# Patient Record
Sex: FEMALE | Race: WHITE | Employment: OTHER | URBAN - METROPOLITAN AREA
[De-identification: names, ages, dates, MRNs, and addresses within clinical notes are randomized per-mention and may not be internally consistent; named-entity substitution may affect disease eponyms.]

---

## 2019-05-20 ENCOUNTER — HOSPITAL ENCOUNTER (EMERGENCY)
Age: 79
Discharge: HOME | End: 2019-05-20
Attending: EMERGENCY MEDICINE
Payer: MEDICARE

## 2019-05-20 ENCOUNTER — HOSPITAL ENCOUNTER (EMERGENCY)
Age: 79
Discharge: STILL ADMITTED OR ADMINISTRATIVE | End: 2019-05-20
Attending: EMERGENCY MEDICINE

## 2019-05-20 ENCOUNTER — APPOINTMENT (OUTPATIENT)
Dept: GENERAL RADIOLOGY | Age: 79
End: 2019-05-20
Attending: EMERGENCY MEDICINE
Payer: MEDICARE

## 2019-05-20 VITALS
HEART RATE: 77 BPM | DIASTOLIC BLOOD PRESSURE: 72 MMHG | RESPIRATION RATE: 22 BRPM | SYSTOLIC BLOOD PRESSURE: 130 MMHG | OXYGEN SATURATION: 96 % | TEMPERATURE: 98.2 F

## 2019-05-20 VITALS
HEART RATE: 70 BPM | SYSTOLIC BLOOD PRESSURE: 130 MMHG | DIASTOLIC BLOOD PRESSURE: 74 MMHG | TEMPERATURE: 98.5 F | RESPIRATION RATE: 22 BRPM | OXYGEN SATURATION: 97 %

## 2019-05-20 DIAGNOSIS — N30.90 CYSTITIS: Primary | ICD-10-CM

## 2019-05-20 LAB
ALBUMIN SERPL-MCNC: 3.2 G/DL (ref 3.2–4.6)
ALBUMIN/GLOB SERPL: 1.1 {RATIO} (ref 1.2–3.5)
ALP SERPL-CCNC: 69 U/L (ref 50–130)
ALT SERPL-CCNC: 21 U/L (ref 12–65)
ANION GAP SERPL CALC-SCNC: 8 MMOL/L (ref 7–16)
APPEARANCE UR: ABNORMAL
AST SERPL-CCNC: 26 U/L (ref 15–37)
BACTERIA URNS QL MICRO: ABNORMAL /HPF
BASOPHILS # BLD: 0 K/UL (ref 0–0.2)
BASOPHILS NFR BLD: 0 % (ref 0–2)
BILIRUB SERPL-MCNC: 0.8 MG/DL (ref 0.2–1.1)
BILIRUB UR QL: ABNORMAL
BUN SERPL-MCNC: 22 MG/DL (ref 8–23)
CALCIUM SERPL-MCNC: 8.8 MG/DL (ref 8.3–10.4)
CASTS URNS QL MICRO: ABNORMAL /LPF
CHLORIDE SERPL-SCNC: 111 MMOL/L (ref 98–107)
CO2 SERPL-SCNC: 28 MMOL/L (ref 21–32)
COLOR UR: ABNORMAL
CREAT SERPL-MCNC: 0.72 MG/DL (ref 0.6–1)
DIFFERENTIAL METHOD BLD: ABNORMAL
EOSINOPHIL # BLD: 0 K/UL (ref 0–0.8)
EOSINOPHIL NFR BLD: 1 % (ref 0.5–7.8)
EPI CELLS #/AREA URNS HPF: ABNORMAL /HPF
ERYTHROCYTE [DISTWIDTH] IN BLOOD BY AUTOMATED COUNT: 12.2 % (ref 11.9–14.6)
GLOBULIN SER CALC-MCNC: 2.9 G/DL (ref 2.3–3.5)
GLUCOSE SERPL-MCNC: 79 MG/DL (ref 65–100)
GLUCOSE UR STRIP.AUTO-MCNC: NEGATIVE MG/DL
HCT VFR BLD AUTO: 39 % (ref 35.8–46.3)
HGB BLD-MCNC: 12.5 G/DL (ref 11.7–15.4)
HGB UR QL STRIP: NEGATIVE
IMM GRANULOCYTES # BLD AUTO: 0.1 K/UL (ref 0–0.5)
IMM GRANULOCYTES NFR BLD AUTO: 1 % (ref 0–5)
KETONES UR QL STRIP.AUTO: ABNORMAL MG/DL
LEUKOCYTE ESTERASE UR QL STRIP.AUTO: ABNORMAL
LYMPHOCYTES # BLD: 1 K/UL (ref 0.5–4.6)
LYMPHOCYTES NFR BLD: 14 % (ref 13–44)
MAGNESIUM SERPL-MCNC: 2.1 MG/DL (ref 1.8–2.4)
MCH RBC QN AUTO: 30.7 PG (ref 26.1–32.9)
MCHC RBC AUTO-ENTMCNC: 32.1 G/DL (ref 31.4–35)
MCV RBC AUTO: 95.8 FL (ref 79.6–97.8)
MONOCYTES # BLD: 0.7 K/UL (ref 0.1–1.3)
MONOCYTES NFR BLD: 11 % (ref 4–12)
MUCOUS THREADS URNS QL MICRO: ABNORMAL /LPF
NEUTS SEG # BLD: 4.9 K/UL (ref 1.7–8.2)
NEUTS SEG NFR BLD: 73 % (ref 43–78)
NITRITE UR QL STRIP.AUTO: POSITIVE
NRBC # BLD: 0 K/UL (ref 0–0.2)
PH UR STRIP: 5.5 [PH] (ref 5–9)
PLATELET # BLD AUTO: 139 K/UL (ref 150–450)
PMV BLD AUTO: 10.2 FL (ref 9.4–12.3)
POTASSIUM SERPL-SCNC: 3.9 MMOL/L (ref 3.5–5.1)
PROT SERPL-MCNC: 6.1 G/DL (ref 6.3–8.2)
PROT UR STRIP-MCNC: 30 MG/DL
RBC # BLD AUTO: 4.07 M/UL (ref 4.05–5.2)
RBC #/AREA URNS HPF: ABNORMAL /HPF
SODIUM SERPL-SCNC: 147 MMOL/L (ref 136–145)
SP GR UR REFRACTOMETRY: 1.06 (ref 1–1.02)
UROBILINOGEN UR QL STRIP.AUTO: 1 EU/DL (ref 0.2–1)
WBC # BLD AUTO: 6.7 K/UL (ref 4.3–11.1)
WBC URNS QL MICRO: ABNORMAL /HPF

## 2019-05-20 PROCEDURE — 99285 EMERGENCY DEPT VISIT HI MDM: CPT | Performed by: EMERGENCY MEDICINE

## 2019-05-20 PROCEDURE — 71045 X-RAY EXAM CHEST 1 VIEW: CPT

## 2019-05-20 PROCEDURE — 51702 INSERT TEMP BLADDER CATH: CPT | Performed by: EMERGENCY MEDICINE

## 2019-05-20 PROCEDURE — 77030011943: Performed by: EMERGENCY MEDICINE

## 2019-05-20 PROCEDURE — 75810000275 HC EMERGENCY DEPT VISIT NO LEVEL OF CARE: Performed by: EMERGENCY MEDICINE

## 2019-05-20 PROCEDURE — 87086 URINE CULTURE/COLONY COUNT: CPT

## 2019-05-20 PROCEDURE — 83735 ASSAY OF MAGNESIUM: CPT

## 2019-05-20 PROCEDURE — 81001 URINALYSIS AUTO W/SCOPE: CPT

## 2019-05-20 PROCEDURE — 80053 COMPREHEN METABOLIC PANEL: CPT

## 2019-05-20 PROCEDURE — 96365 THER/PROPH/DIAG IV INF INIT: CPT | Performed by: EMERGENCY MEDICINE

## 2019-05-20 PROCEDURE — 74011000258 HC RX REV CODE- 258: Performed by: EMERGENCY MEDICINE

## 2019-05-20 PROCEDURE — 85025 COMPLETE CBC W/AUTO DIFF WBC: CPT

## 2019-05-20 PROCEDURE — 74011250636 HC RX REV CODE- 250/636: Performed by: EMERGENCY MEDICINE

## 2019-05-20 PROCEDURE — 96361 HYDRATE IV INFUSION ADD-ON: CPT | Performed by: EMERGENCY MEDICINE

## 2019-05-20 RX ORDER — CEPHALEXIN 500 MG/1
500 CAPSULE ORAL 3 TIMES DAILY
Qty: 30 CAP | Refills: 0 | Status: SHIPPED | OUTPATIENT
Start: 2019-05-20 | End: 2019-05-30

## 2019-05-20 RX ORDER — MEMANTINE HYDROCHLORIDE 10 MG/1
TABLET ORAL DAILY
COMMUNITY

## 2019-05-20 RX ORDER — LISINOPRIL 20 MG/1
TABLET ORAL DAILY
COMMUNITY

## 2019-05-20 RX ORDER — MIRTAZAPINE 30 MG/1
30 TABLET, FILM COATED ORAL
COMMUNITY

## 2019-05-20 RX ORDER — QUETIAPINE FUMARATE 50 MG/1
50 TABLET, FILM COATED ORAL DAILY
COMMUNITY

## 2019-05-20 RX ORDER — DONEPEZIL HYDROCHLORIDE 10 MG/1
10 TABLET, FILM COATED ORAL
COMMUNITY

## 2019-05-20 RX ORDER — DIVALPROEX SODIUM 125 MG/1
125 CAPSULE, COATED PELLETS ORAL 2 TIMES DAILY
COMMUNITY

## 2019-05-20 RX ORDER — LANOLIN ALCOHOL/MO/W.PET/CERES
1000 CREAM (GRAM) TOPICAL DAILY
COMMUNITY
End: 2021-12-22

## 2019-05-20 RX ORDER — DONEPEZIL HYDROCHLORIDE 10 MG/1
10 TABLET, FILM COATED ORAL
COMMUNITY
End: 2021-12-22

## 2019-05-20 RX ORDER — LISINOPRIL 20 MG/1
TABLET ORAL DAILY
Status: ON HOLD | COMMUNITY
End: 2022-02-26 | Stop reason: SDUPTHER

## 2019-05-20 RX ORDER — MEMANTINE HYDROCHLORIDE 10 MG/1
10 TABLET ORAL 2 TIMES DAILY
COMMUNITY
End: 2021-12-22

## 2019-05-20 RX ORDER — QUETIAPINE FUMARATE 50 MG/1
50 TABLET, FILM COATED ORAL DAILY
COMMUNITY
End: 2022-02-26

## 2019-05-20 RX ORDER — LANOLIN ALCOHOL/MO/W.PET/CERES
1000 CREAM (GRAM) TOPICAL DAILY
COMMUNITY

## 2019-05-20 RX ORDER — MIRTAZAPINE 30 MG/1
TABLET, FILM COATED ORAL
COMMUNITY
End: 2021-12-22

## 2019-05-20 RX ORDER — DIVALPROEX SODIUM 125 MG/1
125 CAPSULE, COATED PELLETS ORAL 3 TIMES DAILY
COMMUNITY

## 2019-05-20 RX ADMIN — SODIUM CHLORIDE 1000 ML: 900 INJECTION, SOLUTION INTRAVENOUS at 14:09

## 2019-05-20 RX ADMIN — SODIUM CHLORIDE 1000 ML: 900 INJECTION, SOLUTION INTRAVENOUS at 16:09

## 2019-05-20 RX ADMIN — CEFTRIAXONE 1 G: 1 INJECTION, POWDER, FOR SOLUTION INTRAMUSCULAR; INTRAVENOUS at 15:51

## 2019-05-20 NOTE — DISCHARGE INSTRUCTIONS
Patient's urinalysis is consistent with infection. She was given antibiotics here in the emergency department. The remainder of her workup was unremarkable. Complete 10 days of Keflex as an outpatient.

## 2019-05-20 NOTE — ED PROVIDER NOTES
80-year-old female presenting for decreased mental status. Story was given by EMS via nursing facility. They reported that she was less interactive and somnolent this morning. Seen for same at another hospital yesterday. Patient is alert and interactive now. Denies any complaints at this time. She does have advanced dementia and is alert to self only. The history is provided by the EMS personnel and the nursing home. Lethargy This is a recurrent problem. The current episode started 3 to 5 hours ago. The problem has been resolved. Pertinent negatives include no chest pain, no abdominal pain, no headaches and no shortness of breath. Nothing aggravates the symptoms. Nothing relieves the symptoms. Past Medical History:  
Diagnosis Date  Hypertension  Psychiatric disorder History reviewed. No pertinent surgical history. History reviewed. No pertinent family history. Social History Socioeconomic History  Marital status: Not on file Spouse name: Not on file  Number of children: Not on file  Years of education: Not on file  Highest education level: Not on file Occupational History  Not on file Social Needs  Financial resource strain: Not on file  Food insecurity:  
  Worry: Not on file Inability: Not on file  Transportation needs:  
  Medical: Not on file Non-medical: Not on file Tobacco Use  Smoking status: Not on file Substance and Sexual Activity  Alcohol use: Not on file  Drug use: Not on file  Sexual activity: Not on file Lifestyle  Physical activity:  
  Days per week: Not on file Minutes per session: Not on file  Stress: Not on file Relationships  Social connections:  
  Talks on phone: Not on file Gets together: Not on file Attends Synagogue service: Not on file Active member of club or organization: Not on file Attends meetings of clubs or organizations: Not on file Relationship status: Not on file  Intimate partner violence:  
  Fear of current or ex partner: Not on file Emotionally abused: Not on file Physically abused: Not on file Forced sexual activity: Not on file Other Topics Concern  Not on file Social History Narrative  Not on file ALLERGIES: Patient has no known allergies. Review of Systems Constitutional: Positive for fatigue. Respiratory: Negative for shortness of breath. Cardiovascular: Negative for chest pain. Gastrointestinal: Negative for abdominal pain. Neurological: Negative for headaches. All other systems reviewed and are negative. Vitals:  
 05/20/19 1323 BP: 119/72 Pulse: 70 Resp: 22 Temp: 98.5 °F (36.9 °C) SpO2: 97% Physical Exam  
Constitutional: She appears well-developed and well-nourished. HENT:  
Head: Normocephalic and atraumatic. Eyes: Pupils are equal, round, and reactive to light. Conjunctivae are normal.  
Neck: Neck supple. Cardiovascular: Normal rate and regular rhythm. Pulmonary/Chest: Effort normal and breath sounds normal.  
Abdominal: Soft. Bowel sounds are normal.  
Musculoskeletal: Normal range of motion. Neurological: She is alert. Alert to self Skin: Skin is warm and dry. Abrasions on both knees and the left elbow Nursing note and vitals reviewed. MDM Number of Diagnoses or Management Options Cystitis:  
Diagnosis management comments: 70-year-old female presenting for decreased level of consciousness which seems to have resolved. Apparently the patient had a workup yesterday for the same. I reviewed care everywhere and could not find this. My suspicion is the patient has medications that make her too drowsy and are often still in her system in the mornings but we will check basic labs, urinalysis, chest x-ray. I reviewed her vital signs which are normal.  Her exam is reassuring. Amount and/or Complexity of Data Reviewed Clinical lab tests: ordered and reviewed Tests in the radiology section of CPT®: ordered and reviewed Tests in the medicine section of CPT®: ordered and reviewed Review and summarize past medical records: yes (Reviewed the patient's ER visit yesterday. It appears she was sent to the emergency department for abdominal pain and lethargy. She had normal labs and normal abdominal CT. No episodes of lethargy while she was in the ER that day as well just as today) Risk of Complications, Morbidity, and/or Mortality Presenting problems: moderate Diagnostic procedures: moderate Management options: moderate General comments: I personally reviewed the patient's vital signs, laboratory tests, and/or radiological findings. I discussed these findings with the patient and their significance. I answered all questions and gave the patient clear return precautions. The patient was discharged from the emergency department in stable condition Patient Progress Patient progress: improved ED Course as of May 20 1639 Mon May 20, 2019  
1352 Chest x-ray shows a widened mediastinum without rotation, no consolidation [JS] 1512 Patient's urinalysis shows 4+ bacteria is nitrite and leukocyte esterase positive. Also hemoconcentrated consistent with dehydration. Treating with ceftriaxone. [JS] 1514 I reviewed the patient's urinalysis from yesterday and today's findings are very different from the ears. This is difficult to explain. Regardless, she is getting antibiotics-year-old be discharged with the same. [JS] ED Course User Index [JS] Matt Bosworth, MD  
 
 
Procedures

## 2019-05-20 NOTE — ED TRIAGE NOTES
Patient presents via EMS from the [facility] at [facility] for \"lethargy\" according to staff at her home. Patient has a hx of alzheimers and is only oriented to self at baseline per EMS.  Patient was evaluated at [facility] yesterday for the same complaint per EMS

## 2019-05-20 NOTE — ED TRIAGE NOTES
Patient presents from the [facility] via EMS for staff reported \"lethargy and low grade fever over the weekend. \" Per EMS patient was evaluated for the same condition yestreday at [facility]. Patient has a hx of dementia and is confused at baseline.

## 2019-05-23 LAB
BACTERIA SPEC CULT: NORMAL
SERVICE CMNT-IMP: NORMAL

## 2020-12-15 ENCOUNTER — HOSPITAL ENCOUNTER (OUTPATIENT)
Dept: LAB | Age: 80
Discharge: HOME | End: 2020-12-15

## 2020-12-15 LAB
ANION GAP SERPL CALC-SCNC: 6 MMOL/L (ref 7–16)
BUN SERPL-MCNC: 18 MG/DL (ref 8–23)
CALCIUM SERPL-MCNC: 9.1 MG/DL (ref 8.3–10.4)
CHLORIDE SERPL-SCNC: 110 MMOL/L (ref 98–107)
CO2 SERPL-SCNC: 25 MMOL/L (ref 21–32)
CREAT SERPL-MCNC: 0.66 MG/DL (ref 0.6–1)
ERYTHROCYTE [DISTWIDTH] IN BLOOD BY AUTOMATED COUNT: 13.2 % (ref 11.9–14.6)
GLUCOSE SERPL-MCNC: 70 MG/DL (ref 65–100)
HCT VFR BLD AUTO: 35.6 % (ref 35.8–46.3)
HGB BLD-MCNC: 11 G/DL (ref 11.7–15.4)
MCH RBC QN AUTO: 31 PG (ref 26.1–32.9)
MCHC RBC AUTO-ENTMCNC: 30.9 G/DL (ref 31.4–35)
MCV RBC AUTO: 100.3 FL (ref 79.6–97.8)
NRBC # BLD: 0 K/UL (ref 0–0.2)
PLATELET # BLD AUTO: 237 K/UL (ref 150–450)
PMV BLD AUTO: 9.9 FL (ref 9.4–12.3)
POTASSIUM SERPL-SCNC: 4.4 MMOL/L (ref 3.5–5.1)
RBC # BLD AUTO: 3.55 M/UL (ref 4.05–5.2)
SODIUM SERPL-SCNC: 141 MMOL/L (ref 136–145)
WBC # BLD AUTO: 5.5 K/UL (ref 4.3–11.1)

## 2020-12-15 PROCEDURE — 80048 BASIC METABOLIC PNL TOTAL CA: CPT

## 2020-12-15 PROCEDURE — 85027 COMPLETE CBC AUTOMATED: CPT

## 2021-12-15 ENCOUNTER — APPOINTMENT (OUTPATIENT)
Dept: GENERAL RADIOLOGY | Age: 81
DRG: 481 | End: 2021-12-15
Attending: EMERGENCY MEDICINE
Payer: MEDICARE

## 2021-12-15 ENCOUNTER — HOSPITAL ENCOUNTER (INPATIENT)
Age: 81
LOS: 7 days | Discharge: NURSING, REHAB OR LONG-TERM CARE | DRG: 481 | End: 2021-12-22
Attending: EMERGENCY MEDICINE | Admitting: FAMILY MEDICINE
Payer: MEDICARE

## 2021-12-15 ENCOUNTER — APPOINTMENT (OUTPATIENT)
Dept: CT IMAGING | Age: 81
DRG: 481 | End: 2021-12-15
Attending: EMERGENCY MEDICINE
Payer: MEDICARE

## 2021-12-15 DIAGNOSIS — S72.001A CLOSED FRACTURE OF RIGHT HIP, INITIAL ENCOUNTER (HCC): Primary | ICD-10-CM

## 2021-12-15 LAB
ALBUMIN SERPL-MCNC: 3.8 G/DL (ref 3.2–4.6)
ALBUMIN/GLOB SERPL: 1.2 {RATIO} (ref 1.2–3.5)
ALP SERPL-CCNC: 87 U/L (ref 50–136)
ALT SERPL-CCNC: 19 U/L (ref 12–65)
ANION GAP SERPL CALC-SCNC: 12 MMOL/L (ref 7–16)
AST SERPL-CCNC: 16 U/L (ref 15–37)
BASOPHILS # BLD: 0 K/UL (ref 0–0.2)
BASOPHILS NFR BLD: 0 % (ref 0–2)
BILIRUB DIRECT SERPL-MCNC: 0.3 MG/DL
BILIRUB SERPL-MCNC: 1 MG/DL (ref 0.2–1.1)
BUN SERPL-MCNC: 25 MG/DL (ref 8–23)
CALCIUM SERPL-MCNC: 9.6 MG/DL (ref 8.3–10.4)
CHLORIDE SERPL-SCNC: 107 MMOL/L (ref 98–107)
CO2 SERPL-SCNC: 26 MMOL/L (ref 21–32)
CREAT SERPL-MCNC: 0.7 MG/DL (ref 0.6–1)
DIFFERENTIAL METHOD BLD: ABNORMAL
EOSINOPHIL # BLD: 0.1 K/UL (ref 0–0.8)
EOSINOPHIL NFR BLD: 1 % (ref 0.5–7.8)
ERYTHROCYTE [DISTWIDTH] IN BLOOD BY AUTOMATED COUNT: 12.8 % (ref 11.9–14.6)
GLOBULIN SER CALC-MCNC: 3.1 G/DL (ref 2.3–3.5)
GLUCOSE SERPL-MCNC: 95 MG/DL (ref 65–100)
HCT VFR BLD AUTO: 40.5 % (ref 35.8–46.3)
HGB BLD-MCNC: 13.3 G/DL (ref 11.7–15.4)
IMM GRANULOCYTES # BLD AUTO: 0.2 K/UL (ref 0–0.5)
IMM GRANULOCYTES NFR BLD AUTO: 1 % (ref 0–5)
LYMPHOCYTES # BLD: 0.6 K/UL (ref 0.5–4.6)
LYMPHOCYTES NFR BLD: 5 % (ref 13–44)
MAGNESIUM SERPL-MCNC: 2.1 MG/DL (ref 1.8–2.4)
MCH RBC QN AUTO: 30.5 PG (ref 26.1–32.9)
MCHC RBC AUTO-ENTMCNC: 32.8 G/DL (ref 31.4–35)
MCV RBC AUTO: 92.9 FL (ref 79.6–97.8)
MONOCYTES # BLD: 0.6 K/UL (ref 0.1–1.3)
MONOCYTES NFR BLD: 6 % (ref 4–12)
NEUTS SEG # BLD: 9.1 K/UL (ref 1.7–8.2)
NEUTS SEG NFR BLD: 86 % (ref 43–78)
NRBC # BLD: 0 K/UL (ref 0–0.2)
PLATELET # BLD AUTO: 193 K/UL (ref 150–450)
PMV BLD AUTO: 10.2 FL (ref 9.4–12.3)
POTASSIUM SERPL-SCNC: 3 MMOL/L (ref 3.5–5.1)
PROT SERPL-MCNC: 6.9 G/DL (ref 6.3–8.2)
RBC # BLD AUTO: 4.36 M/UL (ref 4.05–5.2)
SODIUM SERPL-SCNC: 145 MMOL/L (ref 136–145)
WBC # BLD AUTO: 10.6 K/UL (ref 4.3–11.1)

## 2021-12-15 PROCEDURE — 74011250636 HC RX REV CODE- 250/636: Performed by: FAMILY MEDICINE

## 2021-12-15 PROCEDURE — 80048 BASIC METABOLIC PNL TOTAL CA: CPT

## 2021-12-15 PROCEDURE — 96374 THER/PROPH/DIAG INJ IV PUSH: CPT

## 2021-12-15 PROCEDURE — 85025 COMPLETE CBC W/AUTO DIFF WBC: CPT

## 2021-12-15 PROCEDURE — 99284 EMERGENCY DEPT VISIT MOD MDM: CPT

## 2021-12-15 PROCEDURE — 70450 CT HEAD/BRAIN W/O DYE: CPT

## 2021-12-15 PROCEDURE — 73552 X-RAY EXAM OF FEMUR 2/>: CPT

## 2021-12-15 PROCEDURE — 73502 X-RAY EXAM HIP UNI 2-3 VIEWS: CPT

## 2021-12-15 PROCEDURE — 71045 X-RAY EXAM CHEST 1 VIEW: CPT

## 2021-12-15 PROCEDURE — 65270000029 HC RM PRIVATE

## 2021-12-15 PROCEDURE — 80076 HEPATIC FUNCTION PANEL: CPT

## 2021-12-15 PROCEDURE — 72125 CT NECK SPINE W/O DYE: CPT

## 2021-12-15 PROCEDURE — 83735 ASSAY OF MAGNESIUM: CPT

## 2021-12-15 RX ORDER — ENOXAPARIN SODIUM 100 MG/ML
40 INJECTION SUBCUTANEOUS EVERY 24 HOURS
Status: DISCONTINUED | OUTPATIENT
Start: 2021-12-16 | End: 2021-12-18

## 2021-12-15 RX ORDER — MORPHINE SULFATE 2 MG/ML
1 INJECTION, SOLUTION INTRAMUSCULAR; INTRAVENOUS
Status: DISCONTINUED | OUTPATIENT
Start: 2021-12-15 | End: 2021-12-15

## 2021-12-15 RX ORDER — DONEPEZIL HYDROCHLORIDE 5 MG/1
10 TABLET, FILM COATED ORAL
Status: DISCONTINUED | OUTPATIENT
Start: 2021-12-15 | End: 2021-12-19

## 2021-12-15 RX ORDER — POTASSIUM CHLORIDE 14.9 MG/ML
20 INJECTION INTRAVENOUS
Status: DISPENSED | OUTPATIENT
Start: 2021-12-15 | End: 2021-12-15

## 2021-12-15 RX ORDER — MIRTAZAPINE 15 MG/1
30 TABLET, FILM COATED ORAL
Status: CANCELLED | OUTPATIENT
Start: 2021-12-15

## 2021-12-15 RX ORDER — MEMANTINE HYDROCHLORIDE 5 MG/1
10 TABLET ORAL 2 TIMES DAILY
Status: DISCONTINUED | OUTPATIENT
Start: 2021-12-16 | End: 2021-12-19

## 2021-12-15 RX ORDER — MORPHINE SULFATE 2 MG/ML
0.5 INJECTION, SOLUTION INTRAMUSCULAR; INTRAVENOUS
Status: DISCONTINUED | OUTPATIENT
Start: 2021-12-15 | End: 2021-12-17

## 2021-12-15 RX ORDER — MORPHINE SULFATE 2 MG/ML
0.5 INJECTION, SOLUTION INTRAMUSCULAR; INTRAVENOUS
Status: DISCONTINUED | OUTPATIENT
Start: 2021-12-15 | End: 2021-12-15

## 2021-12-15 RX ORDER — DIVALPROEX SODIUM 125 MG/1
125 CAPSULE, COATED PELLETS ORAL 2 TIMES DAILY
Status: DISCONTINUED | OUTPATIENT
Start: 2021-12-16 | End: 2021-12-22 | Stop reason: HOSPADM

## 2021-12-15 RX ORDER — LISINOPRIL 20 MG/1
20 TABLET ORAL DAILY
Status: DISCONTINUED | OUTPATIENT
Start: 2021-12-16 | End: 2021-12-16

## 2021-12-15 RX ORDER — LANOLIN ALCOHOL/MO/W.PET/CERES
1000 CREAM (GRAM) TOPICAL DAILY
Status: DISCONTINUED | OUTPATIENT
Start: 2021-12-16 | End: 2021-12-19

## 2021-12-15 RX ORDER — QUETIAPINE FUMARATE 25 MG/1
50 TABLET, FILM COATED ORAL DAILY
Status: CANCELLED | OUTPATIENT
Start: 2021-12-16

## 2021-12-15 RX ADMIN — POTASSIUM CHLORIDE 20 MEQ: 14.9 INJECTION, SOLUTION INTRAVENOUS at 19:43

## 2021-12-15 NOTE — ED TRIAGE NOTES
Patient arrives via [facility] from [facility], fall hitting head no LOC denies neck and back pain about 45 mins ago. Pain to right leg when manipulated.

## 2021-12-15 NOTE — ED PROVIDER NOTES
80-year-old white female history of dementia and residing in memory unit of a skilled nursing facility sent to hospital due to a fall with suspected hip injury. Per EMS patient fell backward and struck her head but had no reported loss of consciousness. Staff at facility noted pain with movement of her right leg. Patient has fairly severe dementia and cannot contribute history of present illness. The history is provided by the EMS personnel. Fall         Past Medical History:   Diagnosis Date    Hypertension     Psychiatric disorder        No past surgical history on file. No family history on file. Social History     Socioeconomic History    Marital status: Not on file     Spouse name: Not on file    Number of children: Not on file    Years of education: Not on file    Highest education level: Not on file   Occupational History    Not on file   Tobacco Use    Smoking status: Not on file    Smokeless tobacco: Not on file   Substance and Sexual Activity    Alcohol use: Not Currently    Drug use: Not on file    Sexual activity: Not on file   Other Topics Concern    Not on file   Social History Narrative    Not on file     Social Determinants of Health     Financial Resource Strain:     Difficulty of Paying Living Expenses: Not on file   Food Insecurity:     Worried About Running Out of Food in the Last Year: Not on file    Loly of Food in the Last Year: Not on file   Transportation Needs:     Lack of Transportation (Medical): Not on file    Lack of Transportation (Non-Medical):  Not on file   Physical Activity:     Days of Exercise per Week: Not on file    Minutes of Exercise per Session: Not on file   Stress:     Feeling of Stress : Not on file   Social Connections:     Frequency of Communication with Friends and Family: Not on file    Frequency of Social Gatherings with Friends and Family: Not on file    Attends [employer] Services: Not on file   [employer] of Clubs or Organizations: Not on file    Attends Club or Organization Meetings: Not on file    Marital Status: Not on file   Intimate Partner Violence:     Fear of Current or Ex-Partner: Not on file    Emotionally Abused: Not on file    Physically Abused: Not on file    Sexually Abused: Not on file   Housing Stability:     Unable to Pay for Housing in the Last Year: Not on file    Number of [place] in the Last Year: Not on file    Unstable Housing in the Last Year: Not on file         ALLERGIES: Patient has no known allergies. Review of Systems   Unable to perform ROS: Dementia       Vitals:    12/15/21 1558   BP: (!) 158/116   Pulse: 85   Resp: 22   Temp: 98.4 °F (36.9 °C)   SpO2: 95%            Physical Exam  Vitals and nursing note reviewed. Constitutional:       General: She is not in acute distress. Appearance: Normal appearance. She is not ill-appearing or toxic-appearing. HENT:      Head: Normocephalic and atraumatic. Nose: Nose normal.      Mouth/Throat:      Mouth: Mucous membranes are moist.   Eyes:      Conjunctiva/sclera: Conjunctivae normal.      Pupils: Pupils are equal, round, and reactive to light. Cardiovascular:      Rate and Rhythm: Normal rate and regular rhythm. Pulmonary:      Effort: Pulmonary effort is normal.      Breath sounds: Normal breath sounds. Abdominal:      General: There is no distension. Palpations: Abdomen is soft. Tenderness: There is no abdominal tenderness. Musculoskeletal:      Cervical back: Normal range of motion and neck supple. No tenderness. Comments: Pain with movement to right hip. Skin:     General: Skin is warm and dry. Neurological:      Mental Status: She is alert. Mental status is at baseline. Psychiatric:         Mood and Affect: Mood normal.         Behavior: Behavior normal.          MDM  Number of Diagnoses or Management Options  Diagnosis management comments: X-ray confirms right hip fracture.   CT head and C-spine no acute abnormality.        Amount and/or Complexity of Data Reviewed  Clinical lab tests: ordered and reviewed  Tests in the radiology section of CPT®: ordered and reviewed  Discuss the patient with other providers: yes  Independent visualization of images, tracings, or specimens: yes    Risk of Complications, Morbidity, and/or Mortality  Presenting problems: moderate  Diagnostic procedures: moderate  Management options: moderate           Procedures

## 2021-12-16 ENCOUNTER — ANESTHESIA EVENT (OUTPATIENT)
Dept: SURGERY | Age: 81
DRG: 481 | End: 2021-12-16
Payer: MEDICARE

## 2021-12-16 LAB
ANION GAP SERPL CALC-SCNC: 10 MMOL/L (ref 7–16)
APPEARANCE UR: ABNORMAL
APTT PPP: 33.6 SEC (ref 24.1–35.1)
BACTERIA SPEC CULT: NORMAL
BACTERIA URNS QL MICRO: ABNORMAL /HPF
BILIRUB UR QL: ABNORMAL
BUN SERPL-MCNC: 21 MG/DL (ref 8–23)
CALCIUM SERPL-MCNC: 9.1 MG/DL (ref 8.3–10.4)
CASTS URNS QL MICRO: ABNORMAL /LPF
CHLORIDE SERPL-SCNC: 114 MMOL/L (ref 98–107)
CO2 SERPL-SCNC: 23 MMOL/L (ref 21–32)
COLOR UR: ABNORMAL
COVID-19 RAPID TEST, COVR: NOT DETECTED
CREAT SERPL-MCNC: 0.51 MG/DL (ref 0.6–1)
GLUCOSE SERPL-MCNC: 97 MG/DL (ref 65–100)
GLUCOSE UR STRIP.AUTO-MCNC: NEGATIVE MG/DL
HGB UR QL STRIP: ABNORMAL
INR PPP: 1.2
KETONES UR QL STRIP.AUTO: >80 MG/DL
LEUKOCYTE ESTERASE UR QL STRIP.AUTO: ABNORMAL
MUCOUS THREADS URNS QL MICRO: ABNORMAL /LPF
NITRITE UR QL STRIP.AUTO: NEGATIVE
OTHER OBSERVATIONS,UCOM: ABNORMAL
PH UR STRIP: 6 [PH] (ref 5–9)
POTASSIUM SERPL-SCNC: 3.5 MMOL/L (ref 3.5–5.1)
PROT UR STRIP-MCNC: 30 MG/DL
PROTHROMBIN TIME: 15 SEC (ref 12.6–14.5)
RBC #/AREA URNS HPF: ABNORMAL /HPF
SERVICE CMNT-IMP: NORMAL
SODIUM SERPL-SCNC: 147 MMOL/L (ref 136–145)
SOURCE, COVRS: NORMAL
SP GR UR REFRACTOMETRY: 1.03 (ref 1–1.02)
UROBILINOGEN UR QL STRIP.AUTO: 1 EU/DL (ref 0.2–1)
WBC URNS QL MICRO: ABNORMAL /HPF

## 2021-12-16 PROCEDURE — 86580 TB INTRADERMAL TEST: CPT | Performed by: FAMILY MEDICINE

## 2021-12-16 PROCEDURE — 80048 BASIC METABOLIC PNL TOTAL CA: CPT

## 2021-12-16 PROCEDURE — 74011250637 HC RX REV CODE- 250/637: Performed by: FAMILY MEDICINE

## 2021-12-16 PROCEDURE — 85730 THROMBOPLASTIN TIME PARTIAL: CPT

## 2021-12-16 PROCEDURE — 81001 URINALYSIS AUTO W/SCOPE: CPT

## 2021-12-16 PROCEDURE — 74011250636 HC RX REV CODE- 250/636: Performed by: FAMILY MEDICINE

## 2021-12-16 PROCEDURE — 36415 COLL VENOUS BLD VENIPUNCTURE: CPT

## 2021-12-16 PROCEDURE — 86923 COMPATIBILITY TEST ELECTRIC: CPT

## 2021-12-16 PROCEDURE — 86901 BLOOD TYPING SEROLOGIC RH(D): CPT

## 2021-12-16 PROCEDURE — 74011000250 HC RX REV CODE- 250: Performed by: FAMILY MEDICINE

## 2021-12-16 PROCEDURE — 87086 URINE CULTURE/COLONY COUNT: CPT

## 2021-12-16 PROCEDURE — 51702 INSERT TEMP BLADDER CATH: CPT

## 2021-12-16 PROCEDURE — 85610 PROTHROMBIN TIME: CPT

## 2021-12-16 PROCEDURE — 2709999900 HC NON-CHARGEABLE SUPPLY

## 2021-12-16 PROCEDURE — 65270000029 HC RM PRIVATE

## 2021-12-16 PROCEDURE — 74011000258 HC RX REV CODE- 258: Performed by: PHYSICIAN ASSISTANT

## 2021-12-16 PROCEDURE — 94761 N-INVAS EAR/PLS OXIMETRY MLT: CPT

## 2021-12-16 PROCEDURE — 87641 MR-STAPH DNA AMP PROBE: CPT

## 2021-12-16 PROCEDURE — 74011250636 HC RX REV CODE- 250/636: Performed by: ORTHOPAEDIC SURGERY

## 2021-12-16 PROCEDURE — 74011250636 HC RX REV CODE- 250/636: Performed by: PHYSICIAN ASSISTANT

## 2021-12-16 PROCEDURE — 87635 SARS-COV-2 COVID-19 AMP PRB: CPT

## 2021-12-16 RX ORDER — POTASSIUM CHLORIDE 14.9 MG/ML
20 INJECTION INTRAVENOUS
Status: COMPLETED | OUTPATIENT
Start: 2021-12-16 | End: 2021-12-16

## 2021-12-16 RX ORDER — LISINOPRIL 20 MG/1
40 TABLET ORAL DAILY
Status: DISCONTINUED | OUTPATIENT
Start: 2021-12-17 | End: 2021-12-22 | Stop reason: HOSPADM

## 2021-12-16 RX ORDER — SODIUM CHLORIDE 0.9 % (FLUSH) 0.9 %
5-40 SYRINGE (ML) INJECTION AS NEEDED
Status: DISCONTINUED | OUTPATIENT
Start: 2021-12-16 | End: 2021-12-19

## 2021-12-16 RX ORDER — OXYCODONE HYDROCHLORIDE 5 MG/1
5 TABLET ORAL
Status: DISCONTINUED | OUTPATIENT
Start: 2021-12-16 | End: 2021-12-22 | Stop reason: HOSPADM

## 2021-12-16 RX ORDER — CEFAZOLIN SODIUM/WATER 2 G/20 ML
2 SYRINGE (ML) INTRAVENOUS
Status: ACTIVE | OUTPATIENT
Start: 2021-12-16 | End: 2021-12-17

## 2021-12-16 RX ORDER — ACETAMINOPHEN 325 MG/1
650 TABLET ORAL EVERY 8 HOURS
Status: DISCONTINUED | OUTPATIENT
Start: 2021-12-16 | End: 2021-12-19

## 2021-12-16 RX ORDER — CEFAZOLIN SODIUM/WATER 2 G/20 ML
2 SYRINGE (ML) INTRAVENOUS ONCE
Status: DISCONTINUED | OUTPATIENT
Start: 2021-12-16 | End: 2021-12-16 | Stop reason: SDUPTHER

## 2021-12-16 RX ORDER — SODIUM CHLORIDE 0.9 % (FLUSH) 0.9 %
5-40 SYRINGE (ML) INJECTION EVERY 8 HOURS
Status: DISCONTINUED | OUTPATIENT
Start: 2021-12-16 | End: 2021-12-22 | Stop reason: HOSPADM

## 2021-12-16 RX ADMIN — SODIUM CHLORIDE 250 ML: 900 INJECTION, SOLUTION INTRAVENOUS at 04:38

## 2021-12-16 RX ADMIN — Medication 10 ML: at 14:38

## 2021-12-16 RX ADMIN — DIVALPROEX SODIUM 125 MG: 125 CAPSULE ORAL at 16:56

## 2021-12-16 RX ADMIN — MORPHINE SULFATE 0.5 MG: 2 INJECTION, SOLUTION INTRAMUSCULAR; INTRAVENOUS at 14:37

## 2021-12-16 RX ADMIN — Medication 10 ML: at 23:20

## 2021-12-16 RX ADMIN — POTASSIUM CHLORIDE 20 MEQ: 14.9 INJECTION, SOLUTION INTRAVENOUS at 11:14

## 2021-12-16 RX ADMIN — MEMANTINE HYDROCHLORIDE 10 MG: 5 TABLET ORAL at 16:54

## 2021-12-16 RX ADMIN — CEFTRIAXONE 1 G: 1 INJECTION, POWDER, FOR SOLUTION INTRAMUSCULAR; INTRAVENOUS at 14:00

## 2021-12-16 RX ADMIN — POTASSIUM CHLORIDE 20 MEQ: 14.9 INJECTION, SOLUTION INTRAVENOUS at 09:29

## 2021-12-16 RX ADMIN — ACETAMINOPHEN 650 MG: 325 TABLET ORAL at 16:54

## 2021-12-16 RX ADMIN — TUBERCULIN PURIFIED PROTEIN DERIVATIVE 5 UNITS: 5 INJECTION, SOLUTION INTRADERMAL at 09:33

## 2021-12-16 NOTE — PROGRESS NOTES
Problem: Falls - Risk of  Goal: *Absence of Falls  Description: Document Stephie Olea Fall Risk and appropriate interventions in the flowsheet.   Outcome: Progressing Towards Goal  Note: Fall Risk Interventions:       Mentation Interventions: Bed/chair exit alarm,Door open when patient unattended,Room close to nurse's station    Medication Interventions: Bed/chair exit alarm,Patient to call before getting OOB,Teach patient to arise slowly    Elimination Interventions: Bed/chair exit alarm,Call light in reach,Patient to call for help with toileting needs,Toileting schedule/hourly rounds    History of Falls Interventions: Bed/chair exit alarm,Consult care management for discharge planning,Door open when patient unattended         Problem: Patient Education: Go to Patient Education Activity  Goal: Patient/Family Education  Outcome: Progressing Towards Goal     Problem: Patient Education: Go to Patient Education Activity  Goal: Patient/Family Education  Outcome: Progressing Towards Goal     Problem: Hip Fracture:Day of Admission Pre-op  Goal: Off Pathway (Use only if patient is Off Pathway)  Outcome: Progressing Towards Goal  Goal: Activity/Safety  Outcome: Progressing Towards Goal  Goal: Consults, if ordered  Outcome: Progressing Towards Goal  Goal: Diagnostic Test/Procedures  Outcome: Progressing Towards Goal  Goal: Nutrition/Diet  Outcome: Progressing Towards Goal  Goal: Medications  Outcome: Progressing Towards Goal  Goal: Respiratory  Outcome: Progressing Towards Goal  Goal: Treatments/Interventions/Procedures  Outcome: Progressing Towards Goal  Goal: Psychosocial  Outcome: Progressing Towards Goal  Goal: *Labs/Tests Within Defined Limits in Preparation for Surgery  Outcome: Progressing Towards Goal  Goal: *Optimal pain control at patient's stated goal  Outcome: Progressing Towards Goal  Goal: *Hemodynamically stable  Outcome: Progressing Towards Goal     Problem: Hip Fracture: Day of Surgery Post-op Care  Goal: Off Pathway (Use only if patient is Off Pathway)  Outcome: Progressing Towards Goal  Goal: Activity/Safety  Outcome: Progressing Towards Goal  Goal: Consults, if ordered  Outcome: Progressing Towards Goal  Goal: Diagnostic Test/Procedures  Outcome: Progressing Towards Goal  Goal: Nutrition/Diet  Outcome: Progressing Towards Goal  Goal: Medications  Outcome: Progressing Towards Goal  Goal: Respiratory  Outcome: Progressing Towards Goal  Goal: Treatments/Interventions/Procedures  Outcome: Progressing Towards Goal  Goal: Psychosocial  Outcome: Progressing Towards Goal  Goal: *Absence of skin breakdown  Outcome: Progressing Towards Goal  Goal: *Optimal pain control at patient's stated goal  Outcome: Progressing Towards Goal  Goal: *Hemodynamically stable  Outcome: Progressing Towards Goal     Problem: Hip Fracture: Post-Op Day 1  Goal: Off Pathway (Use only if patient is Off Pathway)  Outcome: Progressing Towards Goal  Goal: Activity/Safety  Outcome: Progressing Towards Goal  Goal: Diagnostic Test/Procedures  Outcome: Progressing Towards Goal  Goal: Nutrition/Diet  Outcome: Progressing Towards Goal  Goal: Medications  Outcome: Progressing Towards Goal  Goal: Respiratory  Outcome: Progressing Towards Goal  Goal: Treatments/Interventions/Procedures  Outcome: Progressing Towards Goal  Goal: Psychosocial  Outcome: Progressing Towards Goal  Goal: Discharge Planning  Outcome: Progressing Towards Goal  Goal: *Demonstrates progressive activity  Outcome: Progressing Towards Goal  Goal: *Optimal pain control at patient's stated goal  Outcome: Progressing Towards Goal  Goal: *Hemodynamically stable  Outcome: Progressing Towards Goal  Goal: *Discharge plan identified  Outcome: Progressing Towards Goal  Goal: *Absence of skin breakdown  Outcome: Progressing Towards Goal     Problem: Hip Fracture: Post-Op Day 2  Goal: Off Pathway (Use only if patient is Off Pathway)  Outcome: Progressing Towards Goal  Goal: Activity/Safety  Outcome: Progressing Towards Goal  Goal: Diagnostic Test/Procedures  Outcome: Progressing Towards Goal  Goal: Nutrition/Diet  Outcome: Progressing Towards Goal  Goal: Medications  Outcome: Progressing Towards Goal  Goal: Respiratory  Outcome: Progressing Towards Goal  Goal: Treatments/Interventions/Procedures  Outcome: Progressing Towards Goal  Goal: Psychosocial  Outcome: Progressing Towards Goal  Goal: Discharge Planning  Outcome: Progressing Towards Goal  Goal: *Optimal pain control at patient's stated goal  Outcome: Progressing Towards Goal  Goal: *Hemodynamically stable  Outcome: Progressing Towards Goal  Goal: *Adequate oxygenation  Outcome: Progressing Towards Goal  Goal: *Tolerates increased activity  Outcome: Progressing Towards Goal  Goal: *Tolerates nutrition therapy  Outcome: Progressing Towards Goal  Goal: *Absence of skin breakdown  Outcome: Progressing Towards Goal     Problem: Hip Fracture: Post-Op Day 3  Goal: Off Pathway (Use only if patient is Off Pathway)  Outcome: Progressing Towards Goal  Goal: Activity/Safety  Outcome: Progressing Towards Goal  Goal: Diagnostic Test/Procedures  Outcome: Progressing Towards Goal  Goal: Nutrition/Diet  Outcome: Progressing Towards Goal  Goal: Medications  Outcome: Progressing Towards Goal  Goal: Respiratory  Outcome: Progressing Towards Goal  Goal: Treatments/Interventions/Procedures  Outcome: Progressing Towards Goal  Goal: Psychosocial  Outcome: Progressing Towards Goal  Goal: Discharge Planning  Outcome: Progressing Towards Goal  Goal: *Met physical therapy criteria for discharge to next level of care  Outcome: Progressing Towards Goal  Goal: *Optimal pain control at patient's stated goal  Outcome: Progressing Towards Goal  Goal: *Hemodynamically stable  Outcome: Progressing Towards Goal  Goal: *Tolerating diet  Outcome: Progressing Towards Goal  Goal: *Active bowel function  Outcome: Progressing Towards Goal  Goal: *Adequate urinary output  Outcome: Progressing Towards Goal  Goal: *Absence of skin breakdown  Outcome: Progressing Towards Goal  Goal: *Patient verbalizes understanding of discharge instructions  Outcome: Progressing Towards Goal     Problem: Hip Fracture: Post-Op Day 4  Goal: Off Pathway (Use only if patient is Off Pathway)  Outcome: Progressing Towards Goal  Goal: Activity/Safety  Outcome: Progressing Towards Goal  Goal: Diagnostic Test/Procedures  Outcome: Progressing Towards Goal  Goal: Nutrition/Diet  Outcome: Progressing Towards Goal  Goal: Medications  Outcome: Progressing Towards Goal  Goal: Respiratory  Outcome: Progressing Towards Goal  Goal: Treatments/Interventions/Procedures  Outcome: Progressing Towards Goal  Goal: Psychosocial  Outcome: Progressing Towards Goal  Goal: Discharge Planning  Outcome: Progressing Towards Goal  Goal: *Met physical therapy criteria for discharge to next level of care  Outcome: Progressing Towards Goal  Goal: *Optimal pain control at patient's stated goal  Outcome: Progressing Towards Goal  Goal: *Hemodynamically stable  Outcome: Progressing Towards Goal  Goal: *Tolerating diet  Outcome: Progressing Towards Goal  Goal: *Active bowel function  Outcome: Progressing Towards Goal  Goal: *Adequate urinary output  Outcome: Progressing Towards Goal  Goal: *Absence of skin breakdown  Outcome: Progressing Towards Goal  Goal: *Patient verbalizes understanding of discharge instructions  Outcome: Progressing Towards Goal

## 2021-12-16 NOTE — ACP (ADVANCE CARE PLANNING)
[facility] Hospital[facility]  At the heart of better care     Advance Care Planning   Admit Date:  12/15/2021  3:57 PM   Name:  Antoine Martinez   Age:  80 y.o. Sex:  female  :  1940   MRN:  [id number]   Room:  [place] [id number]    Antoine Martinez is able to make her own decisions: No    If pt unable to make decisions, POA/surrogate decision maker:  Pillo Troncoso  Son Jhon       Patient / surrogate decision-maker directed:  Code Status: LIMITED CODE -Resuscitate but DNI. Okay with shock, ACLS meds. Patient or surrogate consented to discussion of the current conditions, workup, management plans, prognosis, and understand the risk for further deterioration. Time spent: 20 minutes in direct discussion (face to face and/or over phone). Signed:   Tyrell Gao DO

## 2021-12-16 NOTE — PROGRESS NOTES
Hospitalist Progress Note   Admit Date:  12/15/2021  3:57 PM   Name:  Cally Owen   Age:  80 y.o. Sex:  female  :  1940   MRN:  [id number]   Room:  [facility]    Presenting Complaint: Fall    Reason(s) for Admission: Closed right hip fracture [facility]) [facility] Course & Interval History:   Cally Owen is a 80 y.o. female with medical history of severe dementia who presented from [place] [id number] in memory unit after SNF s/p fall hitting her head associated with R leg pain. Unclear what happened but per EMS, patient fell backward and struck her head but did not have LOC. At bedside, patient was pleasantly demented, alert but not oriented to person, place or time. Stated that she was \"doing okay. \"    In ED, VSS. CT head and CT C-spine were unremarkable. CXR unremarkable. Hip x-ray shows comminuted intertrochanteric fracture of the proximal right femur. Labs on admission only notable for K3.0. Pt was admitted for further medical mgmt with ortho consult requested. Subjective (21):  \"I'm fine. \"  Severely demented 81y.o. WF laying in bed without complaints    Unable to ROS with patient due to severe dementia    Assessment & Plan:     Principal Problem:    Closed right hip fracture ([address] (12/15/2021)  - appreciate ortho eval  - planned OR possibly today    Active Problems:    Hypokalemia  - IV supplementation  - repeat K+ pending      Uncontrolled HTN  - cont lisinopril (home dose resumed)      Severe dementia  - cont apricept / namenda      Debility  - HIGH RISK FOR RECURRENT FALLS  - PT/OT pending surgery        Dispo/Discharge Planning:    - anticipate at least 2 midnight hospitalization    Diet:  DIET NPO  DVT PPx: per ortho post op  Code status: Partial Code    Hospital Problems as of 2021 Never Reviewed          Codes Class Noted - Resolved POA    * (Principal) Closed right hip fracture ([address] ICD-10-CM: S72.001A  ICD-9-CM: 820.8  12/15/2021 - Present Yes Psychiatric disorder ICD-10-CM: F99  ICD-9-CM: 298.9  Unknown - Present Unknown              Objective:     Patient Vitals for the past 24 hrs:   Temp Pulse Resp BP SpO2   12/16/21 1136 99.2 °F (37.3 °C) 71 -- (!) 156/92 97 %   12/16/21 0802 98.2 °F (36.8 °C) 80 19 (!) 174/96 97 %   12/16/21 0636 98.3 °F (36.8 °C) 67 21 (!) 170/81 97 %   12/16/21 0502 -- -- -- -- 96 %   12/16/21 0430 -- -- -- (!) 145/87 --   12/16/21 0400 -- -- -- (!) 152/85 --   12/16/21 0330 -- -- -- (!) 156/88 --   12/16/21 0300 98.9 °F (37.2 °C) 78 -- (!) 160/96 96 %   12/16/21 0200 -- -- -- (!) 145/88 98 %   12/15/21 1800 -- -- -- (!) 149/86 --   12/15/21 1741 -- -- -- -- 97 %   12/15/21 1730 -- -- -- 139/84 95 %   12/15/21 1558 98.4 °F (36.9 °C) 85 22 (!) 158/116 95 %     Oxygen Therapy  O2 Sat (%): 97 % (12/16/21 1136)  Pulse via Oximetry: 76 beats per minute (12/16/21 0200)  O2 Device: None (Room air) (12/16/21 0502)    There is no height or weight on file to calculate BMI. No intake or output data in the 24 hours ending 12/16/21 1224      Physical Exam:     Blood pressure (!) 156/92, pulse 71, temperature 99.2 °F (37.3 °C), resp. rate 19, SpO2 97 %. General:    Poorly kept, well developed. No overt distress  Head:  Normocephalic, atraumatic  Eyes:  Sclerae appear normal.  Pupils equally round. ENT:  Nares appear normal, no drainage. Moist oral mucosa  Neck:  No restricted ROM. Trachea midline   CV:   RRR. No m/r/g. No jugular venous distension. Lungs:   Decreased breath sounds, no wheezing, rhonchi, or rales. Respirations even, unlabored  Abdomen: Bowel sounds present. Soft, nontender, nondistended. Extremities: No tenderness to palpation RLE  Skin:     No rashes and normal coloration. Warm and dry.     Neuro:  Sleepy &Ox1  Psych:  Affect flat    I have reviewed ordered lab tests and independently visualized imaging below:    Recent Labs:  Recent Results (from the past 48 hour(s))   CBC WITH AUTOMATED DIFF    Collection Time: 12/15/21  6:41 PM   Result Value Ref Range    WBC 10.6 4.3 - 11.1 K/uL    RBC 4.36 4.05 - 5.2 M/uL    HGB 13.3 11.7 - 15.4 g/dL    HCT 40.5 35.8 - 46.3 %    MCV 92.9 79.6 - 97.8 FL    MCH 30.5 26.1 - 32.9 PG    MCHC 32.8 31.4 - 35.0 g/dL    RDW 12.8 11.9 - 14.6 %    PLATELET 219 692 - 801 K/uL    MPV 10.2 9.4 - 12.3 FL    ABSOLUTE NRBC 0.00 0.0 - 0.2 K/uL    DF AUTOMATED      NEUTROPHILS 86 (H) 43 - 78 %    LYMPHOCYTES 5 (L) 13 - 44 %    MONOCYTES 6 4.0 - 12.0 %    EOSINOPHILS 1 0.5 - 7.8 %    BASOPHILS 0 0.0 - 2.0 %    IMMATURE GRANULOCYTES 1 0.0 - 5.0 %    ABS. NEUTROPHILS 9.1 (H) 1.7 - 8.2 K/UL    ABS. LYMPHOCYTES 0.6 0.5 - 4.6 K/UL    ABS. MONOCYTES 0.6 0.1 - 1.3 K/UL    ABS. EOSINOPHILS 0.1 0.0 - 0.8 K/UL    ABS. BASOPHILS 0.0 0.0 - 0.2 K/UL    ABS. IMM. GRANS. 0.2 0.0 - 0.5 K/UL   METABOLIC PANEL, BASIC    Collection Time: 12/15/21  6:41 PM   Result Value Ref Range    Sodium 145 136 - 145 mmol/L    Potassium 3.0 (L) 3.5 - 5.1 mmol/L    Chloride 107 98 - 107 mmol/L    CO2 26 21 - 32 mmol/L    Anion gap 12 7 - 16 mmol/L    Glucose 95 65 - 100 mg/dL    BUN 25 (H) 8 - 23 MG/DL    Creatinine 0.70 0.6 - 1.0 MG/DL    GFR est AA >60 >60 ml/min/1.73m2    GFR est non-AA >60 >60 ml/min/1.73m2    Calcium 9.6 8.3 - 10.4 MG/DL   HEPATIC FUNCTION PANEL    Collection Time: 12/15/21  6:41 PM   Result Value Ref Range    Protein, total 6.9 6.3 - 8.2 g/dL    Albumin 3.8 3.2 - 4.6 g/dL    Globulin 3.1 2.3 - 3.5 g/dL    A-G Ratio 1.2 1.2 - 3.5      Bilirubin, total 1.0 0.2 - 1.1 MG/DL    Bilirubin, direct 0.3 <0.4 MG/DL    Alk.  phosphatase 87 50 - 136 U/L    AST (SGOT) 16 15 - 37 U/L    ALT (SGPT) 19 12 - 65 U/L   MAGNESIUM    Collection Time: 12/15/21  6:41 PM   Result Value Ref Range    Magnesium 2.1 1.8 - 2.4 mg/dL   COVID-19 RAPID TEST    Collection Time: 12/16/21  2:29 AM   Result Value Ref Range    Specimen source NOT SPECIFIED      COVID-19 rapid test Not detected NOTD     TYPE & SCREEN    Collection Time: 12/16/21  3:12 AM Result Value Ref Range    Crossmatch Expiration 12/19/2021,2359     ABO/Rh(D) O POSITIVE     Antibody screen NEG    MSSA/MRSA SC BY PCR, NASAL SWAB    Collection Time: 12/16/21  4:46 AM    Specimen: Nasal swab   Result Value Ref Range    Special Requests: NO SPECIAL REQUESTS      Culture result:        SA target not detected. A MRSA NEGATIVE, SA NEGATIVE test result does not preclude MRSA or SA nasal colonization. URINALYSIS W/ RFLX MICROSCOPIC    Collection Time: 12/16/21  5:34 AM   Result Value Ref Range    Color TORSTEN      Appearance CLOUDY      Specific gravity 1.028 (H) 1.001 - 1.023      pH (UA) 6.0 5.0 - 9.0      Protein 30 (A) NEG mg/dL    Glucose Negative mg/dL    Ketone >80 (A) NEG mg/dL    Bilirubin MODERATE (A) NEG      Blood MODERATE (A) NEG      Urobilinogen 1.0 0.2 - 1.0 EU/dL    Nitrites Negative NEG      Leukocyte Esterase SMALL (A) NEG      WBC 20-50 0 /hpf    RBC 5-10 0 /hpf    Bacteria 1+ (H) 0 /hpf    Casts HYALINE 0 /lpf    Mucus 1+ (H) 0 /lpf    Other observations RESULTS VERIFIED MANUALLY         All Micro Results     Procedure Component Value Units Date/Time    MSSA/MRSA SC BY PCR, NASAL SWAB [[id number]] Collected: 12/16/21 0446    Order Status: Completed Specimen: Nasal swab Updated: 12/16/21 0706     Special Requests: NO SPECIAL REQUESTS        Culture result:       SA target not detected. A MRSA NEGATIVE, SA NEGATIVE test result does not preclude MRSA or SA nasal colonization. COVID-19 RAPID TEST [[id number]] Collected: 12/16/21 0229    Order Status: Completed Specimen: Nasopharyngeal Updated: 12/16/21 0309     Specimen source NOT SPECIFIED        COVID-19 rapid test Not detected        Comment:      The specimen is NEGATIVE for SARS-CoV-2, the novel coronavirus associated with COVID-19. A negative result does not rule out COVID-19.        This test has been authorized by the FDA under an Emergency Use Authorization (EUA) for use by authorized laboratories. Fact sheet for Healthcare Providers: ConventionUpdate.co.nz  Fact sheet for Patients: ConventionUpdate.co.nz       Methodology: Isothermal Nucleic Acid Amplification               Other Studies:  XR CHEST SNGL V    Result Date: 12/15/2021  CHEST X-RAY, single portable view  12/15/2021 History: Hip fracture protocol. Technique: Single frontal view of the chest. Comparison: None. Findings: The cardiac silhouette is normal in respect to size. The aorta is tortuous and ectatic most commonly resulting from chronic hypertension. The lungs are expanded without evidence for pneumothorax. No consolidative airspace process or pleural effusion is seen. Postsurgical changes are seen in the proximal left humerus and chronic deformity is seen of the proximal right humerus. 1.  No acute cardiopulmonary process evident on single frontal view of the chest. This report was made using voice transcription. Despite my best efforts to avoid any, transcription errors may persist. If there is any question about the accuracy of the report or need for clarification, then please call [phone], or text me through perfectserv for clarification or correction. XR HIP RT W OR WO PELV 2-3 VWS    Result Date: 12/15/2021  RIGHT HIP AND FEMUR RADIOGRAPHS, 12/15/2021. CLINICAL HISTORY: Fall with pain. COMPARISON STUDY: None. FINDINGS: RIGHT HIP: A frontal view of the pelvis and AP and lateral view of the right hip are submitted for evaluation. The sacroiliac joints and pubic symphysis are intact. The pelvic ring is grossly intact. AP and lateral views of the right hip show a dislocation of the femoral head. However, there is a comminuted intertrochanteric fracture of the proximal right femur. There is mild proximal migration of the major distal fracture fragment with a secondary mild varus deformity about the fracture site.  RIGHT FEMUR: AP and lateral views of the right femur once again demonstrate the proximal right femoral fracture. No additional fracture of the right femur is seen. A right total knee replacement is present. 1. Comminuted intertrochanteric fracture of the proximal right femur as described above. XR FEMUR RT 2 VS    Result Date: 12/15/2021  RIGHT HIP AND FEMUR RADIOGRAPHS, 12/15/2021. CLINICAL HISTORY: Fall with pain. COMPARISON STUDY: None. FINDINGS: RIGHT HIP: A frontal view of the pelvis and AP and lateral view of the right hip are submitted for evaluation. The sacroiliac joints and pubic symphysis are intact. The pelvic ring is grossly intact. AP and lateral views of the right hip show a dislocation of the femoral head. However, there is a comminuted intertrochanteric fracture of the proximal right femur. There is mild proximal migration of the major distal fracture fragment with a secondary mild varus deformity about the fracture site. RIGHT FEMUR: AP and lateral views of the right femur once again demonstrate the proximal right femoral fracture. No additional fracture of the right femur is seen. A right total knee replacement is present. 1. Comminuted intertrochanteric fracture of the proximal right femur as described above. CT HEAD WO CONT    Result Date: 12/15/2021  EXAMINATION: CT HEAD WO CONT, CT SPINE CERV WO CONT 12/15/2021 4:42 PM ACCESSION NUMBER:  [id number], [id number] INDICATION:  fall fall hitting head no LOC denies neck and back pain about 45 mins ago. COMPARISON: None available TECHNIQUE: Contiguous axial computed tomographic images were obtained of the head and cervical spine without intravenous contrast. Multiplanar reconstructions were performed. Radiation dose reduction techniques were used for this study:  Our CT scanners use one or all of the following: Automated exposure control, adjustment of the mA and/or kVp according to patient's size, iterative reconstruction. FINDINGS: HEAD CT:  The ventricles are within normal limits for the degree of global brain parenchymal volume loss. There is no midline shift. The basilar cisterns are patent. There is no cerebellar tonsillar ectopia or herniation. Hypodensities in the periventricular and subcortical white matter are nonspecific, but they are most likely to represent chronic microangiopathy. Prior bilateral lens replacement. There is no evidence of acute intracranial hemorrhage or extra-axial collections. There is no CT evidence of acute infarction. No evidence of skull fracture. Left humerus internal fixation on the topogram. CERVICAL SPINE CT: CRANIOCERVICAL ARTICULATION: Normally aligned. VERTEBRAL BODY ALIGNMENT: Slight retrolisthesis of C3 on C4. Minimal anterolisthesis of C5 on C6. Slight retrolisthesis of C6 on C7. POSTERIOR ELEMENTS: Normally aligned PREVERTEBRAL SOFT TISSUES: Normal in thickness. VERTEBRAL BODY HEIGHTS: There is mild anterior height loss at C5 and C6. There is sclerosis and cystic change throughout the C4 vertebral body. DEGENERATIVE FINDINGS: C6-C7 intervertebral disc space narrowing. C5-C6 and C6-C7 uncovertebral joint hypertrophy. Scattered facet arthropathy. Nuchal ligamentous ossification. INCLUDED PORTIONS OF THE SKULL BASE AND MANDIBLE: No fracture within the field-of-view. CERVICAL SPINE ACUTE FINDINGS: No evidence of acute cervical spine fracture. CERVICAL SPINAL CANAL: No high-grade cervical spinal canal stenosis within the limits of noncontrast CT. INCLUDED NECK SOFT TISSUES: Carotid bifurcation atherosclerosis bilaterally. INCLUDED LUNG APICES: Predominantly clear. 1. No evidence of acute intracranial abnormality. 2. No evidence of acute cervical spine fracture. 3. There is a mixed sclerotic and lucent appearance of the C4 vertebral body. This is favored to be a benign process such as a hemangioma.  VOICE DICTATED BY: Dr. Kathy Starks CONT    Result Date: 12/15/2021  EXAMINATION: CT HEAD WO CONT, CT SPINE CERV WO CONT 12/15/2021 4:42 PM ACCESSION NUMBER:  [id number], [id number] INDICATION:  fall fall hitting head no LOC denies neck and back pain about 45 mins ago. COMPARISON: None available TECHNIQUE: Contiguous axial computed tomographic images were obtained of the head and cervical spine without intravenous contrast. Multiplanar reconstructions were performed. Radiation dose reduction techniques were used for this study:  Our CT scanners use one or all of the following: Automated exposure control, adjustment of the mA and/or kVp according to patient's size, iterative reconstruction. FINDINGS: HEAD CT: The ventricles are within normal limits for the degree of global brain parenchymal volume loss. There is no midline shift. The basilar cisterns are patent. There is no cerebellar tonsillar ectopia or herniation. Hypodensities in the periventricular and subcortical white matter are nonspecific, but they are most likely to represent chronic microangiopathy. Prior bilateral lens replacement. There is no evidence of acute intracranial hemorrhage or extra-axial collections. There is no CT evidence of acute infarction. No evidence of skull fracture. Left humerus internal fixation on the topogram. CERVICAL SPINE CT: CRANIOCERVICAL ARTICULATION: Normally aligned. VERTEBRAL BODY ALIGNMENT: Slight retrolisthesis of C3 on C4. Minimal anterolisthesis of C5 on C6. Slight retrolisthesis of C6 on C7. POSTERIOR ELEMENTS: Normally aligned PREVERTEBRAL SOFT TISSUES: Normal in thickness. VERTEBRAL BODY HEIGHTS: There is mild anterior height loss at C5 and C6. There is sclerosis and cystic change throughout the C4 vertebral body. DEGENERATIVE FINDINGS: C6-C7 intervertebral disc space narrowing. C5-C6 and C6-C7 uncovertebral joint hypertrophy. Scattered facet arthropathy. Nuchal ligamentous ossification. INCLUDED PORTIONS OF THE SKULL BASE AND MANDIBLE: No fracture within the field-of-view.  CERVICAL SPINE ACUTE FINDINGS: No evidence of acute cervical spine fracture. CERVICAL SPINAL CANAL: No high-grade cervical spinal canal stenosis within the limits of noncontrast CT. INCLUDED NECK SOFT TISSUES: Carotid bifurcation atherosclerosis bilaterally. INCLUDED LUNG APICES: Predominantly clear. 1. No evidence of acute intracranial abnormality. 2. No evidence of acute cervical spine fracture. 3. There is a mixed sclerotic and lucent appearance of the C4 vertebral body. This is favored to be a benign process such as a hemangioma. VOICE DICTATED BY: Dr. Raymond Cervantes       Current Meds:  Current Facility-Administered Medications   Medication Dose Route Frequency    sodium chloride (NS) flush 5-40 mL  5-40 mL IntraVENous Q8H    sodium chloride (NS) flush 5-40 mL  5-40 mL IntraVENous PRN    acetaminophen (TYLENOL) tablet 650 mg  650 mg Oral Q8H    tuberculin injection 5 Units  5 Units IntraDERMal ONCE    oxyCODONE IR (ROXICODONE) tablet 5 mg  5 mg Oral Q4H PRN    potassium chloride 20 mEq in 100 ml IVPB  20 mEq IntraVENous Q2H    ceFAZolin (ANCEF) 2 g/20 mL in sterile water IV syringe  2 g IntraVENous ON CALL TO OR    cefTRIAXone (ROCEPHIN) 1 g in 0.9% sodium chloride (MBP/ADV) 50 mL MBP  1 g IntraVENous Q24H    [START ON 12/17/2021] lisinopriL (PRINIVIL, ZESTRIL) tablet 40 mg  40 mg Oral DAILY    cyanocobalamin tablet 1,000 mcg  1,000 mcg Oral DAILY    divalproex (DEPAKOTE SPRINKLE) capsule 125 mg  125 mg Oral BID    donepeziL (ARICEPT) tablet 10 mg  10 mg Oral QHS    memantine (NAMENDA) tablet 10 mg  10 mg Oral BID    [Held by provider] enoxaparin (LOVENOX) injection 40 mg  40 mg SubCUTAneous Q24H    morphine injection 0.5 mg  0.5 mg IntraVENous Q4H PRN       Signed:  REGGIE Damon    Part of this note may have been written by using a voice dictation software. The note has been proof read but may still contain some grammatical/other typographical errors.

## 2021-12-16 NOTE — PROGRESS NOTES
12/16/21 0630   Dual Skin Pressure Injury Assessment   Dual Skin Pressure Injury Assessment WDL   Second Care Provider (Based on Facility Policy) Sammie Finney RN   Skin Integumentary   Skin Integumentary (WDL) X   Skin Color Ecchymosis (comment)  (Scattered. )   Skin Condition/Temp Dry;Fragile; Warm   Skin Integrity Scars (comment)   Turgor Non-tenting   Hair Growth Present   Nails X   Exceptions to WDL Thick   Varicosities Absent    Pressure  Injury Documentation No Pressure Injury Noted-Pressure Ulcer Prevention Initiated   Wound Prevention and Protection Methods   Orientation of Wound Prevention Posterior   Location of Wound Prevention Sacrum/Coccyx   Wound Offloading (Prevention Methods) Bed, pressure reduction mattress;Pillows;Repositioning     Old sacral area, allevyn applied. Reddened liza area. Heels intact. Mucous membranes pink, moist, intact. Thick, discolored nails.

## 2021-12-16 NOTE — ED NOTES
Pt is pleasantly confused x4. Hx dementia. Celaya  Flaky skin. op prep hiba cleanse completed. # 16 brenner placed without difficulty. no family present

## 2021-12-16 NOTE — PROGRESS NOTES
Spoke with Margot WELLS at [place] [address] to find out how she takes her meds. She takes them crushed in food.

## 2021-12-16 NOTE — PROGRESS NOTES
CM consult received to assist with discharge planning. Patient is admitted with closed R hip fracture. Patient is a resident at [facility]. Surgery is planned for Friday, 12/17/2021. Initial therapy , pending medical stability, should start Saturday, 12/18/2021. Patient is insured with [facility] and will need insurance authorization prior to admission to facility. This will need to be initiated once therapy recommendations have been received. This CM called patient's daughter to discuss discharge planning as patient has dementia. Demographics, PCP and insurance reviewed. Daughter would like referral sent to [facility]. Patient is vaccinated x 2 for Covid. Has not received booster. PPD has been started and Covid testing was negative on 12/15/2021. CM will continue to follow for ongoing discharge planning. Care Management Interventions  PCP Verified by CM:  Yes  Mode of Transport at Discharge: BLS  Transition of Care Consult (CM Consult): Discharge Planning,SNF  Partner SNF: Yes  Support Systems: Assisted Living,Child(carlos eduardo)  Confirm Follow Up Transport: Family  Discharge Location  Discharge Placement: Unable to determine at this time

## 2021-12-16 NOTE — ED NOTES
Verbal report given to [facility] ALIX SCHWARZ for continuation of patient care upon shift change. Patient care transferred at this time.

## 2021-12-16 NOTE — H&P
Hospitalist History and Physical   Admit Date:  12/15/2021  3:57 PM   Name:  Trey Obrien   Age:  80 y.o. Sex:  female  :  1940   MRN:  [id number]   Room:  [place] [address]    Presenting Complaint: Fall    Reason(s) for Admission: No admission diagnoses are documented for this encounter. History of Present Illness:   Trey Obrien is a 80 y.o. female with medical history of severe dementia who presented from [place] [address] in memory unit after SNF s/p fall hitting her head associated with R leg pain. Unclear what happened but per EMS, patient fell backward and struck her head but did not have LOC. At bedside, patient was pleasantly demented, alert but not oriented to person, place or time. Stated that she was \"doing okay. \"  In ED, VSS. CT head and CT C-spine were unremarkable. CXR unremarkable. Hip x-ray shows comminuted intertrochanteric fracture of the proximal right femur. Labs on admission only notable for K3.0. Review of Systems:  Unable to obtain d/t dementia  Assessment & Plan:     R femur fracture  Ortho consulted, appreciate recs  Bed rest  NPO after midnight  Ancef 1x for surgical ppx pre-op  Hold Am Lovenox SQ  Analgesics as needed  Antiemetics as needed  Rapid Covid-19 for pre-op  PT/OT post Ortho evaluation    Severe dementia  Continue home Depakote, Aricept, Namenda    HTN  Continue home lisinopril      Dispo/Discharge Planning:     >2 midnights. PT/OT    Diet: No diet orders on file  VTE ppx: Lovenox SQ  Code status: No Order        Past History:  Past Medical History:   Diagnosis Date    Hypertension     Psychiatric disorder      No past surgical history on file. No Known Allergies   Social History     Tobacco Use    Smoking status: Not on file    Smokeless tobacco: Not on file   Substance Use Topics    Alcohol use: Not Currently      No family history on file. Family history reviewed and negative except as otherwise noted.       There is no immunization history on file for this patient. Prior to Admit Medications:  Current Outpatient Medications   Medication Instructions    cyanocobalamin 1,000 mcg, Oral, DAILY    divalproex (DEPAKOTE SPRINKLE) 125 mg, Oral, 2 TIMES DAILY    donepeziL (ARICEPT) 10 mg, Oral, EVERY BEDTIME    lisinopril (PRINIVIL, ZESTRIL) 20 mg tablet Oral, DAILY    memantine (NAMENDA) 10 mg, Oral, 2 TIMES DAILY    mirtazapine (REMERON) 30 mg tablet Oral, EVERY BEDTIME    QUEtiapine (SEROQUEL) 50 mg, Oral, DAILY    risperiDONE microspheres (RISPERDAL CONSTA) 50 mg, IntraMUSCular, TWICE A MONTH       Objective:     Patient Vitals for the past 24 hrs:   Temp Pulse Resp BP SpO2   12/15/21 1558 98.4 °F (36.9 °C) 85 22 (!) 158/116 95 %     Oxygen Therapy  O2 Sat (%): 95 % (12/15/21 1558)  O2 Device: None (Room air) (12/15/21 1558)    There is no height or weight on file to calculate BMI. No intake or output data in the 24 hours ending 12/15/21 1951      Physical Exam:  Blood pressure (!) 158/116, pulse 85, temperature 98.4 °F (36.9 °C), resp. rate 22, SpO2 95 %. General:    Well nourished. No overt distress  Head:  Normocephalic, atraumatic  Eyes:  Sclerae appear normal.  Pupils equally round. ENT:  Nares appear normal, no drainage. Moist oral mucosa  Neck:  No restricted ROM. Trachea midline   CV:   RRR. No m/r/g. No jugular venous distension. Lungs:   CTAB. No wheezing, rhonchi, or rales. Respirations even, unlabored  Abdomen: Bowel sounds present. Soft, nontender, nondistended. Extremities: No cyanosis or clubbing. Pain with right hip movements. Skin:     No rashes and normal coloration. Warm and dry. Neuro:  Pleasantly demented. Alert but not oriented with severe dementia.   Follows commands intermittently  Psych:  Pleasantly demented    I have reviewed ordered lab tests and independently visualized imaging below:    Last 24hr Labs:  Recent Results (from the past 24 hour(s))   CBC WITH AUTOMATED DIFF    Collection Time: 12/15/21 6:41 PM   Result Value Ref Range    WBC 10.6 4.3 - 11.1 K/uL    RBC 4.36 4.05 - 5.2 M/uL    HGB 13.3 11.7 - 15.4 g/dL    HCT 40.5 35.8 - 46.3 %    MCV 92.9 79.6 - 97.8 FL    MCH 30.5 26.1 - 32.9 PG    MCHC 32.8 31.4 - 35.0 g/dL    RDW 12.8 11.9 - 14.6 %    PLATELET 001 560 - 184 K/uL    MPV 10.2 9.4 - 12.3 FL    ABSOLUTE NRBC 0.00 0.0 - 0.2 K/uL    DF AUTOMATED      NEUTROPHILS 86 (H) 43 - 78 %    LYMPHOCYTES 5 (L) 13 - 44 %    MONOCYTES 6 4.0 - 12.0 %    EOSINOPHILS 1 0.5 - 7.8 %    BASOPHILS 0 0.0 - 2.0 %    IMMATURE GRANULOCYTES 1 0.0 - 5.0 %    ABS. NEUTROPHILS 9.1 (H) 1.7 - 8.2 K/UL    ABS. LYMPHOCYTES 0.6 0.5 - 4.6 K/UL    ABS. MONOCYTES 0.6 0.1 - 1.3 K/UL    ABS. EOSINOPHILS 0.1 0.0 - 0.8 K/UL    ABS. BASOPHILS 0.0 0.0 - 0.2 K/UL    ABS. IMM. GRANS. 0.2 0.0 - 0.5 K/UL   METABOLIC PANEL, BASIC    Collection Time: 12/15/21  6:41 PM   Result Value Ref Range    Sodium 145 136 - 145 mmol/L    Potassium 3.0 (L) 3.5 - 5.1 mmol/L    Chloride 107 98 - 107 mmol/L    CO2 26 21 - 32 mmol/L    Anion gap 12 7 - 16 mmol/L    Glucose 95 65 - 100 mg/dL    BUN 25 (H) 8 - 23 MG/DL    Creatinine 0.70 0.6 - 1.0 MG/DL    GFR est AA >60 >60 ml/min/1.73m2    GFR est non-AA >60 >60 ml/min/1.73m2    Calcium 9.6 8.3 - 10.4 MG/DL   HEPATIC FUNCTION PANEL    Collection Time: 12/15/21  6:41 PM   Result Value Ref Range    Protein, total 6.9 6.3 - 8.2 g/dL    Albumin 3.8 3.2 - 4.6 g/dL    Globulin 3.1 2.3 - 3.5 g/dL    A-G Ratio 1.2 1.2 - 3.5      Bilirubin, total 1.0 0.2 - 1.1 MG/DL    Bilirubin, direct 0.3 <0.4 MG/DL    Alk. phosphatase 87 50 - 136 U/L    AST (SGOT) 16 15 - 37 U/L    ALT (SGPT) 19 12 - 65 U/L       All Micro Results     None          Other Studies:  XR CHEST SNGL V    Result Date: 12/15/2021  CHEST X-RAY, single portable view  12/15/2021 History: Hip fracture protocol. Technique: Single frontal view of the chest. Comparison: None. Findings: The cardiac silhouette is normal in respect to size.  The aorta is tortuous and ectatic most commonly resulting from chronic hypertension. The lungs are expanded without evidence for pneumothorax. No consolidative airspace process or pleural effusion is seen. Postsurgical changes are seen in the proximal left humerus and chronic deformity is seen of the proximal right humerus. 1.  No acute cardiopulmonary process evident on single frontal view of the chest. This report was made using voice transcription. Despite my best efforts to avoid any, transcription errors may persist. If there is any question about the accuracy of the report or need for clarification, then please call [phone], or text me through perfectserv for clarification or correction. XR HIP RT W OR WO PELV 2-3 VWS    Result Date: 12/15/2021  RIGHT HIP AND FEMUR RADIOGRAPHS, 12/15/2021. CLINICAL HISTORY: Fall with pain. COMPARISON STUDY: None. FINDINGS: RIGHT HIP: A frontal view of the pelvis and AP and lateral view of the right hip are submitted for evaluation. The sacroiliac joints and pubic symphysis are intact. The pelvic ring is grossly intact. AP and lateral views of the right hip show a dislocation of the femoral head. However, there is a comminuted intertrochanteric fracture of the proximal right femur. There is mild proximal migration of the major distal fracture fragment with a secondary mild varus deformity about the fracture site. RIGHT FEMUR: AP and lateral views of the right femur once again demonstrate the proximal right femoral fracture. No additional fracture of the right femur is seen. A right total knee replacement is present. 1. Comminuted intertrochanteric fracture of the proximal right femur as described above. XR FEMUR RT 2 VS    Result Date: 12/15/2021  RIGHT HIP AND FEMUR RADIOGRAPHS, 12/15/2021. CLINICAL HISTORY: Fall with pain. COMPARISON STUDY: None. FINDINGS: RIGHT HIP: A frontal view of the pelvis and AP and lateral view of the right hip are submitted for evaluation.  The sacroiliac joints and pubic symphysis are intact. The pelvic ring is grossly intact. AP and lateral views of the right hip show a dislocation of the femoral head. However, there is a comminuted intertrochanteric fracture of the proximal right femur. There is mild proximal migration of the major distal fracture fragment with a secondary mild varus deformity about the fracture site. RIGHT FEMUR: AP and lateral views of the right femur once again demonstrate the proximal right femoral fracture. No additional fracture of the right femur is seen. A right total knee replacement is present. 1. Comminuted intertrochanteric fracture of the proximal right femur as described above. CT HEAD WO CONT    Result Date: 12/15/2021  EXAMINATION: CT HEAD WO CONT, CT SPINE CERV WO CONT 12/15/2021 4:42 PM ACCESSION NUMBER:  [id number], [id number] INDICATION:  fall fall hitting head no LOC denies neck and back pain about 45 mins ago. COMPARISON: None available TECHNIQUE: Contiguous axial computed tomographic images were obtained of the head and cervical spine without intravenous contrast. Multiplanar reconstructions were performed. Radiation dose reduction techniques were used for this study:  Our CT scanners use one or all of the following: Automated exposure control, adjustment of the mA and/or kVp according to patient's size, iterative reconstruction. FINDINGS: HEAD CT: The ventricles are within normal limits for the degree of global brain parenchymal volume loss. There is no midline shift. The basilar cisterns are patent. There is no cerebellar tonsillar ectopia or herniation. Hypodensities in the periventricular and subcortical white matter are nonspecific, but they are most likely to represent chronic microangiopathy. Prior bilateral lens replacement. There is no evidence of acute intracranial hemorrhage or extra-axial collections. There is no CT evidence of acute infarction. No evidence of skull fracture.  Left humerus internal fixation on the topogram. CERVICAL SPINE CT: CRANIOCERVICAL ARTICULATION: Normally aligned. VERTEBRAL BODY ALIGNMENT: Slight retrolisthesis of C3 on C4. Minimal anterolisthesis of C5 on C6. Slight retrolisthesis of C6 on C7. POSTERIOR ELEMENTS: Normally aligned PREVERTEBRAL SOFT TISSUES: Normal in thickness. VERTEBRAL BODY HEIGHTS: There is mild anterior height loss at C5 and C6. There is sclerosis and cystic change throughout the C4 vertebral body. DEGENERATIVE FINDINGS: C6-C7 intervertebral disc space narrowing. C5-C6 and C6-C7 uncovertebral joint hypertrophy. Scattered facet arthropathy. Nuchal ligamentous ossification. INCLUDED PORTIONS OF THE SKULL BASE AND MANDIBLE: No fracture within the field-of-view. CERVICAL SPINE ACUTE FINDINGS: No evidence of acute cervical spine fracture. CERVICAL SPINAL CANAL: No high-grade cervical spinal canal stenosis within the limits of noncontrast CT. INCLUDED NECK SOFT TISSUES: Carotid bifurcation atherosclerosis bilaterally. INCLUDED LUNG APICES: Predominantly clear. 1. No evidence of acute intracranial abnormality. 2. No evidence of acute cervical spine fracture. 3. There is a mixed sclerotic and lucent appearance of the C4 vertebral body. This is favored to be a benign process such as a hemangioma. VOICE DICTATED BY: Dr. Gary Ludlow CONT    Result Date: 12/15/2021  EXAMINATION: CT HEAD WO CONT, CT SPINE CERV WO CONT 12/15/2021 4:42 PM ACCESSION NUMBER:  [id number], [id number] INDICATION:  fall fall hitting head no LOC denies neck and back pain about 45 mins ago. COMPARISON: None available TECHNIQUE: Contiguous axial computed tomographic images were obtained of the head and cervical spine without intravenous contrast. Multiplanar reconstructions were performed.  Radiation dose reduction techniques were used for this study:  Our CT scanners use one or all of the following: Automated exposure control, adjustment of the mA and/or kVp according to patient's size, iterative reconstruction. FINDINGS: HEAD CT: The ventricles are within normal limits for the degree of global brain parenchymal volume loss. There is no midline shift. The basilar cisterns are patent. There is no cerebellar tonsillar ectopia or herniation. Hypodensities in the periventricular and subcortical white matter are nonspecific, but they are most likely to represent chronic microangiopathy. Prior bilateral lens replacement. There is no evidence of acute intracranial hemorrhage or extra-axial collections. There is no CT evidence of acute infarction. No evidence of skull fracture. Left humerus internal fixation on the topogram. CERVICAL SPINE CT: CRANIOCERVICAL ARTICULATION: Normally aligned. VERTEBRAL BODY ALIGNMENT: Slight retrolisthesis of C3 on C4. Minimal anterolisthesis of C5 on C6. Slight retrolisthesis of C6 on C7. POSTERIOR ELEMENTS: Normally aligned PREVERTEBRAL SOFT TISSUES: Normal in thickness. VERTEBRAL BODY HEIGHTS: There is mild anterior height loss at C5 and C6. There is sclerosis and cystic change throughout the C4 vertebral body. DEGENERATIVE FINDINGS: C6-C7 intervertebral disc space narrowing. C5-C6 and C6-C7 uncovertebral joint hypertrophy. Scattered facet arthropathy. Nuchal ligamentous ossification. INCLUDED PORTIONS OF THE SKULL BASE AND MANDIBLE: No fracture within the field-of-view. CERVICAL SPINE ACUTE FINDINGS: No evidence of acute cervical spine fracture. CERVICAL SPINAL CANAL: No high-grade cervical spinal canal stenosis within the limits of noncontrast CT. INCLUDED NECK SOFT TISSUES: Carotid bifurcation atherosclerosis bilaterally. INCLUDED LUNG APICES: Predominantly clear. 1. No evidence of acute intracranial abnormality. 2. No evidence of acute cervical spine fracture. 3. There is a mixed sclerotic and lucent appearance of the C4 vertebral body. This is favored to be a benign process such as a hemangioma.  VOICE DICTATED BY: Dr. Linda Mendez Signed: Flor Montez DO    Part of this note may have been written by using a voice dictation software. The note has been proof read but may still contain some grammatical/other typographical errors.

## 2021-12-16 NOTE — ED NOTES
Report received from ALIX Krishnamurthy; per previous RN, pt presents to the ED by EMS following a fall; pt has R hip pain and hit head; no LOC; CT head was negative; pt has R hip fracture; pt is nonverbal and altered at baseline; previous RN believes she came from a memory care facility

## 2021-12-16 NOTE — PROGRESS NOTES
TRANSFER - IN REPORT:    Verbal report received from medhat rn(name) on Kaley Godfrey  being received from ed(unit) for routine progression of care      Report consisted of patients Situation, Background, Assessment and   Recommendations(SBAR). Information from the following report(s) SBAR, Kardex, ED Summary, Intake/Output, MAR and Recent Results was reviewed with the receiving nurse. Opportunity for questions and clarification was provided. Assessment completed upon patients arrival to unit and care assumed.

## 2021-12-16 NOTE — PROGRESS NOTES
TRANSFER - OUT REPORT:    Verbal report given to [facility] RN on Rowena  being transferred to Pre-OP for ordered procedure       Report consisted of patients Situation, Background, Assessment and   Recommendations(SBAR). Information from the following report(s) SBAR was reviewed with the receiving nurse. Lines:   Peripheral IV 12/15/21 Anterior;Left;Proximal Forearm (Active)        Opportunity for questions and clarification was provided.       Patient transported with:   [employer]

## 2021-12-16 NOTE — PERIOP NOTES
TRANSFER - IN REPORT:    Verbal report received from Vivien lance RN on [facility]  being received from [id number] for ordered procedure      Report consisted of patients Situation, Background, Assessment and   Recommendations(SBAR). Information from the following report(s) SBAR and MAR was reviewed with the receiving nurse. Opportunity for questions and clarification was provided. Assessment will be completed upon patients arrival to unit and care assumed.

## 2021-12-16 NOTE — CONSULTS
Consult    Patient: Rachelle Vivar MRN: [id number]  SSN: [id number]    YOB: 1940  Age: 80 y.o. Sex: female      Subjective: Rachelle Vivar is a 80 y.o. female who has some rather significant dementia who fell and injured her right hip. She was seen emergency room and found to have a right peritrochanteric proximal femur fracture. She is really not able to give any history herself. She seems to be comfortable with everything as far as range of motion besides her right hip. Past Medical History:   Diagnosis Date    Hypertension     Psychiatric disorder      No past surgical history on file.    FAMHX -No history of inflammatory arthritis   Social History     Tobacco Use    Smoking status: Not on file    Smokeless tobacco: Not on file   Substance Use Topics    Alcohol use: Not Currently      Current Facility-Administered Medications   Medication Dose Route Frequency Provider Last Rate Last Admin    sodium chloride 0.9 % bolus infusion 250 mL  250 mL IntraVENous CONTINUOUS Naranjo, Thu, DO   250 mL at 12/16/21 0438    sodium chloride (NS) flush 5-40 mL  5-40 mL IntraVENous Q8H Naranjo, Thu, DO        sodium chloride (NS) flush 5-40 mL  5-40 mL IntraVENous PRN Naranjo, Thu, DO        acetaminophen (TYLENOL) tablet 650 mg  650 mg Oral Q8H Naranjo, Thu, DO        tuberculin injection 5 Units  5 Units IntraDERMal ONCE Naranjo, Thu, DO        ceFAZolin (ANCEF) 2 g/20 mL in sterile water IV syringe  2 g IntraVENous ONCE Naranjo, Thu, DO        oxyCODONE IR (ROXICODONE) tablet 5 mg  5 mg Oral Q4H PRN Naranjo, Thu, DO        potassium chloride 20 mEq in 100 ml IVPB  20 mEq IntraVENous Q2H Terence Paniagua MD        cyanocobalamin tablet 1,000 mcg  1,000 mcg Oral DAILY Naranjo, Thu, DO        divalproex (DEPAKOTE SPRINKLE) capsule 125 mg  125 mg Oral BID Naranjo, Thu, DO        donepeziL (ARICEPT) tablet 10 mg  10 mg Oral QHS Naranjo, Thu, DO        memantine (NAMENDA) tablet 10 mg  10 mg Oral BID Naranjo, Thu, DO        lisinopriL (PRINIVIL, ZESTRIL) tablet 20 mg  20 mg Oral DAILY Naranjo, Thu, DO        [Held by provider] enoxaparin (LOVENOX) injection 40 mg  40 mg SubCUTAneous Q24H Naranjo, Thu, DO        morphine injection 0.5 mg  0.5 mg IntraVENous Q4H PRN Naranjo, Thu, DO            No Known Allergies    Review of Systems:    She is really not able to give a review of systems  A comprehensive review of systems was negative except for that written in the History of Present Illness. Objective:     Vitals:    12/16/21 0430 12/16/21 0502 12/16/21 0636 12/16/21 0802   BP: (!) 145/87  (!) 170/81 (!) 174/96   Pulse:   67 80   Resp:   21 19   Temp:   98.3 °F (36.8 °C) 98.2 °F (36.8 °C)   SpO2:  96% 97% 97%        Physical Exam:  Physical Exam:  General:  Alert, cooperative, no distress, appears stated age. Orientation she is arousable but not oriented at least not this morning when I have examined her   Eyes:  Conjunctivae/corneas clear. PERRL, EOMs intact. Fundi benign   Ears:  Normal TMs and external ear canals both ears. Nose: Nares normal. Septum midline. Mucosa normal. No drainage or sinus tenderness. Mouth/Throat: Lips, mucosa, and tongue normal. Teeth and gums normal.   Neck: Supple, symmetrical, trachea midline, no adenopathy, thyroid: no enlargment/tenderness/nodules, no carotid bruit and no JVD. Back:   Symmetric, no curvature. ROM normal. No CVA tenderness. Lungs:   Clear to auscultation bilaterally. Heart:  Regular rate and rhythm, S1, S2 normal, no murmur, click, rub or gallop. Abdomen:   Soft, non-tender. Bowel sounds normal. No masses,  No organomegaly. No lymphadenopathy in all 4 extremities  Alignment- pt has some obvious deformity of the right hip  Range of motion- with any range of motion right hip  Vascular-distal pulses palpable in right lower extremity  Sensory/motor-deep tendon reflexes normal right lower extremity. Motor and sensory function intact.   Stability- is difficult to assess stability because of the presence of the fracture  Tenderness to palpation over the right greater trochanter area  Skin- no rashes ulcerations or open wounds right lower extremity  Gait-pt cannot put any weight on the right lower extremity      Assessment:     Hospital Problems  Never Reviewed          Codes Class Noted POA    * (Principal) Closed right hip fracture ([facility] [address] ICD-10-CM: S72.001A  ICD-9-CM: 820.8  12/15/2021 Yes        Psychiatric disorder ICD-10-CM: K39  ICD-9-CM: 298.9  Unknown Unknown            Closed displaced peritrochanteric proximal femur fracture    Xrays and or studies:    AP and lateral x-rays of the right hip shows a peritrochanteric mostly intertrochanteric right proximal femur fracture  Plan:     I have spoken to the patient's family her daughter. I explained to her the nature of her injury as well as the nature of the surgical intervention. I have explained that we would hopefully try to get this done today pending OR availability. As long as there is an OR available today we will proceed with open treatment of right proximal femur fracture with intramedullary nail fixation. For some reason there is not OR time available today we will do this first thing in the morning we will try to let her eat as soon as possible if extremities we cannot do this today.   So we will plan to proceed with formal open treatment of right proximal femur fracture with intramedullary nail fixation today in the operating room    Signed By: Gregorio Prescott MD

## 2021-12-17 ENCOUNTER — APPOINTMENT (OUTPATIENT)
Dept: GENERAL RADIOLOGY | Age: 81
DRG: 481 | End: 2021-12-17
Attending: ORTHOPAEDIC SURGERY
Payer: MEDICARE

## 2021-12-17 ENCOUNTER — ANESTHESIA (OUTPATIENT)
Dept: SURGERY | Age: 81
DRG: 481 | End: 2021-12-17
Payer: MEDICARE

## 2021-12-17 LAB
ANION GAP SERPL CALC-SCNC: 4 MMOL/L (ref 7–16)
BUN SERPL-MCNC: 25 MG/DL (ref 8–23)
CALCIUM SERPL-MCNC: 9.3 MG/DL (ref 8.3–10.4)
CHLORIDE SERPL-SCNC: 114 MMOL/L (ref 98–107)
CO2 SERPL-SCNC: 29 MMOL/L (ref 21–32)
CREAT SERPL-MCNC: 0.59 MG/DL (ref 0.6–1)
ERYTHROCYTE [DISTWIDTH] IN BLOOD BY AUTOMATED COUNT: 13.1 % (ref 11.9–14.6)
GLUCOSE SERPL-MCNC: 126 MG/DL (ref 65–100)
HCT VFR BLD AUTO: 30.5 % (ref 35.8–46.3)
HGB BLD-MCNC: 9.8 G/DL (ref 11.7–15.4)
MCH RBC QN AUTO: 30.1 PG (ref 26.1–32.9)
MCHC RBC AUTO-ENTMCNC: 32.1 G/DL (ref 31.4–35)
MCV RBC AUTO: 93.6 FL (ref 79.6–97.8)
MM INDURATION POC: 0 MM (ref 0–5)
NRBC # BLD: 0 K/UL (ref 0–0.2)
PLATELET # BLD AUTO: 147 K/UL (ref 150–450)
PMV BLD AUTO: 10.1 FL (ref 9.4–12.3)
POTASSIUM SERPL-SCNC: 3.5 MMOL/L (ref 3.5–5.1)
PPD POC: NEGATIVE NEGATIVE
RBC # BLD AUTO: 3.26 M/UL (ref 4.05–5.2)
SODIUM SERPL-SCNC: 147 MMOL/L (ref 136–145)
WBC # BLD AUTO: 9 K/UL (ref 4.3–11.1)

## 2021-12-17 PROCEDURE — 74011250637 HC RX REV CODE- 250/637: Performed by: FAMILY MEDICINE

## 2021-12-17 PROCEDURE — 74011250637 HC RX REV CODE- 250/637: Performed by: PHYSICIAN ASSISTANT

## 2021-12-17 PROCEDURE — 77030010509 HC AIRWY LMA MSK TELE -A: Performed by: STUDENT IN AN ORGANIZED HEALTH CARE EDUCATION/TRAINING PROGRAM

## 2021-12-17 PROCEDURE — 2709999900 HC NON-CHARGEABLE SUPPLY: Performed by: ORTHOPAEDIC SURGERY

## 2021-12-17 PROCEDURE — 74011250636 HC RX REV CODE- 250/636: Performed by: ORTHOPAEDIC SURGERY

## 2021-12-17 PROCEDURE — 65270000029 HC RM PRIVATE

## 2021-12-17 PROCEDURE — 77030018673: Performed by: ORTHOPAEDIC SURGERY

## 2021-12-17 PROCEDURE — 74011000258 HC RX REV CODE- 258: Performed by: PHYSICIAN ASSISTANT

## 2021-12-17 PROCEDURE — 0QS606Z REPOSITION RIGHT UPPER FEMUR WITH INTRAMEDULLARY INTERNAL FIXATION DEVICE, OPEN APPROACH: ICD-10-PCS | Performed by: ORTHOPAEDIC SURGERY

## 2021-12-17 PROCEDURE — 74011250637 HC RX REV CODE- 250/637: Performed by: ORTHOPAEDIC SURGERY

## 2021-12-17 PROCEDURE — 27245 TREAT THIGH FRACTURE: CPT | Performed by: ORTHOPAEDIC SURGERY

## 2021-12-17 PROCEDURE — 74011250636 HC RX REV CODE- 250/636: Performed by: PHYSICIAN ASSISTANT

## 2021-12-17 PROCEDURE — 80048 BASIC METABOLIC PNL TOTAL CA: CPT

## 2021-12-17 PROCEDURE — 74011250636 HC RX REV CODE- 250/636: Performed by: ANESTHESIOLOGY

## 2021-12-17 PROCEDURE — 77030017016 HC DSG ANTIMIC BARR2 S&N -B: Performed by: ORTHOPAEDIC SURGERY

## 2021-12-17 PROCEDURE — 77030040922 HC BLNKT HYPOTHRM STRY -A: Performed by: STUDENT IN AN ORGANIZED HEALTH CARE EDUCATION/TRAINING PROGRAM

## 2021-12-17 PROCEDURE — 77030008846 HC WRE K STRY -C: Performed by: ORTHOPAEDIC SURGERY

## 2021-12-17 PROCEDURE — 94760 N-INVAS EAR/PLS OXIMETRY 1: CPT

## 2021-12-17 PROCEDURE — 77030002933 HC SUT MCRYL J&J -A: Performed by: ORTHOPAEDIC SURGERY

## 2021-12-17 PROCEDURE — 73502 X-RAY EXAM HIP UNI 2-3 VIEWS: CPT

## 2021-12-17 PROCEDURE — 73552 X-RAY EXAM OF FEMUR 2/>: CPT

## 2021-12-17 PROCEDURE — 36415 COLL VENOUS BLD VENIPUNCTURE: CPT

## 2021-12-17 PROCEDURE — 76010000160 HC OR TIME 0.5 TO 1 HR INTENSV-TIER 1: Performed by: ORTHOPAEDIC SURGERY

## 2021-12-17 PROCEDURE — 77030019908 HC STETH ESOPH SIMS -A: Performed by: STUDENT IN AN ORGANIZED HEALTH CARE EDUCATION/TRAINING PROGRAM

## 2021-12-17 PROCEDURE — 76210000006 HC OR PH I REC 0.5 TO 1 HR: Performed by: ORTHOPAEDIC SURGERY

## 2021-12-17 PROCEDURE — 76060000032 HC ANESTHESIA 0.5 TO 1 HR: Performed by: ORTHOPAEDIC SURGERY

## 2021-12-17 PROCEDURE — 74011250637 HC RX REV CODE- 250/637: Performed by: INTERNAL MEDICINE

## 2021-12-17 PROCEDURE — 77030021107 HC SHFT RMR MOD DISP STRY -D: Performed by: ORTHOPAEDIC SURGERY

## 2021-12-17 PROCEDURE — C1769 GUIDE WIRE: HCPCS | Performed by: ORTHOPAEDIC SURGERY

## 2021-12-17 PROCEDURE — 74011250636 HC RX REV CODE- 250/636: Performed by: STUDENT IN AN ORGANIZED HEALTH CARE EDUCATION/TRAINING PROGRAM

## 2021-12-17 PROCEDURE — 85027 COMPLETE CBC AUTOMATED: CPT

## 2021-12-17 PROCEDURE — 2709999900 HC NON-CHARGEABLE SUPPLY

## 2021-12-17 PROCEDURE — C1713 ANCHOR/SCREW BN/BN,TIS/BN: HCPCS | Performed by: ORTHOPAEDIC SURGERY

## 2021-12-17 PROCEDURE — 77030016449 HC BIT DRL AO1 STRY -C: Performed by: ORTHOPAEDIC SURGERY

## 2021-12-17 PROCEDURE — 74011000250 HC RX REV CODE- 250: Performed by: STUDENT IN AN ORGANIZED HEALTH CARE EDUCATION/TRAINING PROGRAM

## 2021-12-17 PROCEDURE — 74011000258 HC RX REV CODE- 258: Performed by: ORTHOPAEDIC SURGERY

## 2021-12-17 DEVICE — LONG NAIL KIT R1.5, TI, RIGHT
Type: IMPLANTABLE DEVICE | Site: LEG | Status: FUNCTIONAL
Brand: GAMMA

## 2021-12-17 DEVICE — LAG SCREW, TI
Type: IMPLANTABLE DEVICE | Site: LEG | Status: FUNCTIONAL
Brand: GAMMA

## 2021-12-17 DEVICE — LOCKING SCREW, FULLY THREADED: Type: IMPLANTABLE DEVICE | Site: LEG | Status: FUNCTIONAL

## 2021-12-17 RX ORDER — ASPIRIN 325 MG
325 TABLET, DELAYED RELEASE (ENTERIC COATED) ORAL DAILY
Status: DISCONTINUED | OUTPATIENT
Start: 2021-12-18 | End: 2021-12-22 | Stop reason: HOSPADM

## 2021-12-17 RX ORDER — SODIUM CHLORIDE, SODIUM LACTATE, POTASSIUM CHLORIDE, CALCIUM CHLORIDE 600; 310; 30; 20 MG/100ML; MG/100ML; MG/100ML; MG/100ML
75 INJECTION, SOLUTION INTRAVENOUS CONTINUOUS
Status: DISCONTINUED | OUTPATIENT
Start: 2021-12-17 | End: 2021-12-18

## 2021-12-17 RX ORDER — MAG HYDROX/ALUMINUM HYD/SIMETH 200-200-20
30 SUSPENSION, ORAL (FINAL DOSE FORM) ORAL
Status: DISCONTINUED | OUTPATIENT
Start: 2021-12-17 | End: 2021-12-22 | Stop reason: HOSPADM

## 2021-12-17 RX ORDER — SODIUM CHLORIDE, SODIUM LACTATE, POTASSIUM CHLORIDE, CALCIUM CHLORIDE 600; 310; 30; 20 MG/100ML; MG/100ML; MG/100ML; MG/100ML
50 INJECTION, SOLUTION INTRAVENOUS CONTINUOUS
Status: DISCONTINUED | OUTPATIENT
Start: 2021-12-17 | End: 2021-12-21

## 2021-12-17 RX ORDER — MORPHINE SULFATE 2 MG/ML
2 INJECTION, SOLUTION INTRAMUSCULAR; INTRAVENOUS
Status: DISCONTINUED | OUTPATIENT
Start: 2021-12-17 | End: 2021-12-22 | Stop reason: HOSPADM

## 2021-12-17 RX ORDER — SODIUM CHLORIDE 0.9 % (FLUSH) 0.9 %
5-40 SYRINGE (ML) INJECTION EVERY 8 HOURS
Status: DISCONTINUED | OUTPATIENT
Start: 2021-12-17 | End: 2021-12-22 | Stop reason: HOSPADM

## 2021-12-17 RX ORDER — LIDOCAINE HYDROCHLORIDE 20 MG/ML
INJECTION, SOLUTION EPIDURAL; INFILTRATION; INTRACAUDAL; PERINEURAL AS NEEDED
Status: DISCONTINUED | OUTPATIENT
Start: 2021-12-17 | End: 2021-12-17 | Stop reason: HOSPADM

## 2021-12-17 RX ORDER — FERROUS SULFATE, DRIED 160(50) MG
1 TABLET, EXTENDED RELEASE ORAL
Status: DISCONTINUED | OUTPATIENT
Start: 2021-12-17 | End: 2021-12-21

## 2021-12-17 RX ORDER — METOPROLOL TARTRATE 25 MG/1
25 TABLET, FILM COATED ORAL EVERY 12 HOURS
Status: DISCONTINUED | OUTPATIENT
Start: 2021-12-17 | End: 2021-12-22 | Stop reason: HOSPADM

## 2021-12-17 RX ORDER — ONDANSETRON 2 MG/ML
INJECTION INTRAMUSCULAR; INTRAVENOUS AS NEEDED
Status: DISCONTINUED | OUTPATIENT
Start: 2021-12-17 | End: 2021-12-17 | Stop reason: HOSPADM

## 2021-12-17 RX ORDER — SODIUM CHLORIDE 0.9 % (FLUSH) 0.9 %
5-40 SYRINGE (ML) INJECTION AS NEEDED
Status: DISCONTINUED | OUTPATIENT
Start: 2021-12-17 | End: 2021-12-22 | Stop reason: HOSPADM

## 2021-12-17 RX ORDER — HYDROMORPHONE HYDROCHLORIDE 2 MG/ML
0.5 INJECTION, SOLUTION INTRAMUSCULAR; INTRAVENOUS; SUBCUTANEOUS
Status: DISCONTINUED | OUTPATIENT
Start: 2021-12-17 | End: 2021-12-17 | Stop reason: HOSPADM

## 2021-12-17 RX ORDER — HYDROCODONE BITARTRATE AND ACETAMINOPHEN 5; 325 MG/1; MG/1
2 TABLET ORAL AS NEEDED
Status: DISCONTINUED | OUTPATIENT
Start: 2021-12-17 | End: 2021-12-17 | Stop reason: HOSPADM

## 2021-12-17 RX ORDER — EPHEDRINE SULFATE/0.9% NACL/PF 50 MG/5 ML
SYRINGE (ML) INTRAVENOUS AS NEEDED
Status: DISCONTINUED | OUTPATIENT
Start: 2021-12-17 | End: 2021-12-17 | Stop reason: HOSPADM

## 2021-12-17 RX ORDER — MIDAZOLAM HYDROCHLORIDE 1 MG/ML
2 INJECTION, SOLUTION INTRAMUSCULAR; INTRAVENOUS
Status: ACTIVE | OUTPATIENT
Start: 2021-12-17 | End: 2021-12-18

## 2021-12-17 RX ORDER — DEXAMETHASONE SODIUM PHOSPHATE 4 MG/ML
INJECTION, SOLUTION INTRA-ARTICULAR; INTRALESIONAL; INTRAMUSCULAR; INTRAVENOUS; SOFT TISSUE AS NEEDED
Status: DISCONTINUED | OUTPATIENT
Start: 2021-12-17 | End: 2021-12-17 | Stop reason: HOSPADM

## 2021-12-17 RX ORDER — CEFAZOLIN SODIUM/WATER 2 G/20 ML
2 SYRINGE (ML) INTRAVENOUS ONCE
Status: COMPLETED | OUTPATIENT
Start: 2021-12-17 | End: 2021-12-17

## 2021-12-17 RX ORDER — LIDOCAINE HYDROCHLORIDE 10 MG/ML
0.1 INJECTION INFILTRATION; PERINEURAL AS NEEDED
Status: DISCONTINUED | OUTPATIENT
Start: 2021-12-17 | End: 2021-12-22 | Stop reason: HOSPADM

## 2021-12-17 RX ORDER — MIDAZOLAM HYDROCHLORIDE 1 MG/ML
4 INJECTION, SOLUTION INTRAMUSCULAR; INTRAVENOUS ONCE
Status: ACTIVE | OUTPATIENT
Start: 2021-12-17 | End: 2021-12-17

## 2021-12-17 RX ORDER — OXYCODONE HYDROCHLORIDE 5 MG/1
5 TABLET ORAL
Status: DISCONTINUED | OUTPATIENT
Start: 2021-12-17 | End: 2021-12-17 | Stop reason: HOSPADM

## 2021-12-17 RX ORDER — FENTANYL CITRATE 50 UG/ML
INJECTION, SOLUTION INTRAMUSCULAR; INTRAVENOUS AS NEEDED
Status: DISCONTINUED | OUTPATIENT
Start: 2021-12-17 | End: 2021-12-17 | Stop reason: HOSPADM

## 2021-12-17 RX ORDER — SODIUM CHLORIDE, SODIUM LACTATE, POTASSIUM CHLORIDE, CALCIUM CHLORIDE 600; 310; 30; 20 MG/100ML; MG/100ML; MG/100ML; MG/100ML
75 INJECTION, SOLUTION INTRAVENOUS CONTINUOUS
Status: DISCONTINUED | OUTPATIENT
Start: 2021-12-17 | End: 2021-12-17 | Stop reason: HOSPADM

## 2021-12-17 RX ORDER — FENTANYL CITRATE 50 UG/ML
100 INJECTION, SOLUTION INTRAMUSCULAR; INTRAVENOUS ONCE
Status: ACTIVE | OUTPATIENT
Start: 2021-12-17 | End: 2021-12-17

## 2021-12-17 RX ORDER — PROPOFOL 10 MG/ML
INJECTION, EMULSION INTRAVENOUS AS NEEDED
Status: DISCONTINUED | OUTPATIENT
Start: 2021-12-17 | End: 2021-12-17 | Stop reason: HOSPADM

## 2021-12-17 RX ADMIN — SODIUM CHLORIDE, SODIUM LACTATE, POTASSIUM CHLORIDE, AND CALCIUM CHLORIDE 75 ML/HR: 600; 310; 30; 20 INJECTION, SOLUTION INTRAVENOUS at 15:18

## 2021-12-17 RX ADMIN — Medication 1 EACH: at 14:50

## 2021-12-17 RX ADMIN — CEFTRIAXONE 1 G: 1 INJECTION, POWDER, FOR SOLUTION INTRAMUSCULAR; INTRAVENOUS at 14:38

## 2021-12-17 RX ADMIN — DONEPEZIL HYDROCHLORIDE 10 MG: 5 TABLET, FILM COATED ORAL at 21:32

## 2021-12-17 RX ADMIN — FENTANYL CITRATE 50 MCG: 50 INJECTION INTRAMUSCULAR; INTRAVENOUS at 10:06

## 2021-12-17 RX ADMIN — SODIUM CHLORIDE, SODIUM LACTATE, POTASSIUM CHLORIDE, AND CALCIUM CHLORIDE 75 ML/HR: 600; 310; 30; 20 INJECTION, SOLUTION INTRAVENOUS at 09:23

## 2021-12-17 RX ADMIN — FENTANYL CITRATE 25 MCG: 50 INJECTION INTRAMUSCULAR; INTRAVENOUS at 10:13

## 2021-12-17 RX ADMIN — ACETAMINOPHEN 650 MG: 325 TABLET ORAL at 14:31

## 2021-12-17 RX ADMIN — CEFAZOLIN SODIUM 1 G: 1 INJECTION, POWDER, FOR SOLUTION INTRAMUSCULAR; INTRAVENOUS at 20:19

## 2021-12-17 RX ADMIN — PROPOFOL 150 MG: 10 INJECTION, EMULSION INTRAVENOUS at 09:53

## 2021-12-17 RX ADMIN — Medication 5 ML: at 21:32

## 2021-12-17 RX ADMIN — MEMANTINE HYDROCHLORIDE 10 MG: 5 TABLET ORAL at 17:18

## 2021-12-17 RX ADMIN — DIVALPROEX SODIUM 125 MG: 125 CAPSULE ORAL at 17:19

## 2021-12-17 RX ADMIN — ONDANSETRON 4 MG: 2 INJECTION INTRAMUSCULAR; INTRAVENOUS at 10:02

## 2021-12-17 RX ADMIN — ACETAMINOPHEN 650 MG: 325 TABLET ORAL at 21:32

## 2021-12-17 RX ADMIN — LIDOCAINE HYDROCHLORIDE 100 MG: 20 INJECTION, SOLUTION EPIDURAL; INFILTRATION; INTRACAUDAL; PERINEURAL at 09:53

## 2021-12-17 RX ADMIN — Medication 2 G: at 10:01

## 2021-12-17 RX ADMIN — Medication 7.5 MG: at 10:21

## 2021-12-17 RX ADMIN — METOPROLOL TARTRATE 25 MG: 25 TABLET, FILM COATED ORAL at 14:31

## 2021-12-17 RX ADMIN — Medication 10 ML: at 06:22

## 2021-12-17 RX ADMIN — DEXAMETHASONE SODIUM PHOSPHATE 4 MG: 4 INJECTION, SOLUTION INTRAMUSCULAR; INTRAVENOUS at 10:02

## 2021-12-17 NOTE — PROGRESS NOTES
Hospitalist Progress Note   Admit Date:  12/15/2021  3:57 PM   Name:  Charisse Chase   Age:  80 y.o. Sex:  female  :  1940   MRN:  [id number]   Room:  PACU/PL    Presenting Complaint: Fall    Reason(s) for Admission: Closed right hip fracture [facility]) [facility] Course & Interval History:   Charisse Chase is a 80 y.o. female with medical history of severe dementia who presented from [place] [address] in memory unit after SNF s/p fall hitting her head associated with R leg pain. Unclear what happened but per EMS, patient fell backward and struck her head but did not have LOC. At bedside, patient was pleasantly demented, alert but not oriented to person, place or time. Stated that she was \"doing okay. \"    In ED, VSS. CT head and CT C-spine were unremarkable. CXR unremarkable. Hip x-ray shows comminuted intertrochanteric fracture of the proximal right femur. Labs on admission only notable for K3.0. Pt was admitted for further medical mgmt with ortho consult requested. Subjective (21): \"I need to go. \"  Severely demented 81y.o. WF laying in bed without complaints; denies right hip / RLE pain (patient was seen by me this AM prior to surgery)    Unable to ROS with patient due to severe dementia    Assessment & Plan:     Principal Problem:    Closed right hip fracture ([address] (12/15/2021)  - appreciate ortho eval; surgery later today    Active Problems:    Hypokalemia  - resolved  - mag levels wnl  - f/u AM labs      Uncontrolled HTN  - cont lisinopril  - add metoprolol bid  - monitor      UTI  - empiric rocephin  - f/u urine cx      Severe dementia  - cont apricept / namenda      Debility  - HIGH RISK FOR RECURRENT FALLS  - PT/OT pending surgery        Dispo/Discharge Planning:    - will need STR on dc    Diet:  DIET NPO  DVT PPx: per ortho post op  Code status: [address] Problems as of 2021 Never Reviewed          Codes Class Noted - Resolved POA    * (Principal) Closed right hip fracture [facility]) ICD-10-CM: S72.001A  ICD-9-CM: 820.8  12/15/2021 - Present Yes        Psychiatric disorder ICD-10-CM: X30  ICD-9-CM: 298.9  Unknown - Present Unknown              Objective:     Patient Vitals for the past 24 hrs:   Temp Pulse Resp BP SpO2   12/17/21 1123 -- 88 -- -- 97 %   12/17/21 1113 98.4 °F (36.9 °C) 85 10 (!) 166/96 99 %   12/17/21 1108 -- 83 10 (!) 171/86 100 %   12/17/21 1105 -- 80 10 (!) 186/98 (!) 79 %   12/17/21 1100 -- 93 10 (!) 190/88 (!) 87 %   12/17/21 1053 -- 77 10 (!) 176/86 100 %   12/17/21 1048 -- 73 10 (!) 178/89 100 %   12/17/21 1044 98.8 °F (37.1 °C) 77 10 (!) 180/93 100 %   12/17/21 0929 97.8 °F (36.6 °C) 61 18 (!) 173/84 99 %   12/17/21 0758 98 °F (36.7 °C) 73 -- (!) 166/89 99 %   12/17/21 0404 97.7 °F (36.5 °C) 70 18 (!) 166/90 97 %   12/17/21 0000 97.4 °F (36.3 °C) 74 18 (!) 170/90 98 %   12/16/21 2019 98.5 °F (36.9 °C) 79 18 (!) 168/94 96 %   12/16/21 1539 98.8 °F (37.1 °C) 81 14 (!) 152/94 97 %     Oxygen Therapy  O2 Sat (%): 97 % (12/17/21 1123)  Pulse via Oximetry: 88 beats per minute (12/17/21 1123)  O2 Device: None (Room air) (12/17/21 1113)    There is no height or weight on file to calculate BMI. Intake/Output Summary (Last 24 hours) at 12/17/2021 1158  Last data filed at 12/17/2021 1028  Gross per 24 hour   Intake 550 ml   Output 820 ml   Net -270 ml         Physical Exam:     Blood pressure (!) 166/96, pulse 88, temperature 98.4 °F (36.9 °C), resp. rate 10, SpO2 97 %. General:    Poorly kept, well developed. No overt distress  Head:  Normocephalic, atraumatic  Eyes:  Sclerae appear normal.  Pupils equally round. ENT:  Nares appear normal, no drainage. Moist oral mucosa  Neck:  No restricted ROM. Trachea midline   CV:   RRR. No m/r/g. No jugular venous distension. Lungs:   Decreased breath sounds, no wheezing, rhonchi, or rales. Respirations even, unlabored  Abdomen: Bowel sounds present.   Soft, nontender, nondistended. Extremities: No tenderness to palpation RLE  Skin:     No rashes and normal coloration. Warm and dry. Neuro:  Awake, Ox1; no focal deficits  Psych:  Affect flat    I have reviewed ordered lab tests and independently visualized imaging below:    Recent Labs:  Recent Results (from the past 48 hour(s))   CBC WITH AUTOMATED DIFF    Collection Time: 12/15/21  6:41 PM   Result Value Ref Range    WBC 10.6 4.3 - 11.1 K/uL    RBC 4.36 4.05 - 5.2 M/uL    HGB 13.3 11.7 - 15.4 g/dL    HCT 40.5 35.8 - 46.3 %    MCV 92.9 79.6 - 97.8 FL    MCH 30.5 26.1 - 32.9 PG    MCHC 32.8 31.4 - 35.0 g/dL    RDW 12.8 11.9 - 14.6 %    PLATELET 698 270 - 095 K/uL    MPV 10.2 9.4 - 12.3 FL    ABSOLUTE NRBC 0.00 0.0 - 0.2 K/uL    DF AUTOMATED      NEUTROPHILS 86 (H) 43 - 78 %    LYMPHOCYTES 5 (L) 13 - 44 %    MONOCYTES 6 4.0 - 12.0 %    EOSINOPHILS 1 0.5 - 7.8 %    BASOPHILS 0 0.0 - 2.0 %    IMMATURE GRANULOCYTES 1 0.0 - 5.0 %    ABS. NEUTROPHILS 9.1 (H) 1.7 - 8.2 K/UL    ABS. LYMPHOCYTES 0.6 0.5 - 4.6 K/UL    ABS. MONOCYTES 0.6 0.1 - 1.3 K/UL    ABS. EOSINOPHILS 0.1 0.0 - 0.8 K/UL    ABS. BASOPHILS 0.0 0.0 - 0.2 K/UL    ABS. IMM. GRANS. 0.2 0.0 - 0.5 K/UL   METABOLIC PANEL, BASIC    Collection Time: 12/15/21  6:41 PM   Result Value Ref Range    Sodium 145 136 - 145 mmol/L    Potassium 3.0 (L) 3.5 - 5.1 mmol/L    Chloride 107 98 - 107 mmol/L    CO2 26 21 - 32 mmol/L    Anion gap 12 7 - 16 mmol/L    Glucose 95 65 - 100 mg/dL    BUN 25 (H) 8 - 23 MG/DL    Creatinine 0.70 0.6 - 1.0 MG/DL    GFR est AA >60 >60 ml/min/1.73m2    GFR est non-AA >60 >60 ml/min/1.73m2    Calcium 9.6 8.3 - 10.4 MG/DL   HEPATIC FUNCTION PANEL    Collection Time: 12/15/21  6:41 PM   Result Value Ref Range    Protein, total 6.9 6.3 - 8.2 g/dL    Albumin 3.8 3.2 - 4.6 g/dL    Globulin 3.1 2.3 - 3.5 g/dL    A-G Ratio 1.2 1.2 - 3.5      Bilirubin, total 1.0 0.2 - 1.1 MG/DL    Bilirubin, direct 0.3 <0.4 MG/DL    Alk.  phosphatase 87 50 - 136 U/L    AST (SGOT) 16 15 - 37 U/L    ALT (SGPT) 19 12 - 65 U/L   MAGNESIUM    Collection Time: 12/15/21  6:41 PM   Result Value Ref Range    Magnesium 2.1 1.8 - 2.4 mg/dL   COVID-19 RAPID TEST    Collection Time: 12/16/21  2:29 AM   Result Value Ref Range    Specimen source NOT SPECIFIED      COVID-19 rapid test Not detected NOTD     TYPE & SCREEN    Collection Time: 12/16/21  3:12 AM   Result Value Ref Range    Crossmatch Expiration 12/19/2021,2359     ABO/Rh(D) Mitchael Asa POSITIVE     Antibody screen NEG    MSSA/MRSA SC BY PCR, NASAL SWAB    Collection Time: 12/16/21  4:46 AM    Specimen: Nasal swab   Result Value Ref Range    Special Requests: NO SPECIAL REQUESTS      Culture result:        SA target not detected. A MRSA NEGATIVE, SA NEGATIVE test result does not preclude MRSA or SA nasal colonization. URINALYSIS W/ RFLX MICROSCOPIC    Collection Time: 12/16/21  5:34 AM   Result Value Ref Range    Color TORSTEN      Appearance CLOUDY      Specific gravity 1.028 (H) 1.001 - 1.023      pH (UA) 6.0 5.0 - 9.0      Protein 30 (A) NEG mg/dL    Glucose Negative mg/dL    Ketone >80 (A) NEG mg/dL    Bilirubin MODERATE (A) NEG      Blood MODERATE (A) NEG      Urobilinogen 1.0 0.2 - 1.0 EU/dL    Nitrites Negative NEG      Leukocyte Esterase SMALL (A) NEG      WBC 20-50 0 /hpf    RBC 5-10 0 /hpf    Bacteria 1+ (H) 0 /hpf    Casts HYALINE 0 /lpf    Mucus 1+ (H) 0 /lpf    Other observations RESULTS VERIFIED MANUALLY     CULTURE, URINE    Collection Time: 12/16/21  5:34 AM    Specimen: Cath Urine   Result Value Ref Range    Special Requests: NO SPECIAL REQUESTS      Culture result:        NO GROWTH AFTER SHORT PERIOD OF INCUBATION. FURTHER RESULTS TO FOLLOW AFTER OVERNIGHT INCUBATION.    METABOLIC PANEL, BASIC    Collection Time: 12/16/21  3:58 PM   Result Value Ref Range    Sodium 147 (H) 136 - 145 mmol/L    Potassium 3.5 3.5 - 5.1 mmol/L    Chloride 114 (H) 98 - 107 mmol/L    CO2 23 21 - 32 mmol/L    Anion gap 10 7 - 16 mmol/L    Glucose 97 65 - 100 mg/dL    BUN 21 8 - 23 MG/DL    Creatinine 0.51 (L) 0.6 - 1.0 MG/DL    GFR est AA >60 >60 ml/min/1.73m2    GFR est non-AA >60 >60 ml/min/1.73m2    Calcium 9.1 8.3 - 10.4 MG/DL   PROTHROMBIN TIME + INR    Collection Time: 12/16/21  5:20 PM   Result Value Ref Range    Prothrombin time 15.0 (H) 12.6 - 14.5 sec    INR 1.2     PTT    Collection Time: 12/16/21  5:20 PM   Result Value Ref Range    aPTT 33.6 24.1 - 35.1 SEC       All Micro Results     Procedure Component Value Units Date/Time    CULTURE, URINE [[id number]] Collected: 12/16/21 0534    Order Status: Completed Specimen: Cath Urine Updated: 12/17/21 1003     Special Requests: NO SPECIAL REQUESTS        Culture result:       NO GROWTH AFTER SHORT PERIOD OF INCUBATION. FURTHER RESULTS TO FOLLOW AFTER OVERNIGHT INCUBATION. MSSA/MRSA SC BY PCR, NASAL SWAB [[id number]] Collected: 12/16/21 0446    Order Status: Completed Specimen: Nasal swab Updated: 12/16/21 0706     Special Requests: NO SPECIAL REQUESTS        Culture result:       SA target not detected. A MRSA NEGATIVE, SA NEGATIVE test result does not preclude MRSA or SA nasal colonization. COVID-19 RAPID TEST [[id number]] Collected: 12/16/21 0229    Order Status: Completed Specimen: Nasopharyngeal Updated: 12/16/21 0309     Specimen source NOT SPECIFIED        COVID-19 rapid test Not detected        Comment:      The specimen is NEGATIVE for SARS-CoV-2, the novel coronavirus associated with COVID-19. A negative result does not rule out COVID-19. This test has been authorized by the FDA under an Emergency Use Authorization (EUA) for use by authorized laboratories.         Fact sheet for Healthcare Providers: ConventionUpdate.co.nz  Fact sheet for Patients: ConventionUpdate.co.nz       Methodology: Isothermal Nucleic Acid Amplification               Other Studies:  XR FEMUR RT 2 VS    Result Date: 12/17/2021  EXAM:  XR FEMUR RT 2 VS INDICATION:   Fracture COMPARISON: None. FINDINGS: An AP view of the pelvis and a frogleg lateral view of the right hip demonstrate a segment of the gamma nail in the right femur reducing an intertrochanteric fracture. .      Unremarkable gamma nail placement. 7 fluoroscopic images obtained. Fluoroscopy time equals 10 seconds.        Current Meds:  Current Facility-Administered Medications   Medication Dose Route Frequency    alcohol 62% (NOZIN) nasal  3 Ampule  3 Ampule Topical ONCE    lactated Ringers infusion  50 mL/hr IntraVENous CONTINUOUS    lidocaine (XYLOCAINE) 10 mg/mL (1 %) injection 0.1 mL  0.1 mL SubCUTAneous PRN    lactated Ringers infusion  75 mL/hr IntraVENous CONTINUOUS    fentaNYL citrate (PF) injection 100 mcg  100 mcg IntraVENous ONCE    midazolam (VERSED) injection 2 mg  2 mg IntraVENous ONCE PRN    midazolam (VERSED) injection 4 mg  4 mg IntraVENous ONCE    lactated Ringers infusion  75 mL/hr IntraVENous CONTINUOUS    oxyCODONE IR (ROXICODONE) tablet 5 mg  5 mg Oral ONCE PRN    HYDROcodone-acetaminophen (NORCO) 5-325 mg per tablet 2 Tablet  2 Tablet Oral PRN    HYDROmorphone (DILAUDID) injection 0.5 mg  0.5 mg IntraVENous Multiple    lip protectant (BLISTEX) ointment 1 Each  1 Each Topical PRN    sodium chloride (NS) flush 5-40 mL  5-40 mL IntraVENous Q8H    sodium chloride (NS) flush 5-40 mL  5-40 mL IntraVENous PRN    acetaminophen (TYLENOL) tablet 650 mg  650 mg Oral Q8H    oxyCODONE IR (ROXICODONE) tablet 5 mg  5 mg Oral Q4H PRN    cefTRIAXone (ROCEPHIN) 1 g in 0.9% sodium chloride (MBP/ADV) 50 mL MBP  1 g IntraVENous Q24H    lisinopriL (PRINIVIL, ZESTRIL) tablet 40 mg  40 mg Oral DAILY    cyanocobalamin tablet 1,000 mcg  1,000 mcg Oral DAILY    divalproex (DEPAKOTE SPRINKLE) capsule 125 mg  125 mg Oral BID    donepeziL (ARICEPT) tablet 10 mg  10 mg Oral QHS    memantine (NAMENDA) tablet 10 mg  10 mg Oral BID    [Held by provider] enoxaparin (LOVENOX) injection 40 mg  40 mg SubCUTAneous Q24H    morphine injection 0.5 mg  0.5 mg IntraVENous Q4H PRN       Signed:  REGGIE Phan    Part of this note may have been written by using a voice dictation software. The note has been proof read but may still contain some grammatical/other typographical errors.

## 2021-12-17 NOTE — OP NOTES
Operative Report    Patient: Paulette Warner MRN: [id number]  SSN: [id number]    YOB: 1940  Age: 80 y.o. Sex: female       Date of Surgery: 12/17/2021     History:  Paulette Warner is a 80 y.o. female who who fell and injured her right hip. He was seen emergency room and found to have a right intertrochanteric proximal femur fracture. To have a chance to talk to her and her daughter regarding the nature of her injury and the nature of the operative intervention. We had attempted to take her to the operating room on the day prior but due to or availability she was rescheduled for today. I did think it was reasonable to going proceed with operative intervention of her right proximal enteroenteric femur fracture      I talked to the patient and/or their representative and explained the exact nature the procedure. I also went through a detailed list of the material risks associated with  the procedure which included risk of bleeding, infection, injury to nearby structures, worsening the situation, as well as the risks associate with anesthesia and finally death. Also talked with him regarding the benefits and alternatives to the procedure. 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   I then prepped and draped the right hip and thigh area and made a small incision just proximal to the area the greater trochanter and placed the cannulated awl into the tip the greater trochanter on both the AP and lateral projection and once it was in appropriate position placed a guidewire down the shaft of the femur and then reamed sequentially up to 13 mm for the shaft of the femur and to 15.5 mm proximally. I then measured for a 400 mm nail and placed the actual nail. Once the nail was in appropriate position I placed a guidewire in the center of the femoral head on both the AP and lateral projection. Once this was in appropriate position I drilled for and placed a 95 mm lag screw proximally. Once this was in position I then placed a set screw in the proximal portion of the nail and tightened this all the way down and backed off a half of a turn to allow for dynamic compression. I then placed a single interlock bolt distally using freehand technique. I then removed the insertion handle and checked the final reduction as well as the placement of the hardware. I was very pleased with this I then closed incisions with 2.0 Monocryl for the subcutaneous tissue and staples for the skin. Dressings were applied. The patient was awakened and taken recovery in stable condition there were no apparent complications      Estimated Blood Loss: 150 cc    Tourniquet Time: * No tourniquets in log *      Implants:   Implant Name Type Inv.  Item Serial No.  Lot No. LRB No. Used Action   NAIL IM [id number] [id number] RAD OF CURVATURE R1.5 125DEG R - [id number]  NAIL IM L400MM [id number] RAD OF CURVATURE R1.5 125DEG R  [employer] [id number] Right 1 Implanted   SCR BNE LAG GAMMA 3 10.5X95MM -- TI STRL - [id number]  SCR BNE LAG GAMMA 3 10.5X95MM -- TI STRL  [employer] [id number] Right 1 Implanted   SCR BNE LCK T2 FT 5X50MM TI -- STRL - [id number]  SCR BNE LCK T2 FT 5X50MM TI -- STRL  [employer] [id number] Right 1 Implanted               Specimens: * No specimens in log *        Drains: None                Complications: None    Counts: Sponge and needle counts were correct times two.     Signed By:  Thuan Vera MD     December 17, 2021

## 2021-12-17 NOTE — ANESTHESIA PREPROCEDURE EVALUATION
Anesthetic History   No history of anesthetic complications            Review of Systems / Medical History  Patient summary reviewed and pertinent labs reviewed    Pulmonary  Within defined limits                 Neuro/Psych         Dementia (Alzheimer's. Needs help with ADLs.)     Cardiovascular    Hypertension              Exercise tolerance: <4 METS     GI/Hepatic/Renal  Within defined limits              Endo/Other  Within defined limits           Other Findings            Physical Exam    Airway  Mallampati: II  TM Distance: < 4 cm  Neck ROM: normal range of motion   Mouth opening: Normal    Comments: Not cooperative with exam Cardiovascular  Regular rate and rhythm,  S1 and S2 normal,  no murmur, click, rub, or gallop             Dental    Dentition: Poor dentition     Pulmonary  Breath sounds clear to auscultation               Abdominal  GI exam deferred       Other Findings            Anesthetic Plan    ASA: 3  Anesthesia type: general          Induction: Intravenous  Anesthetic plan and risks discussed with: Patient      History obtained from chart review and speaking with patient's daughter and Saida Mal, who also gave informed consent for anesthesia. Patient has significant dementia and has soft restraints on. Although patient is not on blood thinners per daughter and SNF records, due to her inability to cooperate and severe dementia, she will likely require GA.

## 2021-12-17 NOTE — PROGRESS NOTES
Patient may be weightbearing as tolerated on the right lower extremity. They can be full active and passive range of motion of the right hip. They can have dry dressing change starting on postoperative day 2 and then after that daily and as needed. They will need aspirin 325 mg 1 p.o. daily for 4 weeks as DVT prophylaxis as well as SCDs while hospitalized unless they are on a preoperative anticoagulant chronically and in this case they can restart this on postoperative day 2 and use this instead of the aspirin. They will need to have orthopedic follow-up approximately 2 weeks after discharge.

## 2021-12-17 NOTE — PROGRESS NOTES
TRANSFER - IN REPORT:    Verbal report received from [facility] on Rowena  being received from [place] for routine post - op      Report consisted of patients Situation, Background, Assessment and   Recommendations(SBAR). Information from the following report(s) SBAR was reviewed with the receiving nurse. Opportunity for questions and clarification was provided. Assessment completed upon patients arrival to unit and care assumed.

## 2021-12-17 NOTE — PROGRESS NOTES
The documentation for this period (12/17/2021 from 0000am-0548am) is being entered following the guidelines as defined in the [facility] policy by Pastor Jordan RN.

## 2021-12-17 NOTE — INTERVAL H&P NOTE
Update History & Physical    The Patient's History and Physical of 12/15/2021 was reviewed with the patient and I examined the patient. There was no change. The surgical site was confirmed by the patient and me. Plan:  The risk, benefits, expected outcome, and alternative to the recommended procedure have been discussed with the patient. Patient understands and wants to proceed with open treatment of right proximal femur fracture with intramedullary nail fixation.       Electronically signed by Senthil Hernadez MD on 12/17/2021 at 6:52 AM

## 2021-12-17 NOTE — PROGRESS NOTES
CM notes that insurance information was updated in CC to reflect patient's primary coverage is traditional Medicare. Therefore, patient will not need insurance authorization prior to discharge. [facility] liaison aware of update. Daughter updated over phone that patient may discharge to [facility] Monday if medically ready.

## 2021-12-17 NOTE — ANESTHESIA POSTPROCEDURE EVALUATION
Procedure(s):  RIGHT GAMMA NAIL INSERTION. general    Anesthesia Post Evaluation      Multimodal analgesia: multimodal analgesia used between 6 hours prior to anesthesia start to PACU discharge  Patient location during evaluation: PACU  Patient participation: complete - patient participated  Level of consciousness: awake and alert  Pain score: 0  Pain management: adequate  Airway patency: patent  Anesthetic complications: no  Cardiovascular status: acceptable and hemodynamically stable  Respiratory status: acceptable and spontaneous ventilation  Hydration status: acceptable  Post anesthesia nausea and vomiting:  none  Final Post Anesthesia Temperature Assessment:  Normothermia (36.0-37.5 degrees C)      INITIAL Post-op Vital signs:   Vitals Value Taken Time   /96 12/17/21 1113   Temp 36.9 °C (98.4 °F) 12/17/21 1113   Pulse 103 12/17/21 1115   Resp 10 12/17/21 1113   SpO2 98 % 12/17/21 1115   Vitals shown include unvalidated device data.

## 2021-12-17 NOTE — DISCHARGE INSTRUCTIONS
[address]    IF YOU HAVE ANY PROBLEMS ONCE YOU ARE AT HOME CALL THE FOLLOWING NUMBERS:   Main office number: [phone] ask for Rehana Bhandari (medical assistant with Dr. Mikal Oliva)  [facility] [address] [place] [address] [address] [address], [address]      Patient Discharge Instructions    Kaley Godfrey / [id number] : 1940    Admitted 12/15/2021 Discharged: 2021         To be given to [address] on Admission         Weight bearing status: As tolerated with walker and assistance    Activity  · Continue Physical Therapy and Occupational Therapy   · Fall precautions     Wound Care   Dry dressing changes using sterile technique every other day or more frequently if needed    Staples are to be left in and removed in our office 2 weeks postop    Diet  · Resume regular or diabetic diet      Medications    · Patient medications are listed on the medication reconciliation sheet. Follow up    Follow up in our office in 2 weeks postop    All patients are to be transported via stretcher unless they are able to independently get out of a chair and stand without assistance. Information obtained by :  I understand that if any problems occur once I am at home I am to contact my physician. I understand and acknowledge receipt of the instructions indicated above.                                                                                                                                            Physician's or R.N.'s Signature                                                                  Date/Time                                                                                                                                              Patient or Representative Signature                                                          Date/Time

## 2021-12-17 NOTE — PROGRESS NOTES
Problem: Falls - Risk of  Goal: *Absence of Falls  Description: Document Jim Fernández Fall Risk and appropriate interventions in the flowsheet.   Outcome: Progressing Towards Goal  Note: Fall Risk Interventions:  Mobility Interventions: Bed/chair exit alarm,OT consult for ADLs,Patient to call before getting OOB,PT Consult for mobility concerns    Mentation Interventions: Bed/chair exit alarm,Door open when patient unattended    Medication Interventions: Bed/chair exit alarm,Patient to call before getting OOB,Teach patient to arise slowly    Elimination Interventions: Bed/chair exit alarm,Call light in reach,Patient to call for help with toileting needs,Toileting schedule/hourly rounds    History of Falls Interventions: Bed/chair exit alarm,Consult care management for discharge planning,Door open when patient unattended         Problem: Patient Education: Go to Patient Education Activity  Goal: Patient/Family Education  Outcome: Progressing Towards Goal     Problem: Patient Education: Go to Patient Education Activity  Goal: Patient/Family Education  Outcome: Progressing Towards Goal     Problem: Hip Fracture:Day of Admission Pre-op  Goal: Off Pathway (Use only if patient is Off Pathway)  Outcome: Progressing Towards Goal  Goal: Activity/Safety  Outcome: Progressing Towards Goal  Goal: Consults, if ordered  Outcome: Progressing Towards Goal  Goal: Diagnostic Test/Procedures  Outcome: Progressing Towards Goal  Goal: Nutrition/Diet  Outcome: Progressing Towards Goal  Goal: Medications  Outcome: Progressing Towards Goal  Goal: Respiratory  Outcome: Progressing Towards Goal  Goal: Treatments/Interventions/Procedures  Outcome: Progressing Towards Goal  Goal: Psychosocial  Outcome: Progressing Towards Goal  Goal: *Labs/Tests Within Defined Limits in Preparation for Surgery  Outcome: Progressing Towards Goal  Goal: *Optimal pain control at patient's stated goal  Outcome: Progressing Towards Goal  Goal: *Hemodynamically stable  Outcome: Progressing Towards Goal     Problem: Hip Fracture: Day of Surgery Post-op Care  Goal: Off Pathway (Use only if patient is Off Pathway)  Outcome: Progressing Towards Goal  Goal: Activity/Safety  Outcome: Progressing Towards Goal  Goal: Consults, if ordered  Outcome: Progressing Towards Goal  Goal: Diagnostic Test/Procedures  Outcome: Progressing Towards Goal  Goal: Nutrition/Diet  Outcome: Progressing Towards Goal  Goal: Medications  Outcome: Progressing Towards Goal  Goal: Respiratory  Outcome: Progressing Towards Goal  Goal: Treatments/Interventions/Procedures  Outcome: Progressing Towards Goal  Goal: Psychosocial  Outcome: Progressing Towards Goal  Goal: *Absence of skin breakdown  Outcome: Progressing Towards Goal  Goal: *Optimal pain control at patient's stated goal  Outcome: Progressing Towards Goal  Goal: *Hemodynamically stable  Outcome: Progressing Towards Goal     Problem: Hip Fracture: Post-Op Day 1  Goal: Off Pathway (Use only if patient is Off Pathway)  Outcome: Progressing Towards Goal  Goal: Activity/Safety  Outcome: Progressing Towards Goal  Goal: Diagnostic Test/Procedures  Outcome: Progressing Towards Goal  Goal: Nutrition/Diet  Outcome: Progressing Towards Goal  Goal: Medications  Outcome: Progressing Towards Goal  Goal: Respiratory  Outcome: Progressing Towards Goal  Goal: Treatments/Interventions/Procedures  Outcome: Progressing Towards Goal  Goal: Psychosocial  Outcome: Progressing Towards Goal  Goal: Discharge Planning  Outcome: Progressing Towards Goal  Goal: *Demonstrates progressive activity  Outcome: Progressing Towards Goal  Goal: *Optimal pain control at patient's stated goal  Outcome: Progressing Towards Goal  Goal: *Hemodynamically stable  Outcome: Progressing Towards Goal  Goal: *Discharge plan identified  Outcome: Progressing Towards Goal  Goal: *Absence of skin breakdown  Outcome: Progressing Towards Goal     Problem: Hip Fracture: Post-Op Day 2  Goal: Off Pathway (Use only if patient is Off Pathway)  Outcome: Progressing Towards Goal  Goal: Activity/Safety  Outcome: Progressing Towards Goal  Goal: Diagnostic Test/Procedures  Outcome: Progressing Towards Goal  Goal: Nutrition/Diet  Outcome: Progressing Towards Goal  Goal: Medications  Outcome: Progressing Towards Goal  Goal: Respiratory  Outcome: Progressing Towards Goal  Goal: Treatments/Interventions/Procedures  Outcome: Progressing Towards Goal  Goal: Psychosocial  Outcome: Progressing Towards Goal  Goal: Discharge Planning  Outcome: Progressing Towards Goal  Goal: *Optimal pain control at patient's stated goal  Outcome: Progressing Towards Goal  Goal: *Hemodynamically stable  Outcome: Progressing Towards Goal  Goal: *Adequate oxygenation  Outcome: Progressing Towards Goal  Goal: *Tolerates increased activity  Outcome: Progressing Towards Goal  Goal: *Tolerates nutrition therapy  Outcome: Progressing Towards Goal  Goal: *Absence of skin breakdown  Outcome: Progressing Towards Goal     Problem: Hip Fracture: Post-Op Day 3  Goal: Off Pathway (Use only if patient is Off Pathway)  Outcome: Progressing Towards Goal  Goal: Activity/Safety  Outcome: Progressing Towards Goal  Goal: Diagnostic Test/Procedures  Outcome: Progressing Towards Goal  Goal: Nutrition/Diet  Outcome: Progressing Towards Goal  Goal: Medications  Outcome: Progressing Towards Goal  Goal: Respiratory  Outcome: Progressing Towards Goal  Goal: Treatments/Interventions/Procedures  Outcome: Progressing Towards Goal  Goal: Psychosocial  Outcome: Progressing Towards Goal  Goal: Discharge Planning  Outcome: Progressing Towards Goal  Goal: *Met physical therapy criteria for discharge to next level of care  Outcome: Progressing Towards Goal  Goal: *Optimal pain control at patient's stated goal  Outcome: Progressing Towards Goal  Goal: *Hemodynamically stable  Outcome: Progressing Towards Goal  Goal: *Tolerating diet  Outcome: Progressing Towards Goal  Goal: *Active bowel function  Outcome: Progressing Towards Goal  Goal: *Adequate urinary output  Outcome: Progressing Towards Goal  Goal: *Absence of skin breakdown  Outcome: Progressing Towards Goal  Goal: *Patient verbalizes understanding of discharge instructions  Outcome: Progressing Towards Goal     Problem: Hip Fracture: Post-Op Day 4  Goal: Off Pathway (Use only if patient is Off Pathway)  Outcome: Progressing Towards Goal  Goal: Activity/Safety  Outcome: Progressing Towards Goal  Goal: Diagnostic Test/Procedures  Outcome: Progressing Towards Goal  Goal: Nutrition/Diet  Outcome: Progressing Towards Goal  Goal: Medications  Outcome: Progressing Towards Goal  Goal: Respiratory  Outcome: Progressing Towards Goal  Goal: Treatments/Interventions/Procedures  Outcome: Progressing Towards Goal  Goal: Psychosocial  Outcome: Progressing Towards Goal  Goal: Discharge Planning  Outcome: Progressing Towards Goal  Goal: *Met physical therapy criteria for discharge to next level of care  Outcome: Progressing Towards Goal  Goal: *Optimal pain control at patient's stated goal  Outcome: Progressing Towards Goal  Goal: *Hemodynamically stable  Outcome: Progressing Towards Goal  Goal: *Tolerating diet  Outcome: Progressing Towards Goal  Goal: *Active bowel function  Outcome: Progressing Towards Goal  Goal: *Adequate urinary output  Outcome: Progressing Towards Goal  Goal: *Absence of skin breakdown  Outcome: Progressing Towards Goal  Goal: *Patient verbalizes understanding of discharge instructions  Outcome: Progressing Towards Goal

## 2021-12-17 NOTE — PERIOP NOTES
TRANSFER - OUT REPORT:    Verbal report given to ALIX Weathers on Rowena  being transferred to X-ray then 725 for routine post - op       Report consisted of patients Situation, Background, Assessment and   Recommendations(SBAR). Information from the following report(s) SBAR, Intake/Output, Cardiac Rhythm SR and Quality Measures was reviewed with the receiving nurse. Lines:   Peripheral IV 12/15/21 Anterior;Left;Proximal Forearm (Active)   Site Assessment Clean, dry, & intact 12/17/21 1044   Phlebitis Assessment 0 12/17/21 1044   Infiltration Assessment 0 12/17/21 1044   Dressing Status Clean, dry, & intact 12/17/21 1044   Dressing Type Transparent;Tape 12/17/21 1044   Hub Color/Line Status Patent 12/17/21 1044        Opportunity for questions and clarification was provided. Patient transported with:   Tech    VTE prophylaxis orders have not been written for Rowena. Patient and family given floor number and nurses name. Family updated re: pt status after security code verified.

## 2021-12-18 LAB
ANION GAP SERPL CALC-SCNC: 5 MMOL/L (ref 7–16)
BACTERIA SPEC CULT: NORMAL
BACTERIA SPEC CULT: NORMAL
BASOPHILS # BLD: 0 K/UL (ref 0–0.2)
BASOPHILS NFR BLD: 0 % (ref 0–2)
BUN SERPL-MCNC: 27 MG/DL (ref 8–23)
CALCIUM SERPL-MCNC: 9.4 MG/DL (ref 8.3–10.4)
CHLORIDE SERPL-SCNC: 114 MMOL/L (ref 98–107)
CO2 SERPL-SCNC: 30 MMOL/L (ref 21–32)
CREAT SERPL-MCNC: 0.67 MG/DL (ref 0.6–1)
DIFFERENTIAL METHOD BLD: ABNORMAL
EOSINOPHIL # BLD: 0 K/UL (ref 0–0.8)
EOSINOPHIL NFR BLD: 0 % (ref 0.5–7.8)
ERYTHROCYTE [DISTWIDTH] IN BLOOD BY AUTOMATED COUNT: 13.1 % (ref 11.9–14.6)
GLUCOSE SERPL-MCNC: 98 MG/DL (ref 65–100)
HCT VFR BLD AUTO: 27.3 % (ref 35.8–46.3)
HGB BLD-MCNC: 8.7 G/DL (ref 11.7–15.4)
IMM GRANULOCYTES # BLD AUTO: 0.2 K/UL (ref 0–0.5)
IMM GRANULOCYTES NFR BLD AUTO: 2 % (ref 0–5)
LYMPHOCYTES # BLD: 0.8 K/UL (ref 0.5–4.6)
LYMPHOCYTES NFR BLD: 12 % (ref 13–44)
MCH RBC QN AUTO: 29.1 PG (ref 26.1–32.9)
MCHC RBC AUTO-ENTMCNC: 31.9 G/DL (ref 31.4–35)
MCV RBC AUTO: 91.3 FL (ref 79.6–97.8)
MM INDURATION POC: 0 MM (ref 0–5)
MONOCYTES # BLD: 0.7 K/UL (ref 0.1–1.3)
MONOCYTES NFR BLD: 9 % (ref 4–12)
NEUTS SEG # BLD: 5.6 K/UL (ref 1.7–8.2)
NEUTS SEG NFR BLD: 77 % (ref 43–78)
NRBC # BLD: 0 K/UL (ref 0–0.2)
PLATELET # BLD AUTO: 136 K/UL (ref 150–450)
PMV BLD AUTO: 10.1 FL (ref 9.4–12.3)
POTASSIUM SERPL-SCNC: 3.7 MMOL/L (ref 3.5–5.1)
PPD POC: NEGATIVE NEGATIVE
RBC # BLD AUTO: 2.99 M/UL (ref 4.05–5.2)
SERVICE CMNT-IMP: NORMAL
SODIUM SERPL-SCNC: 149 MMOL/L (ref 136–145)
WBC # BLD AUTO: 7.2 K/UL (ref 4.3–11.1)

## 2021-12-18 PROCEDURE — 77030038269 HC DRN EXT URIN PURWCK BARD -A

## 2021-12-18 PROCEDURE — 65270000029 HC RM PRIVATE

## 2021-12-18 PROCEDURE — 2709999900 HC NON-CHARGEABLE SUPPLY

## 2021-12-18 PROCEDURE — 51798 US URINE CAPACITY MEASURE: CPT

## 2021-12-18 PROCEDURE — 74011000258 HC RX REV CODE- 258: Performed by: ORTHOPAEDIC SURGERY

## 2021-12-18 PROCEDURE — 74011250637 HC RX REV CODE- 250/637: Performed by: ORTHOPAEDIC SURGERY

## 2021-12-18 PROCEDURE — 74011250637 HC RX REV CODE- 250/637: Performed by: PHYSICIAN ASSISTANT

## 2021-12-18 PROCEDURE — 80048 BASIC METABOLIC PNL TOTAL CA: CPT

## 2021-12-18 PROCEDURE — 74011250636 HC RX REV CODE- 250/636: Performed by: ORTHOPAEDIC SURGERY

## 2021-12-18 PROCEDURE — 97165 OT EVAL LOW COMPLEX 30 MIN: CPT

## 2021-12-18 PROCEDURE — 36415 COLL VENOUS BLD VENIPUNCTURE: CPT

## 2021-12-18 PROCEDURE — 97530 THERAPEUTIC ACTIVITIES: CPT

## 2021-12-18 PROCEDURE — 97535 SELF CARE MNGMENT TRAINING: CPT

## 2021-12-18 PROCEDURE — 85025 COMPLETE CBC W/AUTO DIFF WBC: CPT

## 2021-12-18 PROCEDURE — 97163 PT EVAL HIGH COMPLEX 45 MIN: CPT

## 2021-12-18 RX ORDER — CEFPODOXIME PROXETIL 200 MG/1
200 TABLET, FILM COATED ORAL EVERY 12 HOURS
Status: DISCONTINUED | OUTPATIENT
Start: 2021-12-18 | End: 2021-12-22 | Stop reason: HOSPADM

## 2021-12-18 RX ADMIN — CEFPODOXIME PROXETIL 200 MG: 200 TABLET, FILM COATED ORAL at 22:01

## 2021-12-18 RX ADMIN — LISINOPRIL 40 MG: 20 TABLET ORAL at 08:28

## 2021-12-18 RX ADMIN — CALCIUM CARBONATE 500 MG (1,250 MG)-VITAMIN D3 200 UNIT TABLET 1 TABLET: at 17:05

## 2021-12-18 RX ADMIN — ASPIRIN 325 MG: 325 TABLET, COATED ORAL at 08:28

## 2021-12-18 RX ADMIN — MORPHINE SULFATE 2 MG: 2 INJECTION, SOLUTION INTRAMUSCULAR; INTRAVENOUS at 10:19

## 2021-12-18 RX ADMIN — DONEPEZIL HYDROCHLORIDE 10 MG: 5 TABLET, FILM COATED ORAL at 22:01

## 2021-12-18 RX ADMIN — ACETAMINOPHEN 650 MG: 325 TABLET ORAL at 22:01

## 2021-12-18 RX ADMIN — CALCIUM CARBONATE 500 MG (1,250 MG)-VITAMIN D3 200 UNIT TABLET 1 TABLET: at 12:20

## 2021-12-18 RX ADMIN — CEFAZOLIN SODIUM 1 G: 1 INJECTION, POWDER, FOR SOLUTION INTRAMUSCULAR; INTRAVENOUS at 04:17

## 2021-12-18 RX ADMIN — CEFPODOXIME PROXETIL 200 MG: 200 TABLET, FILM COATED ORAL at 17:05

## 2021-12-18 RX ADMIN — Medication 5 ML: at 22:01

## 2021-12-18 RX ADMIN — MEMANTINE HYDROCHLORIDE 10 MG: 5 TABLET ORAL at 08:28

## 2021-12-18 RX ADMIN — SODIUM CHLORIDE, SODIUM LACTATE, POTASSIUM CHLORIDE, AND CALCIUM CHLORIDE 75 ML/HR: 600; 310; 30; 20 INJECTION, SOLUTION INTRAVENOUS at 05:18

## 2021-12-18 RX ADMIN — Medication 10 ML: at 17:22

## 2021-12-18 RX ADMIN — CYANOCOBALAMIN TAB 1000 MCG 1000 MCG: 1000 TAB at 08:28

## 2021-12-18 RX ADMIN — MEMANTINE HYDROCHLORIDE 10 MG: 5 TABLET ORAL at 17:05

## 2021-12-18 RX ADMIN — METOPROLOL TARTRATE 25 MG: 25 TABLET, FILM COATED ORAL at 08:28

## 2021-12-18 RX ADMIN — DIVALPROEX SODIUM 125 MG: 125 CAPSULE ORAL at 08:28

## 2021-12-18 RX ADMIN — ACETAMINOPHEN 650 MG: 325 TABLET ORAL at 05:18

## 2021-12-18 RX ADMIN — CALCIUM CARBONATE 500 MG (1,250 MG)-VITAMIN D3 200 UNIT TABLET 1 TABLET: at 08:28

## 2021-12-18 RX ADMIN — MORPHINE SULFATE 2 MG: 2 INJECTION, SOLUTION INTRAMUSCULAR; INTRAVENOUS at 17:20

## 2021-12-18 RX ADMIN — DIVALPROEX SODIUM 125 MG: 125 CAPSULE ORAL at 17:05

## 2021-12-18 RX ADMIN — ACETAMINOPHEN 650 MG: 325 TABLET ORAL at 12:20

## 2021-12-18 RX ADMIN — Medication 5 ML: at 05:18

## 2021-12-18 NOTE — PROGRESS NOTES
Hospitalist Progress Note   Admit Date:  12/15/2021  3:57 PM   Name:  Maritza Webb   Age:  80 y.o. Sex:  female  :  1940   MRN:  [id number]   Room:  [facility]/    Presenting Complaint: Fall    Reason(s) for Admission: Closed right hip fracture [facility]) [place] [facility] Course & Interval History:   Maritza Webb is a 80 y.o. female with medical history of severe dementia who presented from [place] [address] in memory unit after SNF s/p fall hitting her head associated with R leg pain. Unclear what happened but per EMS, patient fell backward and struck her head but did not have LOC. At bedside, patient was pleasantly demented, alert but not oriented to person, place or time. Stated that she was \"doing okay. \"    In ED, VSS. CT head and CT C-spine were unremarkable. CXR unremarkable. Hip x-ray shows comminuted intertrochanteric fracture of the proximal right femur. Labs on admission only notable for K3.0. Pt was admitted for further medical mgmt with ortho consult requested. Subjective (21): \"No pain. \" Severely demented 81y.o. WF, minimally verbal, sitting up in bed without complaints    Unable to ROS with patient due to severe dementia    Assessment & Plan:     Principal Problem:    Closed right hip fracture ([address] (12/15/2021)  - POD#1, s/p right gamma nail insertion  - denies pain on exam  - needs STR on dc    Active Problems:    Hypokalemia  - resolved; K+ 3.7 this AM      Uncontrolled HTN  - cont lisinopril / metoprolol (newly started)  - acceptable control      UTI  - 2021 urine cx mixed tariq  - transition to vantin for empiric tx total 7-day course (EOT 2021)      ABLA (post surgery)  - hgb drop 13.3 ~> 9.8 ~> 8.7 this AM  - closely monitor, transfuse if Hgb <7.0  - no gross bleeding      Severe dementia  - cont apricept / namenda      Debility  - HIGH RISK FOR RECURRENT FALLS  - PT/OT eval pending        Dispo/Discharge Planning:    - [facility] Monday    Diet: ADULT DIET Regular  DVT PPx: ASA 325mg daily  Code status: Partial Code    Hospital Problems as of 12/18/2021 Never Reviewed          Codes Class Noted - Resolved POA    * (Principal) Closed right hip fracture ([facility] [address] ICD-10-CM: S72.001A  ICD-9-CM: 820.8  12/15/2021 - Present Yes        Psychiatric disorder ICD-10-CM: J73  ICD-9-CM: 298.9  Unknown - Present Unknown              Objective:     Patient Vitals for the past 24 hrs:   Temp Pulse Resp BP SpO2   12/18/21 1051 98.5 °F (36.9 °C) 69 20 121/67 94 %   12/18/21 0723 98.2 °F (36.8 °C) 65 16 128/76 99 %   12/18/21 0423 97.6 °F (36.4 °C) -- 12 131/71 96 %   12/18/21 0145 -- -- -- -- 96 %   12/17/21 2331 98.6 °F (37 °C) 70 14 103/60 92 %   12/17/21 1944 98.5 °F (36.9 °C) 66 14 117/71 97 %   12/17/21 1515 97.3 °F (36.3 °C) 67 16 (!) 146/90 95 %   12/17/21 1310 98.7 °F (37.1 °C) 87 16 (!) 161/93 94 %     Oxygen Therapy  O2 Sat (%): 94 % (12/18/21 1051)  Pulse via Oximetry: 66 beats per minute (12/17/21 1944)  O2 Device: None (Room air) (12/18/21 0145)  FIO2 (%): 21 % (12/18/21 0145)    There is no height or weight on file to calculate BMI. Intake/Output Summary (Last 24 hours) at 12/18/2021 1218  Last data filed at 12/18/2021 0640  Gross per 24 hour   Intake 360 ml   Output 350 ml   Net 10 ml         Physical Exam:     Blood pressure 121/67, pulse 69, temperature 98.5 °F (36.9 °C), resp. rate 20, SpO2 94 %. General:    Sleepy female sitting up in bed, no overt distress  Head:  Normocephalic, atraumatic  Eyes:  Sclerae appear normal.  Pupils equally round. ENT:  Nares appear normal, no drainage. Dry oral mucosa  Neck:  Trachea grossly midline, neck non-tender to manipulation  CV:   RRR. No m/r/g. No jugular venous distension. Lungs:   Decreased breath sounds, no wheezing, rhonchi, or rales. Respirations even, unlabored  Abdomen: Bowel sounds present. Soft, nontender, nondistended.   Extremities: No tenderness to palpation RLE; bandage clean no gross bleeding  Skin:     No rashes and normal coloration. Warm and dry.     Neuro:  sleepy Ox1; no focal deficits  Psych:  Affect flat    I have reviewed ordered lab tests and independently visualized imaging below:    Recent Labs:  Recent Results (from the past 48 hour(s))   METABOLIC PANEL, BASIC    Collection Time: 12/16/21  3:58 PM   Result Value Ref Range    Sodium 147 (H) 136 - 145 mmol/L    Potassium 3.5 3.5 - 5.1 mmol/L    Chloride 114 (H) 98 - 107 mmol/L    CO2 23 21 - 32 mmol/L    Anion gap 10 7 - 16 mmol/L    Glucose 97 65 - 100 mg/dL    BUN 21 8 - 23 MG/DL    Creatinine 0.51 (L) 0.6 - 1.0 MG/DL    GFR est AA >60 >60 ml/min/1.73m2    GFR est non-AA >60 >60 ml/min/1.73m2    Calcium 9.1 8.3 - 10.4 MG/DL   PROTHROMBIN TIME + INR    Collection Time: 12/16/21  5:20 PM   Result Value Ref Range    Prothrombin time 15.0 (H) 12.6 - 14.5 sec    INR 1.2     PTT    Collection Time: 12/16/21  5:20 PM   Result Value Ref Range    aPTT 33.6 24.1 - 35.1 SEC   PLEASE READ & DOCUMENT PPD TEST IN 24 HRS    Collection Time: 12/17/21  9:33 AM   Result Value Ref Range    PPD Negative Negative    mm Induration 0 0 - 5 mm   METABOLIC PANEL, BASIC    Collection Time: 12/17/21  5:42 PM   Result Value Ref Range    Sodium 147 (H) 136 - 145 mmol/L    Potassium 3.5 3.5 - 5.1 mmol/L    Chloride 114 (H) 98 - 107 mmol/L    CO2 29 21 - 32 mmol/L    Anion gap 4 (L) 7 - 16 mmol/L    Glucose 126 (H) 65 - 100 mg/dL    BUN 25 (H) 8 - 23 MG/DL    Creatinine 0.59 (L) 0.6 - 1.0 MG/DL    GFR est AA >60 >60 ml/min/1.73m2    GFR est non-AA >60 >60 ml/min/1.73m2    Calcium 9.3 8.3 - 10.4 MG/DL   CBC W/O DIFF    Collection Time: 12/17/21  5:42 PM   Result Value Ref Range    WBC 9.0 4.3 - 11.1 K/uL    RBC 3.26 (L) 4.05 - 5.2 M/uL    HGB 9.8 (L) 11.7 - 15.4 g/dL    HCT 30.5 (L) 35.8 - 46.3 %    MCV 93.6 79.6 - 97.8 FL    MCH 30.1 26.1 - 32.9 PG    MCHC 32.1 31.4 - 35.0 g/dL    RDW 13.1 11.9 - 14.6 %    PLATELET 742 (L) 258 - 450 K/uL    MPV 10.1 9.4 - 12.3 FL    ABSOLUTE NRBC 0.00 0.0 - 0.2 K/uL   METABOLIC PANEL, BASIC    Collection Time: 12/18/21  5:44 AM   Result Value Ref Range    Sodium 149 (H) 136 - 145 mmol/L    Potassium 3.7 3.5 - 5.1 mmol/L    Chloride 114 (H) 98 - 107 mmol/L    CO2 30 21 - 32 mmol/L    Anion gap 5 (L) 7 - 16 mmol/L    Glucose 98 65 - 100 mg/dL    BUN 27 (H) 8 - 23 MG/DL    Creatinine 0.67 0.6 - 1.0 MG/DL    GFR est AA >60 >60 ml/min/1.73m2    GFR est non-AA >60 >60 ml/min/1.73m2    Calcium 9.4 8.3 - 10.4 MG/DL   CBC WITH AUTOMATED DIFF    Collection Time: 12/18/21  5:44 AM   Result Value Ref Range    WBC 7.2 4.3 - 11.1 K/uL    RBC 2.99 (L) 4.05 - 5.2 M/uL    HGB 8.7 (L) 11.7 - 15.4 g/dL    HCT 27.3 (L) 35.8 - 46.3 %    MCV 91.3 79.6 - 97.8 FL    MCH 29.1 26.1 - 32.9 PG    MCHC 31.9 31.4 - 35.0 g/dL    RDW 13.1 11.9 - 14.6 %    PLATELET 517 (L) 507 - 450 K/uL    MPV 10.1 9.4 - 12.3 FL    ABSOLUTE NRBC 0.00 0.0 - 0.2 K/uL    DF AUTOMATED      NEUTROPHILS 77 43 - 78 %    LYMPHOCYTES 12 (L) 13 - 44 %    MONOCYTES 9 4.0 - 12.0 %    EOSINOPHILS 0 (L) 0.5 - 7.8 %    BASOPHILS 0 0.0 - 2.0 %    IMMATURE GRANULOCYTES 2 0.0 - 5.0 %    ABS. NEUTROPHILS 5.6 1.7 - 8.2 K/UL    ABS. LYMPHOCYTES 0.8 0.5 - 4.6 K/UL    ABS. MONOCYTES 0.7 0.1 - 1.3 K/UL    ABS. EOSINOPHILS 0.0 0.0 - 0.8 K/UL    ABS. BASOPHILS 0.0 0.0 - 0.2 K/UL    ABS. IMM. GRANS. 0.2 0.0 - 0.5 K/UL   PLEASE READ & DOCUMENT PPD TEST IN 48 HRS    Collection Time: 12/18/21  9:33 AM   Result Value Ref Range    PPD Negative Negative    mm Induration 0 0 - 5 mm       All Micro Results     Procedure Component Value Units Date/Time    CULTURE, URINE [[id number]] Collected: 12/16/21 0534    Order Status: Completed Specimen: Cath Urine Updated: 12/18/21 0934     Special Requests: NO SPECIAL REQUESTS        Culture result:       >100,000 COLONIES/mL MIXED SKIN AL ISOLATED                  THREE OR MORE TYPES OF ORGANISMS ARE PRESENT.   THIS IS INDICATIVE OF CONTAMINATION DUE TO IMPROPER COLLECTION TECHNIQUE. PLEASE REPEAT COLLECTION UNLESS PATIENT HAS STARTED ANTIBIOTIC TREATMENT. MSSA/MRSA SC BY PCR, NASAL SWAB [[id number]] Collected: 12/16/21 0446    Order Status: Completed Specimen: Nasal swab Updated: 12/16/21 0706     Special Requests: NO SPECIAL REQUESTS        Culture result:       SA target not detected. A MRSA NEGATIVE, SA NEGATIVE test result does not preclude MRSA or SA nasal colonization. COVID-19 RAPID TEST [[id number]] Collected: 12/16/21 0229    Order Status: Completed Specimen: Nasopharyngeal Updated: 12/16/21 0309     Specimen source NOT SPECIFIED        COVID-19 rapid test Not detected        Comment:      The specimen is NEGATIVE for SARS-CoV-2, the novel coronavirus associated with COVID-19. A negative result does not rule out COVID-19. This test has been authorized by the FDA under an Emergency Use Authorization (EUA) for use by authorized laboratories. Fact sheet for Healthcare Providers: [employer] Wireless[email or web address]  Fact sheet for Patients: [employer]       Methodology: Isothermal Nucleic Acid Amplification               Other Studies:  No results found.     Current Meds:  Current Facility-Administered Medications   Medication Dose Route Frequency    cefpodoxime (VANTIN) tablet 200 mg  200 mg Oral Q12H    lactated Ringers infusion  50 mL/hr IntraVENous CONTINUOUS    lidocaine (XYLOCAINE) 10 mg/mL (1 %) injection 0.1 mL  0.1 mL SubCUTAneous PRN    lip protectant (BLISTEX) ointment 1 Each  1 Each Topical PRN    morphine injection 2 mg  2 mg IntraVENous Q1H PRN    sodium chloride (NS) flush 5-40 mL  5-40 mL IntraVENous Q8H    sodium chloride (NS) flush 5-40 mL  5-40 mL IntraVENous PRN    alum-mag hydroxide-simeth (MYLANTA) oral suspension 30 mL  30 mL Oral Q4H PRN    calcium-vitamin D (OS-FELICE +D3) 500 mg-200 unit per tablet 1 Tablet  1 Tablet Oral TID WITH MEALS    aspirin delayed-release tablet 325 mg  325 mg Oral DAILY    metoprolol tartrate (LOPRESSOR) tablet 25 mg  25 mg Oral Q12H    sodium chloride (NS) flush 5-40 mL  5-40 mL IntraVENous Q8H    sodium chloride (NS) flush 5-40 mL  5-40 mL IntraVENous PRN    acetaminophen (TYLENOL) tablet 650 mg  650 mg Oral Q8H    oxyCODONE IR (ROXICODONE) tablet 5 mg  5 mg Oral Q4H PRN    lisinopriL (PRINIVIL, ZESTRIL) tablet 40 mg  40 mg Oral DAILY    cyanocobalamin tablet 1,000 mcg  1,000 mcg Oral DAILY    divalproex (DEPAKOTE SPRINKLE) capsule 125 mg  125 mg Oral BID    donepeziL (ARICEPT) tablet 10 mg  10 mg Oral QHS    memantine (NAMENDA) tablet 10 mg  10 mg Oral BID       Signed:  REGGIE Portillo    Part of this note may have been written by using a voice dictation software. The note has been proof read but may still contain some grammatical/other typographical errors.

## 2021-12-18 NOTE — PROGRESS NOTES
ACUTE PHYSICAL THERAPY GOALS:  (Developed with and agreed upon by patient and/or caregiver. )  LTG:  (1.)Ms. Georges Garcia will move from supine to sit and sit to supine , scoot up and down and roll side to side in bed with CONTACT GUARD ASSIST within 7 treatment day(s). (2.)Ms. Georges Garcia will transfer from bed to chair and chair to bed with CONTACT GUARD ASSIST using the least restrictive device within 7 treatment day(s). (3.)Ms. Georges Garcia will ambulate with CONTACT GUARD ASSIST for 100+ feet with the least restrictive device within 7 treatment day(s).   ________________________________________________________________________________________________       PHYSICAL THERAPY ASSESSMENT: Initial Assessment and AM PT Treatment Day # 1     WBAT JF Domingo is a 80 y.o. female   PRIMARY DIAGNOSIS: Closed right hip fracture (HCC)  Closed right hip fracture ([address] [S72.001A]  Procedure(s) (LRB):  RIGHT GAMMA NAIL INSERTION (Right)  1 Day Post-Op  Reason for Referral:    ICD-10: Treatment Diagnosis: Generalized Muscle Weakness (M62.81)  Difficulty in walking, Not elsewhere classified (R26.2)  [place] / [place] / Product Type: Medicare /     ASSESSMENT:     REHAB RECOMMENDATIONS:   Recommendation to date pending progress:  Setting:   Short-term Rehab   or back to memory care facility  Equipment:    To Be Determined     PRIOR LEVEL OF FUNCTION:  (Prior to Hospitalization) INITIAL/CURRENT LEVEL OF FUNCTION:  (Most Recently Demonstrated)   Bed Mobility:   Unknown  Sit to Stand:   Unknown  Transfers:   Unknown  Gait/Mobility:   Unknown Bed Mobility:   Minimal Assistance x 2  Sit to Stand:   Minimal Assistance x 2 to MOD A x 2  Transfers:   Moderate Assistance x 2 to MAX A x 2  Gait/Mobility:   Maximal Assistance x 2     ASSESSMENT:  Ms. Georges Garcia presents with decreased bed mobility, transfers, ambulation, balance, activity tolerance, and overall general functional mobility after hospital admission on 12/15/21 with fall and hip fx, now s/p surgery 12/17/21, WBAT R LE. Pt with severe dementia, lives in memory care facility, unsure baseline activity. Pt pulled out IV this am, constant rambling, difficult to determine what assist pt needs or understands. Pt moving B LEs well in supine, has R LE flexed in bed. Pt seated to EOB with MIN A x 2, attempted sit to stand with RW for support, pt unsafe with use of RW, R knee buckling with steppage attempt, pt yelling. Pt returned to sitting, assist with SPT with close contact x 2 person assist mod/max A x 2 for sit to stand and SPT to chair. Pt with limited weight bearing on R LE with transfer. Pt positioned for comfort, LEs elevated, alarm set, tray in front. Pt appears calmer and comfortable in chair. PT to follow for acute care needs to address deficits noted above.       SUBJECTIVE:   Ms. Geovany Medrano yelling and rambling, difficult to understand or to calm    SOCIAL HISTORY/LIVING ENVIRONMENT: from memory care, unsure of baseline level of mobility  Home Environment: Skilled nursing facility ([facility] care)  One/Two Story Residence: One story  Support Systems: Assisted Living,Child(carlos eduardo)  OBJECTIVE:     PAIN: VITAL SIGNS: LINES/DRAINS:   Pre Treatment: Pain Screen  Pain Scale 1: FLACC  Pain Intensity 1: 0 (at rest, c/o pain with standing)  Post Treatment: appears comfortable in chair Vital Signs  O2 Device: None (Room air) none  O2 Device: None (Room air)     GROSS EVALUATION:  B LE Within Functional Limits Abnormal/ Functional Abnormal/ Non-Functional (see comments) Not Tested Comments:   AROM [] [x] [] [] R LE   PROM [x] [] [] []    Strength [] [x] [] [] R LE   Balance [] [x] [] []    Posture [] [x] [] []    Sensation [] [] [] [] Not assessed   Coordination [] [x] [] []    Tone [x] [] [] []    Edema [] [x] [] []    Activity Tolerance [] [x] [] []     [] [] [] []      COGNITION/  PERCEPTION: Intact Impaired   (see comments) Comments:   Orientation [] [x] Severe dementia   Vision [x] []    Hearing [x] []    Command Following [] [x]    Safety Awareness [] [x]     [] []      MOBILITY: I Mod I S SBA CGA Min Mod Max Total  NT x2 Comments:   Bed Mobility    Rolling [] [] [] [] [] [] [] [] [] [x] []    Supine to Sit [] [] [] [] [] [x] [] [] [] [] [x]    Scooting [] [] [] [] [] [x] [] [] [] [] [x]    Sit to Supine [] [] [] [] [] [] [] [] [] [x] [] chair   Transfers    Sit to Stand [] [] [] [] [] [] [x] [] [] [] [x] Bed elevated   Bed to Chair [] [] [] [] [] [] [] [x] [] [] [x]    Stand to Sit [] [] [] [] [] [] [] [x] [] [] [x]    I=Independent, Mod I=Modified Independent, S=Supervision, SBA=Standby Assistance, CGA=Contact Guard Assistance,   Min=Minimal Assistance, Mod=Moderate Assistance, Max=Maximal Assistance, Total=Total Assistance, NT=Not Tested  GAIT: I Mod I S SBA CGA Min Mod Max Total  NT x2 Comments:   Level of Assistance [] [] [] [] [] [] [] [x] [] [] [x]    Distance 1 (SPT to chair)    DME Gait Belt    Gait Quality Limited weight bearing    Weightbearing Status WBAT     I=Independent, Mod I=Modified Independent, S=Supervision, SBA=Standby Assistance, CGA=Contact Guard Assistance,   Min=Minimal Assistance, Mod=Moderate Assistance, Max=Maximal Assistance, Total=Total Assistance, NT=Not Tested    [address] [facility] [address] [address] [facility] [address] [facility] Mobility Inpatient Short Form       How much difficulty does the patient currently have. .. Unable A Lot A Little None   1. Turning over in bed (including adjusting bedclothes, sheets and blankets)? [] 1   [x] 2   [] 3   [] 4   2. Sitting down on and standing up from a chair with arms ( e.g., wheelchair, bedside commode, etc.)   [] 1   [x] 2   [] 3   [] 4   3. Moving from lying on back to sitting on the side of the bed? [] 1   [x] 2   [] 3   [] 4   How much help from another person does the patient currently need. .. Total A Lot A Little None   4. Moving to and from a bed to a chair (including a wheelchair)?    [] 1 [x] 2   [] 3   [] 4   5. Need to walk in hospital room? [] 1   [x] 2   [] 3   [] 4   6. Climbing 3-5 steps with a railing? [x] 1   [] 2   [] 3   [] 4   © 2007, Trustees of [facility], under license to [employer]. All rights reserved     Score:  Initial: 11 Most Recent: X (Date: -- )    Interpretation of Tool:  Represents activities that are increasingly more difficult (i.e. Bed mobility, Transfers, Gait). PLAN:   FREQUENCY/DURATION: PT Plan of Care: BID for duration of hospital stay or until stated goals are met, whichever comes first.    PROBLEM LIST:   (Skilled intervention is medically necessary to address:)  1. Decreased ADL/Functional Activities  2. Decreased Activity Tolerance  3. Decreased AROM/PROM  4. Decreased Balance  5. Decreased Cognition  6. Decreased Coordination  7. Decreased Gait Ability  8. Decreased Strength  9. Decreased Transfer Abilities   INTERVENTIONS PLANNED:   (Benefits and precautions of physical therapy have been discussed with the patient.)  1. Therapeutic Activity  2. Therapeutic Exercise/HEP  3. Neuromuscular Re-education  4. Gait Training  5. Manual Therapy  6. Education     TREATMENT:     EVALUATION: High Complexity : (Untimed Charge)    TREATMENT:   ($$ Therapeutic Activity: 8-22 mins    )  Therapeutic Activity (10 Minutes): Therapeutic activity included Supine to Sit, Scooting, Transfer Training, Ambulation on level ground, Sitting balance , Standing balance and weight shifting to improve functional Mobility, Strength, Activity tolerance and balance.    Co-Treatment PT/OT necessary due to patient's decreased overall endurance/tolerance levels, as well as need for high level skilled assistance to complete functional transfers/mobility and functional tasks     TREATMENT GRID:  N/A    AFTER TREATMENT POSITION/PRECAUTIONS:  Alarm Activated, Chair, Needs within reach and RN notified    INTERDISCIPLINARY COLLABORATION:  RN/PCT, PT/PTA and OT/WYATT    TOTAL TREATMENT DURATION:  PT Patient Time In/Time Out  Time In: 1049  Time Out: [address], PT

## 2021-12-18 NOTE — PROGRESS NOTES
Orthopaedic Surgery Progress Note     Molly Blas   female   1940     Patient Active Problem List    Diagnosis Date Noted    Closed right hip fracture ([address] 12/15/2021    Psychiatric disorder        Procedure: 1 Day Post-Op Procedure(s):  RIGHT GAMMA NAIL INSERTION   Attending Surgeon: Tito Xiong MD     Subjective:   Pain well controlled, no acute distress. Objective:   Afebrile, vital signs stable.    Visit Vitals  /76 (BP 1 Location: Left upper arm, BP Patient Position: Supine)   Pulse 65   Temp 98.2 °F (36.8 °C)   Resp 16   SpO2 99%        GENERAL:    Alert, oriented x 0, appears in no distress     EXTREMITIES:       Right Lower Extremity  Dressing c/d/i   Intact EHL/FHL/TA/GS   Sensation intact to light touch DP/SP   2+ DP pulse   Toes WWP  No pedal edema          CBC:    Lab Results   Component Value Date/Time    WBC 7.2 12/18/2021 05:44 AM    RBC 2.99 (L) 12/18/2021 05:44 AM    HGB 8.7 (L) 12/18/2021 05:44 AM    HCT 27.3 (L) 12/18/2021 05:44 AM     (L) 12/18/2021 05:44 AM      BMP:   Lab Results   Component Value Date/Time     (H) 12/18/2021 05:44 AM    K 3.7 12/18/2021 05:44 AM     (H) 12/18/2021 05:44 AM    CO2 30 12/18/2021 05:44 AM    BUN 27 (H) 12/18/2021 05:44 AM    GLU 98 12/18/2021 05:44 AM      CMP:   Lab Results   Component Value Date/Time     (H) 12/18/2021 05:44 AM    K 3.7 12/18/2021 05:44 AM     (H) 12/18/2021 05:44 AM    CO2 30 12/18/2021 05:44 AM    BUN 27 (H) 12/18/2021 05:44 AM    GLU 98 12/18/2021 05:44 AM        Assessment/Plan     80 y.o. female with the following musculoskeletal problems:     1 Day Post-Op Procedure(s):  RIGHT GAMMA NAIL INSERTION           Weightbearing status   WBAT RLE   DVT prophylaxis    mg PO qd   Dressing changes   daily starting POD 2   Drain status   n/a   Physical/Occupational Therapy   OOB, ambulate WBAT with PT   Pain control   continue current regimen   Disposition   likely to SNF      Latoya PASCUAL Merline Correia MD  12/18/21  10:08 AM    [address] [facility] [facility]  Phone: [phone]

## 2021-12-18 NOTE — PROGRESS NOTES
Reviewed notes for spiritual concerns past 24 hours:    Per notes:      Partial code    Has directives on file    Exp d/c  2  Days    Psych disorder    Hip FX      D/C  to [facility]        Additional information:      Daughter -  Jose Alberto Rom -  [address] [facility] locally                    Will continue to assess how we can best serve this family

## 2021-12-18 NOTE — PROGRESS NOTES
Problem: Falls - Risk of  Goal: *Absence of Falls  Description: Document Pooja Lewis Fall Risk and appropriate interventions in the flowsheet.   Outcome: Progressing Towards Goal  Note: Fall Risk Interventions:  Mobility Interventions: Bed/chair exit alarm,OT consult for ADLs,Patient to call before getting OOB,PT Consult for mobility concerns    Mentation Interventions: Adequate sleep, hydration, pain control,Bed/chair exit alarm,Door open when patient unattended,Increase mobility,More frequent rounding,Reorient patient    Medication Interventions: Bed/chair exit alarm,Evaluate medications/consider consulting pharmacy,Patient to call before getting OOB,Teach patient to arise slowly    Elimination Interventions: Bed/chair exit alarm,Call light in reach,Patient to call for help with toileting needs,Stay With Me (per policy)    History of Falls Interventions: Bed/chair exit alarm,Door open when patient unattended,Investigate reason for fall         Problem: Patient Education: Go to Patient Education Activity  Goal: Patient/Family Education  Outcome: Progressing Towards Goal     Problem: Patient Education: Go to Patient Education Activity  Goal: Patient/Family Education  Outcome: Progressing Towards Goal     Problem: Hip Fracture: Day of Surgery Post-op Care  Goal: Activity/Safety  Outcome: Progressing Towards Goal  Goal: Consults, if ordered  Outcome: Progressing Towards Goal  Goal: Diagnostic Test/Procedures  Outcome: Progressing Towards Goal  Goal: Nutrition/Diet  Outcome: Progressing Towards Goal  Goal: Medications  Outcome: Progressing Towards Goal  Goal: Respiratory  Outcome: Progressing Towards Goal  Goal: Treatments/Interventions/Procedures  Outcome: Progressing Towards Goal  Goal: Psychosocial  Outcome: Progressing Towards Goal  Goal: *Absence of skin breakdown  Outcome: Progressing Towards Goal  Goal: *Optimal pain control at patient's stated goal  Outcome: Progressing Towards Goal  Goal: *Hemodynamically stable  Outcome: Progressing Towards Goal     Problem: Pressure Injury - Risk of  Goal: *Prevention of pressure injury  Description: Document Rodney Scale and appropriate interventions in the flowsheet.   Outcome: Progressing Towards Goal  Note: Pressure Injury Interventions:  Sensory Interventions: Assess changes in LOC,Assess need for specialty bed,Check visual cues for pain,Discuss PT/OT consult with provider,Keep linens dry and wrinkle-free,Pad between skin to skin,Pressure redistribution bed/mattress (bed type)    Moisture Interventions: Absorbent underpads,Apply protective barrier, creams and emollients,Check for incontinence Q2 hours and as needed,Limit adult briefs,Internal/External urinary devices    Activity Interventions: Increase time out of bed,Pressure redistribution bed/mattress(bed type),PT/OT evaluation    Mobility Interventions: HOB 30 degrees or less,Pressure redistribution bed/mattress (bed type),PT/OT evaluation    Nutrition Interventions: Document food/fluid/supplement intake,Offer support with meals,snacks and hydration    Friction and Shear Interventions: Apply protective barrier, creams and emollients,Foam dressings/transparent film/skin sealants                Problem: Patient Education: Go to Patient Education Activity  Goal: Patient/Family Education  Outcome: Progressing Towards Goal

## 2021-12-18 NOTE — PROGRESS NOTES
ACUTE OT GOALS:  (Developed with and agreed upon by patient and/or caregiver.)  1. Pt will toilet with min A   2. Pt will complete functional transfers for ADLs with min A using AD as needed  3. Pt will complete upper body bathing and dressing with min A   4. Pt will complete grooming and hygiene with set up  5. Pt will complete bed mobility with SBA in prep for ADLs    Timeframe: 7 days      OCCUPATIONAL THERAPY ASSESSMENT: Initial Assessment and Daily Note OT Treatment Day # 1    Minor Courts is a 80 y.o. female   PRIMARY DIAGNOSIS: Closed right hip fracture (HCC)  Closed right hip fracture ([facility] [address] [S72.001A]  Procedure(s) (LRB):  RIGHT GAMMA NAIL INSERTION (Right)  1 Day Post-Op  Reason for Referral:  L hip fracture  ICD-10: Treatment Diagnosis: History of falling (Z91.81)  [place] / Product Type: Medicare /   ASSESSMENT:     REHAB RECOMMENDATIONS:   Recommendation to date pending progress:  Setting:   Short-term Rehab  Equipment:    None     PRIOR LEVEL OF FUNCTION:  (Prior to Hospitalization)  INITIAL/CURRENT LEVEL OF FUNCTION:  (Based on today's evaluation)   Bathing:   Unknown  Dressing:   Unknown  Feeding/Grooming:   Unknown  Toileting:   Unknown  Functional Mobility:   Unknown Bathing:   Maximal Assistance  Dressing:   Maximal Assistance  Feeding/Grooming:   Minimal Assistance  Toileting:   Maximal Assistance  Functional Mobility:   Minimal Assistance x 2     ASSESSMENT:  Ms. Arielle So was admitted w/ a R hip fracture d/t falling, post op ORIF, WBAT. Pt resides at [facility], unable to provide home or PLOF information d/t dementia. Pt presented with deficits in cognition, mobility, and balance impacting ADLs. Pt very confused and A&O to self only and had difficulty following commands and participating in functional tasks d/t cognitive limitations. Pt required total A for LB dressing, mod A to change gown. Min-mod X2 for SPT to chair.  Pt is below her functional baseline and would benefit from skilled OT services to address deficits. SUBJECTIVE:   Ms. Shey Carter states, \"Momma. \"    SOCIAL HISTORY/LIVING ENVIRONMENT: Pt resides at [facility], unable to provide home or PLOF information d/t dementia.    Support Systems: Assisted Living,Child(carlos eduardo)    OBJECTIVE:     PAIN: VITAL SIGNS: LINES/DRAINS:   Pre Treatment: Pain Screen  Pain Scale 1: FLACC  Pain Intensity 1: 0  Post Treatment: 7 (pain w/ mobility, relieved at rest)   O2 Device: None (Room air)     GROSS EVALUATION:  BUE Within Functional Limits Abnormal/ Functional Abnormal/ Non-Functional (see comments) Not Tested Comments:   AROM [x] [] [] []    PROM [] [] [] []    Strength [] [x] [] []    Balance [] [x] [] []    Posture [] [] [] []    Sensation [] [] [] []    Coordination [x] [] [] []    Tone [] [] [] []    Edema [] [] [] []    Activity Tolerance [] [x] [] []     [] [] [] []      COGNITION/  PERCEPTION: Intact Impaired   (see comments) Comments:   Orientation [] [x]    Vision [] []    Hearing [] []    Judgment/ Insight [] [x]    Attention [] [x]    Memory [] [x]    Command Following [] [x]    Emotional Regulation [] []     [] []      ACTIVITIES OF DAILY LIVING: I Mod I S SBA CGA Min Mod Max Total NT Comments   BASIC ADLs:              Bathing/ Showering [] [] [] [] [] [] [] [] [] []    Toileting [] [] [] [] [] [] [] [] [] []    Dressing [] [] [] [] [] [] [] [x] [] [] Mod UB, total LB   Feeding [] [] [] [] [] [x] [] [] [] []    Grooming [] [] [] [] [] [x] [] [] [] []    Personal Device Care [] [] [] [] [] [] [] [] [] []    Functional Mobility [] [] [] [] [] [] [] [] [] []    I=Independent, Mod I=Modified Independent, S=Supervision, SBA=Standby Assistance, CGA=Contact Guard Assistance,   Min=Minimal Assistance, Mod=Moderate Assistance, Max=Maximal Assistance, Total=Total Assistance, NT=Not Tested    MOBILITY: I Mod I S SBA CGA Min Mod Max Total  NT x2 Comments:   Supine to sit [] [] [] [] [] [] [x] [] [] [] [x]    Sit to supine [] [] [] [] [] [] [] [] [] [] []    Sit to stand [] [] [] [] [] [] [x] [] [] [] [x]    Bed to chair [] [] [] [] [] [] [x] [] [] [] [x]    I=Independent, Mod I=Modified Independent, S=Supervision, SBA=Standby Assistance, CGA=Contact Guard Assistance,   Min=Minimal Assistance, Mod=Moderate Assistance, Max=Maximal Assistance, Total=Total Assistance, NT=Not Tested    [facility] [address]   Daily Activity Inpatient Short Form        How much help from another person does the patient currently need. .. Total A Lot A Little None   1. Putting on and taking off regular lower body clothing? [x] 1   [] 2   [] 3   [] 4   2. Bathing (including washing, rinsing, drying)? [] 1   [x] 2   [] 3   [] 4   3. Toileting, which includes using toilet, bedpan or urinal?   [] 1   [x] 2   [] 3   [] 4   4. Putting on and taking off regular upper body clothing? [] 1   [x] 2   [] 3   [] 4   5. Taking care of personal grooming such as brushing teeth? [] 1   [] 2   [x] 3   [] 4   6. Eating meals? [] 1   [] 2   [x] 3   [] 4   © 2007, Trustees of [facility] [employer], under license to [employer]. All rights reserved     Score:  Initial: 13 Most Recent: X (Date: -- )   Interpretation of Tool:  Represents activities that are increasingly more difficult (i.e. Bed mobility, Transfers, Gait). PLAN:   FREQUENCY/DURATION: OT Plan of Care: 6 times/week for duration of hospital stay or until stated goals are met, whichever comes first.    PROBLEM LIST:   (Skilled intervention is medically necessary to address:)  1. Decreased ADL/Functional Activities  2. Decreased Activity Tolerance  3. Decreased Balance  4. Decreased Cognition  5. Decreased Strength  6. Decreased Transfer Abilities  7. Increased Pain   INTERVENTIONS PLANNED:   (Benefits and precautions of occupational therapy have been discussed with the patient.)  1. Self Care Training  2. Therapeutic Activity  3.  Therapeutic Exercise/HEP  4. Neuromuscular Re-education  5. Education     TREATMENT:     EVALUATION: Moderate Complexity : (Untimed Charge)    TREATMENT:   ($$ Self Care/Home Management: 8-22 mins    )  Co-Treatment PT/OT necessary due to patient's decreased overall endurance/tolerance levels, as well as need for high level skilled assistance to complete functional transfers/mobility and functional tasks  Self Care (10 Minutes): Self care including Upper Body Dressing, Lower Body Dressing, Self Feeding and Grooming to increase independence.     TREATMENT GRID:  N/A    AFTER TREATMENT POSITION/PRECAUTIONS:  Alarm Activated, Chair, Needs within reach and RN notified    INTERDISCIPLINARY COLLABORATION:  RN/PCT and PT/PTA    TOTAL TREATMENT DURATION:  OT Patient Time In/Time Out  Time In: 0641  Time Out: Luis Madsen

## 2021-12-18 NOTE — PROGRESS NOTES
Problem: Falls - Risk of  Goal: *Absence of Falls  Description: Document Pooja Lewis Fall Risk and appropriate interventions in the flowsheet.   Outcome: Progressing Towards Goal  Note: Fall Risk Interventions:  Mobility Interventions: Bed/chair exit alarm,OT consult for ADLs,Patient to call before getting OOB,PT Consult for mobility concerns    Mentation Interventions: Bed/chair exit alarm,Door open when patient unattended    Medication Interventions: Bed/chair exit alarm,Patient to call before getting OOB,Teach patient to arise slowly    Elimination Interventions: Bed/chair exit alarm,Call light in reach,Patient to call for help with toileting needs,Toileting schedule/hourly rounds    History of Falls Interventions: Bed/chair exit alarm,Consult care management for discharge planning,Door open when patient unattended         Problem: Patient Education: Go to Patient Education Activity  Goal: Patient/Family Education  Outcome: Progressing Towards Goal     Problem: Patient Education: Go to Patient Education Activity  Goal: Patient/Family Education  Outcome: Progressing Towards Goal     Problem: Hip Fracture: Day of Surgery Post-op Care  Goal: Off Pathway (Use only if patient is Off Pathway)  Outcome: Progressing Towards Goal  Goal: Activity/Safety  Outcome: Progressing Towards Goal  Goal: Consults, if ordered  Outcome: Progressing Towards Goal  Goal: Diagnostic Test/Procedures  Outcome: Progressing Towards Goal  Goal: Nutrition/Diet  Outcome: Progressing Towards Goal  Goal: Medications  Outcome: Progressing Towards Goal  Goal: Respiratory  Outcome: Progressing Towards Goal  Goal: Treatments/Interventions/Procedures  Outcome: Progressing Towards Goal  Goal: Psychosocial  Outcome: Progressing Towards Goal  Goal: *Absence of skin breakdown  Outcome: Progressing Towards Goal  Goal: *Optimal pain control at patient's stated goal  Outcome: Progressing Towards Goal  Goal: *Hemodynamically stable  Outcome: Progressing Towards Goal     Problem: Hip Fracture: Post-Op Day 1  Goal: Off Pathway (Use only if patient is Off Pathway)  Outcome: Progressing Towards Goal  Goal: Activity/Safety  Outcome: Progressing Towards Goal  Goal: Diagnostic Test/Procedures  Outcome: Progressing Towards Goal  Goal: Nutrition/Diet  Outcome: Progressing Towards Goal  Goal: Medications  Outcome: Progressing Towards Goal  Goal: Respiratory  Outcome: Progressing Towards Goal  Goal: Treatments/Interventions/Procedures  Outcome: Progressing Towards Goal  Goal: Psychosocial  Outcome: Progressing Towards Goal  Goal: Discharge Planning  Outcome: Progressing Towards Goal  Goal: *Demonstrates progressive activity  Outcome: Progressing Towards Goal  Goal: *Optimal pain control at patient's stated goal  Outcome: Progressing Towards Goal  Goal: *Hemodynamically stable  Outcome: Progressing Towards Goal  Goal: *Discharge plan identified  Outcome: Progressing Towards Goal  Goal: *Absence of skin breakdown  Outcome: Progressing Towards Goal     Problem: Hip Fracture: Post-Op Day 2  Goal: Off Pathway (Use only if patient is Off Pathway)  Outcome: Progressing Towards Goal  Goal: Activity/Safety  Outcome: Progressing Towards Goal  Goal: Diagnostic Test/Procedures  Outcome: Progressing Towards Goal  Goal: Nutrition/Diet  Outcome: Progressing Towards Goal  Goal: Medications  Outcome: Progressing Towards Goal  Goal: Respiratory  Outcome: Progressing Towards Goal  Goal: Treatments/Interventions/Procedures  Outcome: Progressing Towards Goal  Goal: Psychosocial  Outcome: Progressing Towards Goal  Goal: Discharge Planning  Outcome: Progressing Towards Goal  Goal: *Optimal pain control at patient's stated goal  Outcome: Progressing Towards Goal  Goal: *Hemodynamically stable  Outcome: Progressing Towards Goal  Goal: *Adequate oxygenation  Outcome: Progressing Towards Goal  Goal: *Tolerates increased activity  Outcome: Progressing Towards Goal  Goal: *Tolerates nutrition therapy  Outcome: Progressing Towards Goal  Goal: *Absence of skin breakdown  Outcome: Progressing Towards Goal     Problem: Hip Fracture: Post-Op Day 3  Goal: Off Pathway (Use only if patient is Off Pathway)  Outcome: Progressing Towards Goal  Goal: Activity/Safety  Outcome: Progressing Towards Goal  Goal: Diagnostic Test/Procedures  Outcome: Progressing Towards Goal  Goal: Nutrition/Diet  Outcome: Progressing Towards Goal  Goal: Medications  Outcome: Progressing Towards Goal  Goal: Respiratory  Outcome: Progressing Towards Goal  Goal: Treatments/Interventions/Procedures  Outcome: Progressing Towards Goal  Goal: Psychosocial  Outcome: Progressing Towards Goal  Goal: Discharge Planning  Outcome: Progressing Towards Goal  Goal: *Met physical therapy criteria for discharge to next level of care  Outcome: Progressing Towards Goal  Goal: *Optimal pain control at patient's stated goal  Outcome: Progressing Towards Goal  Goal: *Hemodynamically stable  Outcome: Progressing Towards Goal  Goal: *Tolerating diet  Outcome: Progressing Towards Goal  Goal: *Active bowel function  Outcome: Progressing Towards Goal  Goal: *Adequate urinary output  Outcome: Progressing Towards Goal  Goal: *Absence of skin breakdown  Outcome: Progressing Towards Goal  Goal: *Patient verbalizes understanding of discharge instructions  Outcome: Progressing Towards Goal     Problem: Hip Fracture: Post-Op Day 4  Goal: Off Pathway (Use only if patient is Off Pathway)  Outcome: Progressing Towards Goal  Goal: Activity/Safety  Outcome: Progressing Towards Goal  Goal: Diagnostic Test/Procedures  Outcome: Progressing Towards Goal  Goal: Nutrition/Diet  Outcome: Progressing Towards Goal  Goal: Medications  Outcome: Progressing Towards Goal  Goal: Respiratory  Outcome: Progressing Towards Goal  Goal: Treatments/Interventions/Procedures  Outcome: Progressing Towards Goal  Goal: Psychosocial  Outcome: Progressing Towards Goal  Goal: Discharge Planning  Outcome: Progressing Towards Goal  Goal: *Met physical therapy criteria for discharge to next level of care  Outcome: Progressing Towards Goal  Goal: *Optimal pain control at patient's stated goal  Outcome: Progressing Towards Goal  Goal: *Hemodynamically stable  Outcome: Progressing Towards Goal  Goal: *Tolerating diet  Outcome: Progressing Towards Goal  Goal: *Active bowel function  Outcome: Progressing Towards Goal  Goal: *Adequate urinary output  Outcome: Progressing Towards Goal  Goal: *Absence of skin breakdown  Outcome: Progressing Towards Goal  Goal: *Patient verbalizes understanding of discharge instructions  Outcome: Progressing Towards Goal

## 2021-12-19 PROBLEM — D62 ACUTE POSTOPERATIVE ANEMIA DUE TO EXPECTED BLOOD LOSS: Status: ACTIVE | Noted: 2021-12-19

## 2021-12-19 PROBLEM — F02.80 EARLY ONSET ALZHEIMER'S DEMENTIA (HCC): Status: ACTIVE | Noted: 2021-12-19

## 2021-12-19 PROBLEM — G30.0 EARLY ONSET ALZHEIMER'S DEMENTIA (HCC): Status: ACTIVE | Noted: 2021-12-19

## 2021-12-19 LAB
ANION GAP SERPL CALC-SCNC: 6 MMOL/L (ref 7–16)
BASOPHILS # BLD: 0 K/UL (ref 0–0.2)
BASOPHILS NFR BLD: 0 % (ref 0–2)
BUN SERPL-MCNC: 27 MG/DL (ref 8–23)
CALCIUM SERPL-MCNC: 8.9 MG/DL (ref 8.3–10.4)
CHLORIDE SERPL-SCNC: 109 MMOL/L (ref 98–107)
CO2 SERPL-SCNC: 29 MMOL/L (ref 21–32)
CREAT SERPL-MCNC: 0.43 MG/DL (ref 0.6–1)
DIFFERENTIAL METHOD BLD: ABNORMAL
EOSINOPHIL # BLD: 0.1 K/UL (ref 0–0.8)
EOSINOPHIL NFR BLD: 2 % (ref 0.5–7.8)
ERYTHROCYTE [DISTWIDTH] IN BLOOD BY AUTOMATED COUNT: 12.8 % (ref 11.9–14.6)
GLUCOSE SERPL-MCNC: 84 MG/DL (ref 65–100)
HCT VFR BLD AUTO: 22.4 % (ref 35.8–46.3)
HGB BLD-MCNC: 7.4 G/DL (ref 11.7–15.4)
HISTORY CHECKED?,CKHIST: NORMAL
IMM GRANULOCYTES # BLD AUTO: 0.1 K/UL (ref 0–0.5)
IMM GRANULOCYTES NFR BLD AUTO: 3 % (ref 0–5)
LYMPHOCYTES # BLD: 0.8 K/UL (ref 0.5–4.6)
LYMPHOCYTES NFR BLD: 16 % (ref 13–44)
MCH RBC QN AUTO: 30.3 PG (ref 26.1–32.9)
MCHC RBC AUTO-ENTMCNC: 33 G/DL (ref 31.4–35)
MCV RBC AUTO: 91.8 FL (ref 79.6–97.8)
MONOCYTES # BLD: 0.4 K/UL (ref 0.1–1.3)
MONOCYTES NFR BLD: 9 % (ref 4–12)
NEUTS SEG # BLD: 3.5 K/UL (ref 1.7–8.2)
NEUTS SEG NFR BLD: 70 % (ref 43–78)
NRBC # BLD: 0 K/UL (ref 0–0.2)
PLATELET # BLD AUTO: 121 K/UL (ref 150–450)
PMV BLD AUTO: 10.6 FL (ref 9.4–12.3)
POTASSIUM SERPL-SCNC: 3.3 MMOL/L (ref 3.5–5.1)
RBC # BLD AUTO: 2.44 M/UL (ref 4.05–5.2)
SODIUM SERPL-SCNC: 144 MMOL/L (ref 136–145)
WBC # BLD AUTO: 5 K/UL (ref 4.3–11.1)

## 2021-12-19 PROCEDURE — 30233N1 TRANSFUSION OF NONAUTOLOGOUS RED BLOOD CELLS INTO PERIPHERAL VEIN, PERCUTANEOUS APPROACH: ICD-10-PCS | Performed by: ORTHOPAEDIC SURGERY

## 2021-12-19 PROCEDURE — 77030019905 HC CATH URETH INTMIT MDII -A

## 2021-12-19 PROCEDURE — 36415 COLL VENOUS BLD VENIPUNCTURE: CPT

## 2021-12-19 PROCEDURE — 65270000029 HC RM PRIVATE

## 2021-12-19 PROCEDURE — 51798 US URINE CAPACITY MEASURE: CPT

## 2021-12-19 PROCEDURE — 74011250637 HC RX REV CODE- 250/637: Performed by: PHYSICIAN ASSISTANT

## 2021-12-19 PROCEDURE — 80048 BASIC METABOLIC PNL TOTAL CA: CPT

## 2021-12-19 PROCEDURE — 74011250636 HC RX REV CODE- 250/636: Performed by: ORTHOPAEDIC SURGERY

## 2021-12-19 PROCEDURE — 2709999900 HC NON-CHARGEABLE SUPPLY

## 2021-12-19 PROCEDURE — 85025 COMPLETE CBC W/AUTO DIFF WBC: CPT

## 2021-12-19 PROCEDURE — 77030040830 HC CATH URETH FOL MDII -A

## 2021-12-19 PROCEDURE — P9016 RBC LEUKOCYTES REDUCED: HCPCS

## 2021-12-19 PROCEDURE — 74011250637 HC RX REV CODE- 250/637: Performed by: ORTHOPAEDIC SURGERY

## 2021-12-19 PROCEDURE — 74011250637 HC RX REV CODE- 250/637: Performed by: HOSPITALIST

## 2021-12-19 PROCEDURE — 36430 TRANSFUSION BLD/BLD COMPNT: CPT

## 2021-12-19 RX ORDER — SODIUM CHLORIDE 9 MG/ML
250 INJECTION, SOLUTION INTRAVENOUS AS NEEDED
Status: DISCONTINUED | OUTPATIENT
Start: 2021-12-19 | End: 2021-12-22 | Stop reason: HOSPADM

## 2021-12-19 RX ORDER — POTASSIUM CHLORIDE 20 MEQ/1
40 TABLET, EXTENDED RELEASE ORAL 2 TIMES DAILY
Status: DISCONTINUED | OUTPATIENT
Start: 2021-12-19 | End: 2021-12-19

## 2021-12-19 RX ORDER — QUETIAPINE FUMARATE 25 MG/1
50 TABLET, FILM COATED ORAL
Status: DISCONTINUED | OUTPATIENT
Start: 2021-12-19 | End: 2021-12-22 | Stop reason: HOSPADM

## 2021-12-19 RX ADMIN — METOPROLOL TARTRATE 25 MG: 25 TABLET, FILM COATED ORAL at 22:11

## 2021-12-19 RX ADMIN — DIVALPROEX SODIUM 125 MG: 125 CAPSULE ORAL at 17:07

## 2021-12-19 RX ADMIN — LISINOPRIL 40 MG: 20 TABLET ORAL at 09:18

## 2021-12-19 RX ADMIN — MORPHINE SULFATE 2 MG: 2 INJECTION, SOLUTION INTRAMUSCULAR; INTRAVENOUS at 22:11

## 2021-12-19 RX ADMIN — ACETAMINOPHEN 650 MG: 325 TABLET ORAL at 05:13

## 2021-12-19 RX ADMIN — CALCIUM CARBONATE 500 MG (1,250 MG)-VITAMIN D3 200 UNIT TABLET 1 TABLET: at 09:18

## 2021-12-19 RX ADMIN — CEFPODOXIME PROXETIL 200 MG: 200 TABLET, FILM COATED ORAL at 09:18

## 2021-12-19 RX ADMIN — CALCIUM CARBONATE 500 MG (1,250 MG)-VITAMIN D3 200 UNIT TABLET 1 TABLET: at 12:42

## 2021-12-19 RX ADMIN — Medication 10 ML: at 22:11

## 2021-12-19 RX ADMIN — QUETIAPINE FUMARATE 50 MG: 25 TABLET ORAL at 22:11

## 2021-12-19 RX ADMIN — METOPROLOL TARTRATE 25 MG: 25 TABLET, FILM COATED ORAL at 09:18

## 2021-12-19 RX ADMIN — Medication 5 ML: at 05:13

## 2021-12-19 RX ADMIN — MEMANTINE HYDROCHLORIDE 10 MG: 5 TABLET ORAL at 09:18

## 2021-12-19 RX ADMIN — DIVALPROEX SODIUM 125 MG: 125 CAPSULE ORAL at 09:18

## 2021-12-19 RX ADMIN — MORPHINE SULFATE 2 MG: 2 INJECTION, SOLUTION INTRAMUSCULAR; INTRAVENOUS at 09:37

## 2021-12-19 RX ADMIN — MORPHINE SULFATE 2 MG: 2 INJECTION, SOLUTION INTRAMUSCULAR; INTRAVENOUS at 07:52

## 2021-12-19 RX ADMIN — ASPIRIN 325 MG: 325 TABLET, COATED ORAL at 09:18

## 2021-12-19 RX ADMIN — CYANOCOBALAMIN TAB 1000 MCG 1000 MCG: 1000 TAB at 09:18

## 2021-12-19 RX ADMIN — CALCIUM CARBONATE 500 MG (1,250 MG)-VITAMIN D3 200 UNIT TABLET 1 TABLET: at 17:07

## 2021-12-19 RX ADMIN — Medication 10 ML: at 14:00

## 2021-12-19 RX ADMIN — MORPHINE SULFATE 2 MG: 2 INJECTION, SOLUTION INTRAMUSCULAR; INTRAVENOUS at 17:07

## 2021-12-19 RX ADMIN — CEFPODOXIME PROXETIL 200 MG: 200 TABLET, FILM COATED ORAL at 22:11

## 2021-12-19 NOTE — PROGRESS NOTES
OT Note:    OT attempted to see patient this morning for therapy. Per RN, patient is to be seen by Urology today. Will hold treatment today and will re-attempt to see patient at a later date/time.     Thanks,  Kirstin Dobson Caro

## 2021-12-19 NOTE — PROGRESS NOTES
ORTH FRACTURE PROGRESS NOTE    2021  Admit Date:   12/15/2021    Post Op day: 2 Days Post-Op    Subjective:     Kathyleen Pel     PT/OT:   Gait:                    Vital Signs:    Patient Vitals for the past 8 hrs:   BP Temp Pulse Resp SpO2   21 0750 131/88 97.6 °F (36.4 °C) (!) 116 18 98 %   21 0340 117/71 97.4 °F (36.3 °C) 70 19 99 %     Temp (24hrs), Av.9 °F (36.6 °C), Min:97.4 °F (36.3 °C), Max:98.5 °F (36.9 °C)      Pain Control:   Pain Assessment  Pain Scale 1: Visual  Pain Intensity 1: 10  Pain Onset 1: post op  Pain Location 1: Leg  Pain Orientation 1: Right  Pain Description 1: Aching  Pain Intervention(s) 1: Medication (see MAR)    Meds:    Current Facility-Administered Medications   Medication Dose Route Frequency    0.9% sodium chloride infusion 250 mL  250 mL IntraVENous PRN    cefpodoxime (VANTIN) tablet 200 mg  200 mg Oral Q12H    lactated Ringers infusion  50 mL/hr IntraVENous CONTINUOUS    lidocaine (XYLOCAINE) 10 mg/mL (1 %) injection 0.1 mL  0.1 mL SubCUTAneous PRN    lip protectant (BLISTEX) ointment 1 Each  1 Each Topical PRN    morphine injection 2 mg  2 mg IntraVENous Q1H PRN    sodium chloride (NS) flush 5-40 mL  5-40 mL IntraVENous Q8H    sodium chloride (NS) flush 5-40 mL  5-40 mL IntraVENous PRN    alum-mag hydroxide-simeth (MYLANTA) oral suspension 30 mL  30 mL Oral Q4H PRN    calcium-vitamin D (OS-FELICE +D3) 500 mg-200 unit per tablet 1 Tablet  1 Tablet Oral TID WITH MEALS    aspirin delayed-release tablet 325 mg  325 mg Oral DAILY    metoprolol tartrate (LOPRESSOR) tablet 25 mg  25 mg Oral Q12H    sodium chloride (NS) flush 5-40 mL  5-40 mL IntraVENous Q8H    sodium chloride (NS) flush 5-40 mL  5-40 mL IntraVENous PRN    acetaminophen (TYLENOL) tablet 650 mg  650 mg Oral Q8H    oxyCODONE IR (ROXICODONE) tablet 5 mg  5 mg Oral Q4H PRN    lisinopriL (PRINIVIL, ZESTRIL) tablet 40 mg  40 mg Oral DAILY    cyanocobalamin tablet 1,000 mcg  1,000 mcg Oral DAILY    divalproex (DEPAKOTE SPRINKLE) capsule 125 mg  125 mg Oral BID    donepeziL (ARICEPT) tablet 10 mg  10 mg Oral QHS    memantine (NAMENDA) tablet 10 mg  10 mg Oral BID       LAB:    Recent Labs     12/19/21  0617 12/17/21  1742 12/16/21  1720   HCT 22.4*   < >  --    HGB 7.4*   < >  --    INR  --   --  1.2    < > = values in this interval not displayed.        24 Hour Assessment Issues:    Oriented    Discharge Planning: SNF    Transfuse PRBC's:      Assessment & Physician's Comment:  Neurovascular checks within normal limits    Principal Problem:    Closed right hip fracture (HCC) (12/15/2021)    Active Problems:    Psychiatric disorder ()      Acute postoperative anemia due to expected blood loss (12/19/2021)        Plan:  Cont pT  Monitor HGB    Deysi West MD

## 2021-12-19 NOTE — CONSULTS
Urology Consult    Subjective:     Date of Consultation:  December 19, 2021    Patient is an 25-year-old white female with significant dementia who recently suffered anterior trochanteric fracture of the right hip. She is now status post surgical repair of same. Today she had a bladder scan that indicated urinary retention. Attempts were made to place a Jane catheter but were unsuccessful. She did have a Jane catheter while she was in surgery. She is in a nursing home and according to the family bladder function has not been a tremendous problem for her. Prior to her injury she would go to the bathroom on her own. No history of incontinence. Her creatinine is 0.43 during this admission. According to the nurse apparently she did have some urine to go into her diaper after her post void residual was obtained. Urine culture has grown out multiple organisms which is consistent with contamination and not an active UTI. Past Medical History:   Diagnosis Date    Hypertension     Psychiatric disorder       No past surgical history on file. No family history on file. Social History     Tobacco Use    Smoking status: Not on file    Smokeless tobacco: Not on file   Substance Use Topics    Alcohol use: Not Currently     No Known Allergies   Prior to Admission medications    Medication Sig Start Date End Date Taking? Authorizing Provider   divalproex (DEPAKOTE SPRINKLE) 125 mg capsule Take 125 mg by mouth two (2) times a day. Pinky Garduno MD   cyanocobalamin 1,000 mcg tablet Take 1,000 mcg by mouth daily. Pinky Garduno MD   QUEtiapine (SEROQUEL) 50 mg tablet Take 50 mg by mouth daily. Pinky Garduno MD   mirtazapine (REMERON) 30 mg tablet Take  by mouth nightly. Pinky Garduno MD   memantine (NAMENDA) 10 mg tablet Take 10 mg by mouth two (2) times a day. Pinky Garduno MD   donepezil (ARICEPT) 10 mg tablet Take 10 mg by mouth nightly.     Pinky Garduno MD   lisinopril (PRINIVIL, ZESTRIL) 20 mg tablet Take  by mouth daily. Pinky Garduno MD   risperiDONE microspheres (RISPERDAL CONSTA) 50 mg/2 mL injection 50 mg by IntraMUSCular route Twice a month. Pinky Garduno MD         Review of Systems: Unobtainable secondary to dementia    Objective:     Patient Vitals for the past 8 hrs:   BP Temp Pulse Resp SpO2   21 1622 (!) 160/90 98.1 °F (36.7 °C) 68 18 94 %   21 1446 (!) 158/81 -- 81 18 98 %   21 1429 136/65 98.3 °F (36.8 °C) 71 16 100 %   21 1241 (!) 144/89 97.6 °F (36.4 °C) 68 18 98 %     Temp (24hrs), Av.9 °F (36.6 °C), Min:97.4 °F (36.3 °C), Max:98.3 °F (36.8 °C)      Intake and Output:    1901 -  0700  In: -   Out: 26 [Urine:470]    Physical Exam: The patient clearly has significant dementia and can give no history. She has mittens on. The abdomen is soft and nontender. She is relatively thin and I cannot palpate her bladder. The diaper is on and there is some urine in the diaper. A limited vaginal exam was performed which is hampered by her recent surgery and lack of cooperation. With bimanual I cannot palpate her bladder. Assessment:     Principal Problem:    Closed right hip fracture ([address] (12/15/2021)    Active Problems:    Psychiatric disorder ()      Acute postoperative anemia due to expected blood loss (2021)      Early onset Alzheimer's dementia ([address] (2021)        The patient is not emptying well after surgery and this is probably due to a number of issues including dementia and lack of mobility after hip fracture. I would stress that her urine culture is not consistent with UTI. I would think it would be quite difficult to get an adequate sample for UA and culture. Plan: For now I would pursue a course of watchful waiting. The patient is comfortable and her creatinine is well within the normal limit. She should wear diaper. We will repeat a creatinine and get a renal ultrasound tomorrow.   If there is no hydronephrosis I do not think that there is a compelling reason to place a Jane catheter.

## 2021-12-19 NOTE — PROGRESS NOTES
Hospitalist Progress Note   Admit Date:  12/15/2021  3:57 PM   Name:  Rick Moss   Age:  80 y.o. Sex:  female  :  1940   MRN:  [id number]   Room:  [facility]/    Presenting Complaint: Fall    Reason(s) for Admission: Closed right hip fracture [facility]) [facility] Course & Interval History:   Rick Moss is a 80 y.o. female with medical history of severe dementia who presented from [place] [id number] in memory unit after SNF s/p fall hitting her head associated with R leg pain. Unclear what happened but per EMS, patient fell backward and struck her head but did not have LOC. At bedside, patient was pleasantly demented, alert but not oriented to person, place or time. Stated that she was \"doing okay. \"    In ED, VSS. CT head and CT C-spine were unremarkable. CXR unremarkable. Hip x-ray shows comminuted intertrochanteric fracture of the proximal right femur. Labs on admission only notable for K3.0. Pt was admitted for further medical mgmt with ortho consult requested. Subjective (21):  Pt is resting in bed, appears agitated and restless. Following commands intermittently. Overnight, pt noted to have urinary retention. Unable to ROS with patient due to severe dementia    Assessment & Plan:     Principal Problem:    Closed right hip fracture ([address] (12/15/2021)  - POD#2, s/p right gamma nail insertion  - needs STR on dc    Active Problems:    Hypokalemia  - improving, 3.3 today  :  Will order KCL IV 10 meq x 3 doses      Uncontrolled HTN  - cont lisinopril / metoprolol (newly started)  - acceptable control      UTI  - 2021 urine cx mixed tariq  - transition to vantin for empiric tx total 7-day course (EOT 2021)      ABLA (post surgery)  - hgb drop 13.3 ~> 9.8 ~> 8.7 --> 7.4 this AM  : ordered for 1 unit PRBC for today      Severe dementia  :  Stopped namenda and aricept  Likely contributing to urinary retention      Debility  - HIGH RISK FOR RECURRENT FALLS  - PT/OT eval pending        Dispo/Discharge Planning:    - pending clinical course. Pt does have a bed at [facility]. Diet:  ADULT DIET Regular  DVT PPx: ASA 325mg daily  Code status: DNR    Hospital Problems as of 12/19/2021 Never Reviewed          Codes Class Noted - Resolved POA    Acute postoperative anemia due to expected blood loss ICD-10-CM: D62  ICD-9-CM: 285.1  12/19/2021 - Present Yes        Early onset Alzheimer's dementia ([facility] [address] ICD-10-CM: G30.0, F02.80  ICD-9-CM: 331.0  12/19/2021 - Present Yes        * (Principal) Closed right hip fracture (HCC) ICD-10-CM: S72.001A  ICD-9-CM: 820.8  12/15/2021 - Present Yes        Psychiatric disorder ICD-10-CM: F24  ICD-9-CM: 298.9  Unknown - Present Unknown              Objective:     Patient Vitals for the past 24 hrs:   Temp Pulse Resp BP SpO2   12/19/21 0750 97.6 °F (36.4 °C) (!) 116 18 131/88 98 %   12/19/21 0340 97.4 °F (36.3 °C) 70 19 117/71 99 %   12/18/21 2317 98 °F (36.7 °C) 81 17 139/76 94 %   12/18/21 2023 98.1 °F (36.7 °C) 71 20 122/70 94 %   12/18/21 1051 98.5 °F (36.9 °C) 69 20 121/67 94 %     Oxygen Therapy  O2 Sat (%): 98 % (12/19/21 0750)  Pulse via Oximetry: 66 beats per minute (12/17/21 1944)  O2 Device: None (Room air) (12/18/21 1049)  FIO2 (%): 21 % (12/18/21 0145)    There is no height or weight on file to calculate BMI. Intake/Output Summary (Last 24 hours) at 12/19/2021 0803  Last data filed at 12/19/2021 0512  Gross per 24 hour   Intake --   Output 120 ml   Net -120 ml         Physical Exam:     Blood pressure 131/88, pulse (!) 116, temperature 97.6 °F (36.4 °C), resp. rate 18, SpO2 98 %. General:    Alert/awake, confused, restless/agitated, on RA. HEENT:           Head NCAT, PERRLA+, MM dry  Neck:  Trachea grossly midline, neck non-tender to manipulation  CV:   RRR. No m/r/g. No jugular venous distension. Lungs:   CTAB/L  Abdomen: Bowel sounds present. Soft, nontender, nondistended.   Extremities: No tenderness to palpation RLE; bandage clean no gross bleeding  Skin:     No rashes and normal coloration. Warm and dry.     Neuro:  Moving all 4 ext spont, follows commands intermittently  Psych:  Affect flat    I have reviewed ordered lab tests and independently visualized imaging below:    Recent Labs:  Recent Results (from the past 48 hour(s))   PLEASE READ & DOCUMENT PPD TEST IN 24 HRS    Collection Time: 12/17/21  9:33 AM   Result Value Ref Range    PPD Negative Negative    mm Induration 0 0 - 5 mm   METABOLIC PANEL, BASIC    Collection Time: 12/17/21  5:42 PM   Result Value Ref Range    Sodium 147 (H) 136 - 145 mmol/L    Potassium 3.5 3.5 - 5.1 mmol/L    Chloride 114 (H) 98 - 107 mmol/L    CO2 29 21 - 32 mmol/L    Anion gap 4 (L) 7 - 16 mmol/L    Glucose 126 (H) 65 - 100 mg/dL    BUN 25 (H) 8 - 23 MG/DL    Creatinine 0.59 (L) 0.6 - 1.0 MG/DL    GFR est AA >60 >60 ml/min/1.73m2    GFR est non-AA >60 >60 ml/min/1.73m2    Calcium 9.3 8.3 - 10.4 MG/DL   CBC W/O DIFF    Collection Time: 12/17/21  5:42 PM   Result Value Ref Range    WBC 9.0 4.3 - 11.1 K/uL    RBC 3.26 (L) 4.05 - 5.2 M/uL    HGB 9.8 (L) 11.7 - 15.4 g/dL    HCT 30.5 (L) 35.8 - 46.3 %    MCV 93.6 79.6 - 97.8 FL    MCH 30.1 26.1 - 32.9 PG    MCHC 32.1 31.4 - 35.0 g/dL    RDW 13.1 11.9 - 14.6 %    PLATELET 380 (L) 874 - 450 K/uL    MPV 10.1 9.4 - 12.3 FL    ABSOLUTE NRBC 0.00 0.0 - 0.2 K/uL   METABOLIC PANEL, BASIC    Collection Time: 12/18/21  5:44 AM   Result Value Ref Range    Sodium 149 (H) 136 - 145 mmol/L    Potassium 3.7 3.5 - 5.1 mmol/L    Chloride 114 (H) 98 - 107 mmol/L    CO2 30 21 - 32 mmol/L    Anion gap 5 (L) 7 - 16 mmol/L    Glucose 98 65 - 100 mg/dL    BUN 27 (H) 8 - 23 MG/DL    Creatinine 0.67 0.6 - 1.0 MG/DL    GFR est AA >60 >60 ml/min/1.73m2    GFR est non-AA >60 >60 ml/min/1.73m2    Calcium 9.4 8.3 - 10.4 MG/DL   CBC WITH AUTOMATED DIFF    Collection Time: 12/18/21  5:44 AM   Result Value Ref Range    WBC 7.2 4.3 - 11.1 K/uL    RBC 2.99 (L) 4.05 - 5.2 M/uL    HGB 8.7 (L) 11.7 - 15.4 g/dL    HCT 27.3 (L) 35.8 - 46.3 %    MCV 91.3 79.6 - 97.8 FL    MCH 29.1 26.1 - 32.9 PG    MCHC 31.9 31.4 - 35.0 g/dL    RDW 13.1 11.9 - 14.6 %    PLATELET 543 (L) 491 - 450 K/uL    MPV 10.1 9.4 - 12.3 FL    ABSOLUTE NRBC 0.00 0.0 - 0.2 K/uL    DF AUTOMATED      NEUTROPHILS 77 43 - 78 %    LYMPHOCYTES 12 (L) 13 - 44 %    MONOCYTES 9 4.0 - 12.0 %    EOSINOPHILS 0 (L) 0.5 - 7.8 %    BASOPHILS 0 0.0 - 2.0 %    IMMATURE GRANULOCYTES 2 0.0 - 5.0 %    ABS. NEUTROPHILS 5.6 1.7 - 8.2 K/UL    ABS. LYMPHOCYTES 0.8 0.5 - 4.6 K/UL    ABS. MONOCYTES 0.7 0.1 - 1.3 K/UL    ABS. EOSINOPHILS 0.0 0.0 - 0.8 K/UL    ABS. BASOPHILS 0.0 0.0 - 0.2 K/UL    ABS. IMM.  GRANS. 0.2 0.0 - 0.5 K/UL   PLEASE READ & DOCUMENT PPD TEST IN 48 HRS    Collection Time: 12/18/21  9:33 AM   Result Value Ref Range    PPD Negative Negative    mm Induration 0 0 - 5 mm   METABOLIC PANEL, BASIC    Collection Time: 12/19/21  6:17 AM   Result Value Ref Range    Sodium 144 136 - 145 mmol/L    Potassium 3.3 (L) 3.5 - 5.1 mmol/L    Chloride 109 (H) 98 - 107 mmol/L    CO2 29 21 - 32 mmol/L    Anion gap 6 (L) 7 - 16 mmol/L    Glucose 84 65 - 100 mg/dL    BUN 27 (H) 8 - 23 MG/DL    Creatinine 0.43 (L) 0.6 - 1.0 MG/DL    GFR est AA >60 >60 ml/min/1.73m2    GFR est non-AA >60 >60 ml/min/1.73m2    Calcium 8.9 8.3 - 10.4 MG/DL   CBC WITH AUTOMATED DIFF    Collection Time: 12/19/21  6:17 AM   Result Value Ref Range    WBC 5.0 4.3 - 11.1 K/uL    RBC 2.44 (L) 4.05 - 5.2 M/uL    HGB 7.4 (L) 11.7 - 15.4 g/dL    HCT 22.4 (L) 35.8 - 46.3 %    MCV 91.8 79.6 - 97.8 FL    MCH 30.3 26.1 - 32.9 PG    MCHC 33.0 31.4 - 35.0 g/dL    RDW 12.8 11.9 - 14.6 %    PLATELET 783 (L) 447 - 450 K/uL    MPV 10.6 9.4 - 12.3 FL    ABSOLUTE NRBC 0.00 0.0 - 0.2 K/uL    DF AUTOMATED      NEUTROPHILS 70 43 - 78 %    LYMPHOCYTES 16 13 - 44 %    MONOCYTES 9 4.0 - 12.0 %    EOSINOPHILS 2 0.5 - 7.8 %    BASOPHILS 0 0.0 - 2.0 %    IMMATURE GRANULOCYTES 3 0.0 - 5.0 % ABS. NEUTROPHILS 3.5 1.7 - 8.2 K/UL    ABS. LYMPHOCYTES 0.8 0.5 - 4.6 K/UL    ABS. MONOCYTES 0.4 0.1 - 1.3 K/UL    ABS. EOSINOPHILS 0.1 0.0 - 0.8 K/UL    ABS. BASOPHILS 0.0 0.0 - 0.2 K/UL    ABS. IMM. GRANS. 0.1 0.0 - 0.5 K/UL       All Micro Results     Procedure Component Value Units Date/Time    CULTURE, URINE [[id number]] Collected: 12/16/21 0534    Order Status: Completed Specimen: Cath Urine Updated: 12/18/21 0934     Special Requests: NO SPECIAL REQUESTS        Culture result:       >100,000 COLONIES/mL MIXED SKIN AL ISOLATED                  THREE OR MORE TYPES OF ORGANISMS ARE PRESENT. THIS IS INDICATIVE OF CONTAMINATION DUE TO IMPROPER COLLECTION TECHNIQUE. PLEASE REPEAT COLLECTION UNLESS PATIENT HAS STARTED ANTIBIOTIC TREATMENT. MSSA/MRSA SC BY PCR, NASAL SWAB [[id number]] Collected: 12/16/21 0446    Order Status: Completed Specimen: Nasal swab Updated: 12/16/21 0706     Special Requests: NO SPECIAL REQUESTS        Culture result:       SA target not detected. A MRSA NEGATIVE, SA NEGATIVE test result does not preclude MRSA or SA nasal colonization. COVID-19 RAPID TEST [[id number]] Collected: 12/16/21 0229    Order Status: Completed Specimen: Nasopharyngeal Updated: 12/16/21 0309     Specimen source NOT SPECIFIED        COVID-19 rapid test Not detected        Comment:      The specimen is NEGATIVE for SARS-CoV-2, the novel coronavirus associated with COVID-19. A negative result does not rule out COVID-19. This test has been authorized by the FDA under an Emergency Use Authorization (EUA) for use by authorized laboratories. Fact sheet for Healthcare Providers: ConventionUpdate.co.nz  Fact sheet for Patients: ConventionUpdate.co.nz       Methodology: Isothermal Nucleic Acid Amplification               Other Studies:  No results found.     Current Meds:  Current Facility-Administered Medications Medication Dose Route Frequency    cefpodoxime (VANTIN) tablet 200 mg  200 mg Oral Q12H    lactated Ringers infusion  50 mL/hr IntraVENous CONTINUOUS    lidocaine (XYLOCAINE) 10 mg/mL (1 %) injection 0.1 mL  0.1 mL SubCUTAneous PRN    lip protectant (BLISTEX) ointment 1 Each  1 Each Topical PRN    morphine injection 2 mg  2 mg IntraVENous Q1H PRN    sodium chloride (NS) flush 5-40 mL  5-40 mL IntraVENous Q8H    sodium chloride (NS) flush 5-40 mL  5-40 mL IntraVENous PRN    alum-mag hydroxide-simeth (MYLANTA) oral suspension 30 mL  30 mL Oral Q4H PRN    calcium-vitamin D (OS-FELICE +D3) 500 mg-200 unit per tablet 1 Tablet  1 Tablet Oral TID WITH MEALS    aspirin delayed-release tablet 325 mg  325 mg Oral DAILY    metoprolol tartrate (LOPRESSOR) tablet 25 mg  25 mg Oral Q12H    sodium chloride (NS) flush 5-40 mL  5-40 mL IntraVENous Q8H    sodium chloride (NS) flush 5-40 mL  5-40 mL IntraVENous PRN    acetaminophen (TYLENOL) tablet 650 mg  650 mg Oral Q8H    oxyCODONE IR (ROXICODONE) tablet 5 mg  5 mg Oral Q4H PRN    lisinopriL (PRINIVIL, ZESTRIL) tablet 40 mg  40 mg Oral DAILY    cyanocobalamin tablet 1,000 mcg  1,000 mcg Oral DAILY    divalproex (DEPAKOTE SPRINKLE) capsule 125 mg  125 mg Oral BID    donepeziL (ARICEPT) tablet 10 mg  10 mg Oral QHS    memantine (NAMENDA) tablet 10 mg  10 mg Oral BID       Signed:  Rhys Linn MD    Part of this note may have been written by using a voice dictation software. The note has been proof read but may still contain some grammatical/other typographical errors.

## 2021-12-19 NOTE — PROGRESS NOTES
Bladder scanned patient and found to have 391 ml.  Patient then voided a few minutes later, however it was not the full amount that was scanned

## 2021-12-19 NOTE — PROGRESS NOTES
Problem: Falls - Risk of  Goal: *Absence of Falls  Description: Document Lyndsey Steele Fall Risk and appropriate interventions in the flowsheet.   Outcome: Progressing Towards Goal  Note: Fall Risk Interventions:  Mobility Interventions: Bed/chair exit alarm,OT consult for ADLs,Patient to call before getting OOB,PT Consult for mobility concerns    Mentation Interventions: Adequate sleep, hydration, pain control,Bed/chair exit alarm,Door open when patient unattended,Increase mobility,More frequent rounding,Reorient patient    Medication Interventions: Bed/chair exit alarm,Evaluate medications/consider consulting pharmacy,Patient to call before getting OOB,Teach patient to arise slowly    Elimination Interventions: Bed/chair exit alarm,Call light in reach,Patient to call for help with toileting needs,Stay With Me (per policy)    History of Falls Interventions: Bed/chair exit alarm,Door open when patient unattended,Investigate reason for fall         Problem: Patient Education: Go to Patient Education Activity  Goal: Patient/Family Education  Outcome: Progressing Towards Goal     Problem: Patient Education: Go to Patient Education Activity  Goal: Patient/Family Education  Outcome: Progressing Towards Goal     Problem: Hip Fracture: Post-Op Day 1  Goal: Activity/Safety  Outcome: Progressing Towards Goal  Goal: Diagnostic Test/Procedures  Outcome: Progressing Towards Goal  Goal: Nutrition/Diet  Outcome: Progressing Towards Goal  Goal: Medications  Outcome: Progressing Towards Goal  Goal: Respiratory  Outcome: Progressing Towards Goal  Goal: Treatments/Interventions/Procedures  Outcome: Progressing Towards Goal  Goal: Psychosocial  Outcome: Progressing Towards Goal  Goal: Discharge Planning  Outcome: Progressing Towards Goal  Goal: *Demonstrates progressive activity  Outcome: Progressing Towards Goal  Goal: *Optimal pain control at patient's stated goal  Outcome: Progressing Towards Goal  Goal: *Hemodynamically stable  Outcome: Progressing Towards Goal  Goal: *Discharge plan identified  Outcome: Progressing Towards Goal  Goal: *Absence of skin breakdown  Outcome: Progressing Towards Goal     Problem: Pressure Injury - Risk of  Goal: *Prevention of pressure injury  Description: Document Rodney Scale and appropriate interventions in the flowsheet.   Outcome: Progressing Towards Goal  Note: Pressure Injury Interventions:  Sensory Interventions: Assess changes in LOC,Assess need for specialty bed,Check visual cues for pain,Discuss PT/OT consult with provider,Keep linens dry and wrinkle-free,Pad between skin to skin,Pressure redistribution bed/mattress (bed type)    Moisture Interventions: Absorbent underpads,Apply protective barrier, creams and emollients,Limit adult briefs,Internal/External urinary devices    Activity Interventions: Increase time out of bed,Pressure redistribution bed/mattress(bed type),PT/OT evaluation    Mobility Interventions: HOB 30 degrees or less,Pressure redistribution bed/mattress (bed type),PT/OT evaluation    Nutrition Interventions: Document food/fluid/supplement intake,Offer support with meals,snacks and hydration    Friction and Shear Interventions: Apply protective barrier, creams and emollients                Problem: Patient Education: Go to Patient Education Activity  Goal: Patient/Family Education  Outcome: Progressing Towards Goal

## 2021-12-19 NOTE — PROGRESS NOTES
Pt only voided small amounts since brenner was removed 12/18 AM. Pt's bladder scan showed 608 mLs. Attempted to straight cath x2, but was unsuccessful. Straight cath and brenner insertion attempted by Urology RNs, but unsuccessful. Hospitalist notified. States to bladder scan pt again at 0500 and notify her with  results. 3550: Pt has voided about 120 mLs in PW cannister. Bladder scan showed 672 mLs. Hospitalist notified.  Orders received for routine urology consult for this AM.

## 2021-12-19 NOTE — PROGRESS NOTES
Good visit with this demented patient    Provided calming presence    Joined by staff as we prayed together    Patient was thankful

## 2021-12-19 NOTE — PROGRESS NOTES
Problem: Falls - Risk of  Goal: *Absence of Falls  Description: Document [place] Fall Risk and appropriate interventions in the flowsheet.   Outcome: Progressing Towards Goal  Note: Fall Risk Interventions:  Mobility Interventions: Bed/chair exit alarm,OT consult for ADLs,Patient to call before getting OOB,PT Consult for mobility concerns    Mentation Interventions: Bed/chair exit alarm,Door open when patient unattended,Evaluate medications/consider consulting pharmacy    Medication Interventions: Bed/chair exit alarm,Patient to call before getting OOB,Teach patient to arise slowly    Elimination Interventions: Bed/chair exit alarm,Call light in reach,Patient to call for help with toileting needs,Toileting schedule/hourly rounds    History of Falls Interventions: Bed/chair exit alarm,Consult care management for discharge planning,Door open when patient unattended         Problem: Patient Education: Go to Patient Education Activity  Goal: Patient/Family Education  Outcome: Progressing Towards Goal     Problem: Patient Education: Go to Patient Education Activity  Goal: Patient/Family Education  Outcome: Progressing Towards Goal     Problem: Hip Fracture: Post-Op Day 1  Goal: Off Pathway (Use only if patient is Off Pathway)  Outcome: Progressing Towards Goal  Goal: Activity/Safety  Outcome: Progressing Towards Goal  Goal: Diagnostic Test/Procedures  Outcome: Progressing Towards Goal  Goal: Nutrition/Diet  Outcome: Progressing Towards Goal  Goal: Medications  Outcome: Progressing Towards Goal  Goal: Respiratory  Outcome: Progressing Towards Goal  Goal: Treatments/Interventions/Procedures  Outcome: Progressing Towards Goal  Goal: Psychosocial  Outcome: Progressing Towards Goal  Goal: Discharge Planning  Outcome: Progressing Towards Goal  Goal: *Demonstrates progressive activity  Outcome: Progressing Towards Goal  Goal: *Optimal pain control at patient's stated goal  Outcome: Progressing Towards Goal  Goal: *Hemodynamically stable  Outcome: Progressing Towards Goal  Goal: *Discharge plan identified  Outcome: Progressing Towards Goal  Goal: *Absence of skin breakdown  Outcome: Progressing Towards Goal     Problem: Hip Fracture: Post-Op Day 2  Goal: Off Pathway (Use only if patient is Off Pathway)  Outcome: Progressing Towards Goal  Goal: Activity/Safety  Outcome: Progressing Towards Goal  Goal: Diagnostic Test/Procedures  Outcome: Progressing Towards Goal  Goal: Nutrition/Diet  Outcome: Progressing Towards Goal  Goal: Medications  Outcome: Progressing Towards Goal  Goal: Respiratory  Outcome: Progressing Towards Goal  Goal: Treatments/Interventions/Procedures  Outcome: Progressing Towards Goal  Goal: Psychosocial  Outcome: Progressing Towards Goal  Goal: Discharge Planning  Outcome: Progressing Towards Goal  Goal: *Optimal pain control at patient's stated goal  Outcome: Progressing Towards Goal  Goal: *Hemodynamically stable  Outcome: Progressing Towards Goal  Goal: *Adequate oxygenation  Outcome: Progressing Towards Goal  Goal: *Tolerates increased activity  Outcome: Progressing Towards Goal  Goal: *Tolerates nutrition therapy  Outcome: Progressing Towards Goal  Goal: *Absence of skin breakdown  Outcome: Progressing Towards Goal     Problem: Hip Fracture: Post-Op Day 3  Goal: Off Pathway (Use only if patient is Off Pathway)  Outcome: Progressing Towards Goal  Goal: Activity/Safety  Outcome: Progressing Towards Goal  Goal: Diagnostic Test/Procedures  Outcome: Progressing Towards Goal  Goal: Nutrition/Diet  Outcome: Progressing Towards Goal  Goal: Medications  Outcome: Progressing Towards Goal  Goal: Respiratory  Outcome: Progressing Towards Goal  Goal: Treatments/Interventions/Procedures  Outcome: Progressing Towards Goal  Goal: Psychosocial  Outcome: Progressing Towards Goal  Goal: Discharge Planning  Outcome: Progressing Towards Goal  Goal: *Met physical therapy criteria for discharge to next level of care  Outcome: Progressing Towards Goal  Goal: *Optimal pain control at patient's stated goal  Outcome: Progressing Towards Goal  Goal: *Hemodynamically stable  Outcome: Progressing Towards Goal  Goal: *Tolerating diet  Outcome: Progressing Towards Goal  Goal: *Active bowel function  Outcome: Progressing Towards Goal  Goal: *Adequate urinary output  Outcome: Progressing Towards Goal  Goal: *Absence of skin breakdown  Outcome: Progressing Towards Goal  Goal: *Patient verbalizes understanding of discharge instructions  Outcome: Progressing Towards Goal     Problem: Hip Fracture: Post-Op Day 4  Goal: Off Pathway (Use only if patient is Off Pathway)  Outcome: Progressing Towards Goal  Goal: Activity/Safety  Outcome: Progressing Towards Goal  Goal: Diagnostic Test/Procedures  Outcome: Progressing Towards Goal  Goal: Nutrition/Diet  Outcome: Progressing Towards Goal  Goal: Medications  Outcome: Progressing Towards Goal  Goal: Respiratory  Outcome: Progressing Towards Goal  Goal: Treatments/Interventions/Procedures  Outcome: Progressing Towards Goal  Goal: Psychosocial  Outcome: Progressing Towards Goal  Goal: Discharge Planning  Outcome: Progressing Towards Goal  Goal: *Met physical therapy criteria for discharge to next level of care  Outcome: Progressing Towards Goal  Goal: *Optimal pain control at patient's stated goal  Outcome: Progressing Towards Goal  Goal: *Hemodynamically stable  Outcome: Progressing Towards Goal  Goal: *Tolerating diet  Outcome: Progressing Towards Goal  Goal: *Active bowel function  Outcome: Progressing Towards Goal  Goal: *Adequate urinary output  Outcome: Progressing Towards Goal  Goal: *Absence of skin breakdown  Outcome: Progressing Towards Goal  Goal: *Patient verbalizes understanding of discharge instructions  Outcome: Progressing Towards Goal

## 2021-12-20 ENCOUNTER — APPOINTMENT (OUTPATIENT)
Dept: ULTRASOUND IMAGING | Age: 81
DRG: 481 | End: 2021-12-20
Attending: UROLOGY
Payer: MEDICARE

## 2021-12-20 LAB
ABO + RH BLD: NORMAL
ANION GAP SERPL CALC-SCNC: 4 MMOL/L (ref 7–16)
BASOPHILS # BLD: 0 K/UL (ref 0–0.2)
BASOPHILS NFR BLD: 0 % (ref 0–2)
BLD PROD TYP BPU: NORMAL
BLOOD GROUP ANTIBODIES SERPL: NORMAL
BPU ID: NORMAL
BUN SERPL-MCNC: 19 MG/DL (ref 8–23)
CALCIUM SERPL-MCNC: 8.6 MG/DL (ref 8.3–10.4)
CHLORIDE SERPL-SCNC: 107 MMOL/L (ref 98–107)
CO2 SERPL-SCNC: 33 MMOL/L (ref 21–32)
CREAT SERPL-MCNC: 0.35 MG/DL (ref 0.6–1)
CROSSMATCH RESULT,%XM: NORMAL
DIFFERENTIAL METHOD BLD: ABNORMAL
EOSINOPHIL # BLD: 0.1 K/UL (ref 0–0.8)
EOSINOPHIL NFR BLD: 3 % (ref 0.5–7.8)
ERYTHROCYTE [DISTWIDTH] IN BLOOD BY AUTOMATED COUNT: 13.1 % (ref 11.9–14.6)
GLUCOSE SERPL-MCNC: 82 MG/DL (ref 65–100)
HCT VFR BLD AUTO: 24.8 % (ref 35.8–46.3)
HGB BLD-MCNC: 8.2 G/DL (ref 11.7–15.4)
IMM GRANULOCYTES # BLD AUTO: 0.3 K/UL (ref 0–0.5)
IMM GRANULOCYTES NFR BLD AUTO: 6 % (ref 0–5)
LYMPHOCYTES # BLD: 0.8 K/UL (ref 0.5–4.6)
LYMPHOCYTES NFR BLD: 17 % (ref 13–44)
MCH RBC QN AUTO: 29.3 PG (ref 26.1–32.9)
MCHC RBC AUTO-ENTMCNC: 33.1 G/DL (ref 31.4–35)
MCV RBC AUTO: 88.6 FL (ref 79.6–97.8)
MONOCYTES # BLD: 0.4 K/UL (ref 0.1–1.3)
MONOCYTES NFR BLD: 8 % (ref 4–12)
NEUTS SEG # BLD: 2.9 K/UL (ref 1.7–8.2)
NEUTS SEG NFR BLD: 66 % (ref 43–78)
NRBC # BLD: 0 K/UL (ref 0–0.2)
PLATELET # BLD AUTO: 127 K/UL (ref 150–450)
PLATELET COMMENTS,PCOM: SLIGHT
PMV BLD AUTO: 9.8 FL (ref 9.4–12.3)
POTASSIUM SERPL-SCNC: 3.4 MMOL/L (ref 3.5–5.1)
RBC # BLD AUTO: 2.8 M/UL (ref 4.05–5.2)
RBC MORPH BLD: ABNORMAL
SODIUM SERPL-SCNC: 144 MMOL/L (ref 136–145)
SPECIMEN EXP DATE BLD: NORMAL
STATUS OF UNIT,%ST: NORMAL
UNIT DIVISION, %UDIV: 0
WBC # BLD AUTO: 4.5 K/UL (ref 4.3–11.1)
WBC MORPH BLD: ABNORMAL

## 2021-12-20 PROCEDURE — 97535 SELF CARE MNGMENT TRAINING: CPT

## 2021-12-20 PROCEDURE — 97530 THERAPEUTIC ACTIVITIES: CPT

## 2021-12-20 PROCEDURE — 36415 COLL VENOUS BLD VENIPUNCTURE: CPT

## 2021-12-20 PROCEDURE — 74011250636 HC RX REV CODE- 250/636: Performed by: ORTHOPAEDIC SURGERY

## 2021-12-20 PROCEDURE — 74011250637 HC RX REV CODE- 250/637: Performed by: HOSPITALIST

## 2021-12-20 PROCEDURE — 65270000029 HC RM PRIVATE

## 2021-12-20 PROCEDURE — 74011250637 HC RX REV CODE- 250/637: Performed by: ORTHOPAEDIC SURGERY

## 2021-12-20 PROCEDURE — 74011250636 HC RX REV CODE- 250/636: Performed by: HOSPITALIST

## 2021-12-20 PROCEDURE — 80048 BASIC METABOLIC PNL TOTAL CA: CPT

## 2021-12-20 PROCEDURE — 2709999900 HC NON-CHARGEABLE SUPPLY

## 2021-12-20 PROCEDURE — 74011250637 HC RX REV CODE- 250/637: Performed by: PHYSICIAN ASSISTANT

## 2021-12-20 PROCEDURE — 76770 US EXAM ABDO BACK WALL COMP: CPT

## 2021-12-20 PROCEDURE — 85025 COMPLETE CBC W/AUTO DIFF WBC: CPT

## 2021-12-20 RX ORDER — POTASSIUM CHLORIDE 14.9 MG/ML
10 INJECTION INTRAVENOUS
Status: COMPLETED | OUTPATIENT
Start: 2021-12-20 | End: 2021-12-20

## 2021-12-20 RX ADMIN — POTASSIUM CHLORIDE 10 MEQ: 200 INJECTION, SOLUTION INTRAVENOUS at 17:23

## 2021-12-20 RX ADMIN — MORPHINE SULFATE 2 MG: 2 INJECTION, SOLUTION INTRAMUSCULAR; INTRAVENOUS at 09:34

## 2021-12-20 RX ADMIN — DIVALPROEX SODIUM 125 MG: 125 CAPSULE ORAL at 09:34

## 2021-12-20 RX ADMIN — Medication 10 ML: at 23:07

## 2021-12-20 RX ADMIN — QUETIAPINE FUMARATE 50 MG: 25 TABLET ORAL at 23:07

## 2021-12-20 RX ADMIN — POTASSIUM CHLORIDE 10 MEQ: 200 INJECTION, SOLUTION INTRAVENOUS at 18:43

## 2021-12-20 RX ADMIN — CEFPODOXIME PROXETIL 200 MG: 200 TABLET, FILM COATED ORAL at 09:34

## 2021-12-20 RX ADMIN — CEFPODOXIME PROXETIL 200 MG: 200 TABLET, FILM COATED ORAL at 23:07

## 2021-12-20 RX ADMIN — Medication 10 ML: at 05:32

## 2021-12-20 RX ADMIN — MORPHINE SULFATE 2 MG: 2 INJECTION, SOLUTION INTRAMUSCULAR; INTRAVENOUS at 14:42

## 2021-12-20 RX ADMIN — METOPROLOL TARTRATE 25 MG: 25 TABLET, FILM COATED ORAL at 23:07

## 2021-12-20 RX ADMIN — POTASSIUM CHLORIDE 10 MEQ: 200 INJECTION, SOLUTION INTRAVENOUS at 16:33

## 2021-12-20 RX ADMIN — ASPIRIN 325 MG: 325 TABLET, COATED ORAL at 09:34

## 2021-12-20 RX ADMIN — DIVALPROEX SODIUM 125 MG: 125 CAPSULE ORAL at 17:13

## 2021-12-20 RX ADMIN — CALCIUM CARBONATE 500 MG (1,250 MG)-VITAMIN D3 200 UNIT TABLET 1 TABLET: at 17:13

## 2021-12-20 RX ADMIN — CALCIUM CARBONATE 500 MG (1,250 MG)-VITAMIN D3 200 UNIT TABLET 1 TABLET: at 09:34

## 2021-12-20 NOTE — PROGRESS NOTES
Progress Note    Patient: Naa Zapata MRN: [id number]  SSN: [id number]    YOB: 1940  Age: 80 y.o. Sex: female      Admit Date: 12/15/2021    LOS: 5 days     Subjective:     Patient is not really able to offer any reasonable history    Objective:     Vitals:    12/19/21 1945 12/19/21 2006 12/20/21 0001 12/20/21 0428   BP: (!) 201/105 (!) 155/100 (!) 153/81 111/70   Pulse: 94  75 (!) 55   Resp: 18 18 16   Temp:   97.7 °F (36.5 °C) 98.2 °F (36.8 °C)   SpO2:   91% 94%        Intake and Output:  Current Shift: No intake/output data recorded. Last three shifts: 12/18 1901 - 12/20 0700  In: 322.9   Out: 320 [Urine:320]    arousable but not oriented  Skin - incision is well healed with no redness or drainage  Motor and sensory function intact in RIGHT LOWER extremity  Pulses palpable in RIGHT LOWER extremity       Lab/Data Review:  CBC: No results found for: WBC, HGB, HGBEXT, HCT, HCTEXT, PLT, PLTEXT, HGBEXT, HCTEXT, PLTEXT       Assessment:     Acute postop blood loss anemia most recent hemoglobin at 7.4, POD #3 from gamma nail fixation of right proximal femur fracture    Plan:     Patient may be weightbearing as tolerated on the right lower extremity. They can be full active and passive range of motion of the right hip. They can have dry dressing change starting on postoperative day 2 and then after that daily and as needed. They will need aspirin 325 mg 1 p.o. daily for 4 weeks as DVT prophylaxis as well as SCDs while hospitalized unless they are on a preoperative anticoagulant chronically and in this case they can restart this on postoperative day 2 and use this instead of the aspirin. They will need to have orthopedic follow-up approximately 2 weeks after discharge.     Signed By: Monty Li MD     December 20, 2021

## 2021-12-20 NOTE — PROGRESS NOTES
Call placed to patient's daughter to follow up. Patient was not able to have therapy over weekend secondary to agitation, urology workup and therapy schedule. Renal ultrasound scheduled for today. PPD and Covid testing completed.

## 2021-12-20 NOTE — PROGRESS NOTES
ACUTE PHYSICAL THERAPY GOALS:  (Developed with and agreed upon by patient and/or caregiver. )  LTG:  (1.)Ms. Cindy Berry will move from supine to sit and sit to supine , scoot up and down and roll side to side in bed with CONTACT GUARD ASSIST within 7 treatment day(s). (2.)Ms. Cindy Berry will transfer from bed to chair and chair to bed with CONTACT GUARD ASSIST using the least restrictive device within 7 treatment day(s). (3.)Ms. Cindy Berry will ambulate with CONTACT GUARD ASSIST for 100+ feet with the least restrictive device within 7 treatment day(s). PHYSICAL THERAPY: Daily Note and AM Treatment Day # 2     WBAT    Antoine Martinez is a 80 y.o. female   PRIMARY DIAGNOSIS: Closed right hip fracture (HCC)  Closed right hip fracture (HCC) [S72.001A]  Procedure(s) (LRB):  RIGHT GAMMA NAIL INSERTION (Right)  3 Days Post-Op    ASSESSMENT:     REHAB RECOMMENDATIONS: CURRENT LEVEL OF FUNCTION:  (Most Recently Demonstrated)   Recommendation to date pending progress:  Setting:   Short-term Rehab  Equipment:    To Be Determined Bed Mobility:   Maximal Assistance  Sit to Stand:   Moderate Assistance x 2  Transfers:   Moderate Assistance x 2  Gait/Mobility:   Not tested     ASSESSMENT:  Ms. Cindy Berry was supine with severe dementia. When she initiates movement and it hurts she does not understand and doesn't want to move. She required maximal assist to sit up but once seated seemed fairly comfortable with good sitting balance. She initiated standing a few times but then would hurt and would not proceed. We stood her up and helped her to the chair. She bore weight through her legs and took steps yelling throughout. Comfortable once in the chair. Cognition is her primary road block at the moment. SUBJECTIVE:   Ms. Cindy Berry states, \"no,no,no! .\"    SOCIAL HISTORY/ LIVING ENVIRONMENT:   Home Environment: Skilled nursing facility (memory care)  One/Two Story Residence: One story  Support Systems: Assisted Living,Child(carlos eduardo)  OBJECTIVE:     PAIN: VITAL SIGNS: LINES/DRAINS:   Pre Treatment: Pain Screen  Pain Scale 1: FLACC  Pain Intensity 1: 6  Post Treatment: 0   lap belt in chiar  O2 Device: None (Room air)     MOBILITY: I Mod I S SBA CGA Min Mod Max Total  NT x2 Comments:   Bed Mobility    Rolling [] [] [] [] [] [] [] [] [] [] []    Supine to Sit [] [] [] [] [] [] [] [x] [] [] []    Scooting [] [] [] [] [] [] [] [] [] [] []    Sit to Supine [] [] [] [] [] [] [] [] [] [] []    Transfers    Sit to Stand [] [] [] [] [] [] [x] [x] [] [] [x]    Bed to Chair [] [] [] [] [] [] [x] [x] [] [] [x]    Stand to Sit [] [] [] [] [] [] [x] [x] [] [] [x]    I=Independent, Mod I=Modified Independent, S=Supervision, SBA=Standby Assistance, CGA=Contact Guard Assistance,   Min=Minimal Assistance, Mod=Moderate Assistance, Max=Maximal Assistance, Total=Total Assistance, NT=Not Tested    BALANCE: Good Fair+ Fair Fair- Poor NT Comments   Sitting Static [] [] [] [] [] []    Sitting Dynamic [] [] [] [] [] []              Standing Static [] [] [] [] [] []    Standing Dynamic [] [] [] [] [] []      GAIT: I Mod I S SBA CGA Min Mod Max Total  NT x2 Comments:   Level of Assistance [] [] [] [] [] [] [] [] [] [] []    Distance NA    DME HHA    Gait Quality NA    Weightbearing  Status WBAT     I=Independent, Mod I=Modified Independent, S=Supervision, SBA=Standby Assistance, CGA=Contact Guard Assistance,   Min=Minimal Assistance, Mod=Moderate Assistance, Max=Maximal Assistance, Total=Total Assistance, NT=Not Tested    PLAN:   FREQUENCY/DURATION: PT Plan of Care: BID for duration of hospital stay or until stated goals are met, whichever comes first.  TREATMENT:     TREATMENT:   ($$ Therapeutic Activity: 8-22 mins    )  Co-Treatment PT/OT necessary due to patient's decreased overall endurance/tolerance levels, as well as need for high level skilled assistance to complete functional transfers/mobility and functional tasks  Therapeutic Activity (15 Minutes): Therapeutic activity included Supine to Sit, Transfer Training, Sitting balance  and Standing balance to improve functional Mobility, Strength and Activity tolerance.     TREATMENT GRID:  N/A    AFTER TREATMENT POSITION/PRECAUTIONS:  Alarm Activated, Chair, Needs within reach, RN notified and mitt on L and lap belt    INTERDISCIPLINARY COLLABORATION:  RN/PCT, PT/PTA and OT/WYATT    TOTAL TREATMENT DURATION:  PT Patient Time In/Time Out  Time In: 1105  Time Out: Serjio Strong 96, PTA

## 2021-12-20 NOTE — PROGRESS NOTES
ACUTE PHYSICAL THERAPY GOALS:  (Developed with and agreed upon by patient and/or caregiver. )  LTG:  (1.)Ms. Lea Cabrera will move from supine to sit and sit to supine , scoot up and down and roll side to side in bed with CONTACT GUARD ASSIST within 7 treatment day(s). (2.)Ms. Lea Cabrera will transfer from bed to chair and chair to bed with CONTACT GUARD ASSIST using the least restrictive device within 7 treatment day(s). (3.)Ms. Lea Cabrera will ambulate with CONTACT GUARD ASSIST for 100+ feet with the least restrictive device within 7 treatment day(s). PHYSICAL THERAPY: Daily Note and PM Treatment Day # 2     WBAT    Mae Martinez is a 80 y.o. female   PRIMARY DIAGNOSIS: Closed right hip fracture (HCC)  Closed right hip fracture (HCC) [S72.001A]  Procedure(s) (LRB):  RIGHT GAMMA NAIL INSERTION (Right)  3 Days Post-Op    ASSESSMENT:     REHAB RECOMMENDATIONS: CURRENT LEVEL OF FUNCTION:  (Most Recently Demonstrated)   Recommendation to date pending progress:  Setting:   Short-term Rehab  Equipment:    To Be Determined Bed Mobility:   Maximal Assistance  Sit to Stand:   Moderate Assistance x 2  Transfers:   Moderate Assistance x 2  Gait/Mobility:   Not tested     ASSESSMENT:  Ms. Lea Cabrera was supine with severe dementia. When she initiates movement and it hurts she does not understand and doesn't want to move. She required maximal assist to sit up but once seated seemed fairly comfortable with good sitting balance. She initiated standing a few times but then would hurt and would not proceed. We stood her up and helped her to the chair. She bore weight through her legs and took steps yelling throughout. Comfortable once in the chair. Cognition is her primary road block at the moment. PM: patient stayed up in the chair 3.5 hours fairly comfortably and is now starting to become more fidgity. She needed moderate assist x2 to stand and pivot to the bed with some leg buckling.   She scooted well in the bed and then seemed to want to stand again and wanted help. With HHA x 2 she stood from the EOB realized it hurt and sat back down. Will continue efforts to mobilize. SUBJECTIVE:   Ms. Lokesh Groves states, \"no,no,no! .\"    SOCIAL HISTORY/ LIVING ENVIRONMENT:   Home Environment: Skilled nursing facility (memory care)  One/Two Story Residence: One story  Support Systems: Assisted Living,Child(carlos eduardo)  OBJECTIVE:     PAIN: VITAL SIGNS: LINES/DRAINS:   Pre Treatment: Pain Screen  Pain Scale 1: FLACC  Pain Intensity 1: 5 (with standing)  Post Treatment: 0   lap belt in chiar  O2 Device: None (Room air)     MOBILITY: I Mod I S SBA CGA Min Mod Max Total  NT x2 Comments:   Bed Mobility    Rolling [] [] [] [] [] [] [] [] [] [] []    Supine to Sit [] [] [] [] [] [] [] [x] [] [] []    Scooting [] [] [] [] [] [] [] [] [] [] []    Sit to Supine [] [] [] [] [] [] [] [] [] [] []    Transfers    Sit to Stand [] [] [] [] [] [] [x] [x] [] [] [x]    Bed to Chair [] [] [] [] [] [] [x] [x] [] [] [x]    Stand to Sit [] [] [] [] [] [] [x] [x] [] [] [x]    I=Independent, Mod I=Modified Independent, S=Supervision, SBA=Standby Assistance, CGA=Contact Guard Assistance,   Min=Minimal Assistance, Mod=Moderate Assistance, Max=Maximal Assistance, Total=Total Assistance, NT=Not Tested    BALANCE: Good Fair+ Fair Fair- Poor NT Comments   Sitting Static [] [] [] [] [] []    Sitting Dynamic [] [] [] [] [] []              Standing Static [] [] [] [] [] []    Standing Dynamic [] [] [] [] [] []      GAIT: I Mod I S SBA CGA Min Mod Max Total  NT x2 Comments:   Level of Assistance [] [] [] [] [] [] [] [] [] [] []    Distance NA    DME HHA    Gait Quality NA    Weightbearing  Status WBAT     I=Independent, Mod I=Modified Independent, S=Supervision, SBA=Standby Assistance, CGA=Contact Guard Assistance,   Min=Minimal Assistance, Mod=Moderate Assistance, Max=Maximal Assistance, Total=Total Assistance, NT=Not Tested    PLAN:   FREQUENCY/DURATION: PT Plan of Care: BID for duration of hospital stay or until stated goals are met, whichever comes first.  TREATMENT:     TREATMENT:   ($$ Therapeutic Activity: 8-22 mins    )  Therapeutic Activity (15 Minutes): Therapeutic activity included Sit to Supine, Scooting, Transfer Training, Sitting balance  and Standing balance to improve functional Mobility, Strength and Activity tolerance.     TREATMENT GRID:  N/A    AFTER TREATMENT POSITION/PRECAUTIONS:  Alarm Activated, Bed, Needs within reach and RN notified    INTERDISCIPLINARY COLLABORATION:  RN/PCT and PT/PTA    TOTAL TREATMENT DURATION:  PT Patient Time In/Time Out  Time In: 1500  Time Out: 222 Leo Napoles PTA

## 2021-12-20 NOTE — PROGRESS NOTES
Hospitalist Progress Note   Admit Date:  12/15/2021  3:57 PM   Name:  Maritza Webb   Age:  80 y.o. Sex:  female  :  1940   MRN:  [id number]   Room:  [facility]    Presenting Complaint: Fall    Reason(s) for Admission: Closed right hip fracture [facility]) [facility] Course & Interval History:   Maritza Webb is a 80 y.o. female with medical history of severe dementia who presented from [place] [address] in memory unit after SNF s/p fall hitting her head associated with R leg pain. Unclear what happened but per EMS, patient fell backward and struck her head but did not have LOC. At bedside, patient was pleasantly demented, alert but not oriented to person, place or time. Stated that she was \"doing okay. \"    In ED, VSS. CT head and CT C-spine were unremarkable. CXR unremarkable. Hip x-ray shows comminuted intertrochanteric fracture of the proximal right femur. Labs on admission only notable for K3.0. Pt was admitted for further medical mgmt with ortho consult requested. Subjective (21): Pt seen at bedside, resting in bed comfortably. Minimally verbal, not following commands. S/p 1 unit PRBC transfusion on   No acute overnight events.      Unable to ROS with patient due to severe dementia    Assessment & Plan:     Principal Problem:  :    Closed right hip fracture ([address] (12/15/2021)  - POD#3, s/p right gamma nail insertion  - needs STR on dc  Os-arturo 1 tab po tid      Active Problems:    Hypokalemia  :  K 3.4 this AM  : ordered for KCL IV 10 meq x 3 doses      Uncontrolled HTN  - cont lisinopril / metoprolol (newly started)  - acceptable control      UTI  - 2021 urine cx mixed tariq  - transition to vantin for empiric tx total 7-day course (EOT 2021)      ABLA (post surgery)  - hgb drop 13.3 ~> 9.8 ~> 8.7 --> 7.4  :  Hb 8.2, s/p 1 unit PRBC on       Severe dementia  12/19:  Stopped namenda and aricept  Likely contributing to urinary retention      Debility  - HIGH RISK FOR RECURRENT FALLS  - PT/OT eval pending        Dispo/Discharge Planning:    - pending clinical course. Pt does have a bed at [facility]. Diet:  ADULT DIET Regular  DVT PPx: ASA 325mg daily  Code status: DNR    Hospital Problems as of 12/20/2021 Never Reviewed          Codes Class Noted - Resolved POA    Acute postoperative anemia due to expected blood loss ICD-10-CM: D62  ICD-9-CM: 285.1  12/19/2021 - Present Yes        Early onset Alzheimer's dementia ([facility] [address] ICD-10-CM: G30.0, F02.80  ICD-9-CM: 331.0  12/19/2021 - Present Yes        * (Principal) Closed right hip fracture ([facility] [address] ICD-10-CM: S72.001A  ICD-9-CM: 820.8  12/15/2021 - Present Yes        Psychiatric disorder ICD-10-CM: R97  ICD-9-CM: 298.9  Unknown - Present Unknown              Objective:     Patient Vitals for the past 24 hrs:   Temp Pulse Resp BP SpO2   12/20/21 0816 97.7 °F (36.5 °C) (!) 56 15 125/75 90 %   12/20/21 0428 98.2 °F (36.8 °C) (!) 55 16 111/70 94 %   12/20/21 0001 97.7 °F (36.5 °C) 75 18 (!) 153/81 91 %   12/19/21 2006 -- -- -- (!) 155/100 --   12/19/21 1945 -- 94 18 (!) 201/105 --   12/19/21 1622 98.1 °F (36.7 °C) 68 18 (!) 160/90 94 %   12/19/21 1446 -- 81 18 (!) 158/81 98 %   12/19/21 1429 98.3 °F (36.8 °C) 71 16 136/65 100 %   12/19/21 1241 97.6 °F (36.4 °C) 68 18 (!) 144/89 98 %     Oxygen Therapy  O2 Sat (%): 90 % (12/20/21 0816)  Pulse via Oximetry: 66 beats per minute (12/17/21 1944)  O2 Device: None (Room air) (12/20/21 0816)  FIO2 (%): 21 % (12/18/21 0145)    There is no height or weight on file to calculate BMI. Intake/Output Summary (Last 24 hours) at 12/20/2021 0818  Last data filed at 12/20/2021 0428  Gross per 24 hour   Intake 322.9 ml   Output 200 ml   Net 122.9 ml         Physical Exam:     Blood pressure 125/75, pulse (!) 56, temperature 97.7 °F (36.5 °C), resp. rate 15, SpO2 90 %.   General:    Calm, confused at baseline, minimally verbal, NAD  HEENT:           Head NCAT, PERRLA+, MM dry  Neck:  Trachea grossly midline, neck non-tender to manipulation  CV:   RRR. No m/r/g. No jugular venous distension. Lungs:   CTAB/L  Abdomen: Bowel sounds present. Soft, nontender, nondistended. Extremities: No tenderness to palpation RLE; bandage clean no gross bleeding  Skin:     No rashes and normal coloration. Warm and dry. Neuro:  Moving all 4 ext spont, follows commands intermittently  Psych:  Affect flat    I have reviewed ordered lab tests and independently visualized imaging below:    Recent Labs:  Recent Results (from the past 48 hour(s))   PLEASE READ & DOCUMENT PPD TEST IN 48 HRS    Collection Time: 12/18/21  9:33 AM   Result Value Ref Range    PPD Negative Negative    mm Induration 0 0 - 5 mm   METABOLIC PANEL, BASIC    Collection Time: 12/19/21  6:17 AM   Result Value Ref Range    Sodium 144 136 - 145 mmol/L    Potassium 3.3 (L) 3.5 - 5.1 mmol/L    Chloride 109 (H) 98 - 107 mmol/L    CO2 29 21 - 32 mmol/L    Anion gap 6 (L) 7 - 16 mmol/L    Glucose 84 65 - 100 mg/dL    BUN 27 (H) 8 - 23 MG/DL    Creatinine 0.43 (L) 0.6 - 1.0 MG/DL    GFR est AA >60 >60 ml/min/1.73m2    GFR est non-AA >60 >60 ml/min/1.73m2    Calcium 8.9 8.3 - 10.4 MG/DL   CBC WITH AUTOMATED DIFF    Collection Time: 12/19/21  6:17 AM   Result Value Ref Range    WBC 5.0 4.3 - 11.1 K/uL    RBC 2.44 (L) 4.05 - 5.2 M/uL    HGB 7.4 (L) 11.7 - 15.4 g/dL    HCT 22.4 (L) 35.8 - 46.3 %    MCV 91.8 79.6 - 97.8 FL    MCH 30.3 26.1 - 32.9 PG    MCHC 33.0 31.4 - 35.0 g/dL    RDW 12.8 11.9 - 14.6 %    PLATELET 532 (L) 392 - 450 K/uL    MPV 10.6 9.4 - 12.3 FL    ABSOLUTE NRBC 0.00 0.0 - 0.2 K/uL    DF AUTOMATED      NEUTROPHILS 70 43 - 78 %    LYMPHOCYTES 16 13 - 44 %    MONOCYTES 9 4.0 - 12.0 %    EOSINOPHILS 2 0.5 - 7.8 %    BASOPHILS 0 0.0 - 2.0 %    IMMATURE GRANULOCYTES 3 0.0 - 5.0 %    ABS. NEUTROPHILS 3.5 1.7 - 8.2 K/UL    ABS. LYMPHOCYTES 0.8 0.5 - 4.6 K/UL    ABS. MONOCYTES 0.4 0.1 - 1.3 K/UL    ABS.  EOSINOPHILS 0.1 0.0 - 0.8 K/UL    ABS. BASOPHILS 0.0 0.0 - 0.2 K/UL    ABS. IMM. GRANS. 0.1 0.0 - 0.5 K/UL   RBC, ALLOCATE    Collection Time: 12/19/21  8:15 AM   Result Value Ref Range    HISTORY CHECKED? Historical check performed        All Micro Results     Procedure Component Value Units Date/Time    CULTURE, URINE [[id number]] Collected: 12/16/21 0534    Order Status: Completed Specimen: Cath Urine Updated: 12/18/21 0934     Special Requests: NO SPECIAL REQUESTS        Culture result:       >100,000 COLONIES/mL MIXED SKIN AL ISOLATED                  THREE OR MORE TYPES OF ORGANISMS ARE PRESENT. THIS IS INDICATIVE OF CONTAMINATION DUE TO IMPROPER COLLECTION TECHNIQUE. PLEASE REPEAT COLLECTION UNLESS PATIENT HAS STARTED ANTIBIOTIC TREATMENT. MSSA/MRSA SC BY PCR, NASAL SWAB [[id number]] Collected: 12/16/21 0446    Order Status: Completed Specimen: Nasal swab Updated: 12/16/21 0706     Special Requests: NO SPECIAL REQUESTS        Culture result:       SA target not detected. A MRSA NEGATIVE, SA NEGATIVE test result does not preclude MRSA or SA nasal colonization. COVID-19 RAPID TEST [[id number]] Collected: 12/16/21 0229    Order Status: Completed Specimen: Nasopharyngeal Updated: 12/16/21 0309     Specimen source NOT SPECIFIED        COVID-19 rapid test Not detected        Comment:      The specimen is NEGATIVE for SARS-CoV-2, the novel coronavirus associated with COVID-19. A negative result does not rule out COVID-19. This test has been authorized by the FDA under an Emergency Use Authorization (EUA) for use by authorized laboratories. Fact sheet for Healthcare Providers: ConventionUpdate.co.nz  Fact sheet for Patients: ConventionUpdate.co.nz       Methodology: Isothermal Nucleic Acid Amplification               Other Studies:  US RETROPERITONEUM COMP    Result Date: 12/20/2021  EXAM: Ultrasound of the kidneys. INDICATION: Urinary retention. COMPARISON: None. TECHNIQUE: A standard protocol kidney ultrasound was performed. FINDINGS: Both kidneys have a normal ultrasound appearance, each measuring 11.6 cm in length. No kidney stone, hydronephrosis or perinephric fluid is seen. There appears to be a large diverticulum along the left margin of the urinary bladder. The aorta and IVC are patent. 1. Normal bilateral kidneys. 2. Large bladder diverticulum. Current Meds:  Current Facility-Administered Medications   Medication Dose Route Frequency    0.9% sodium chloride infusion 250 mL  250 mL IntraVENous PRN    QUEtiapine (SEROquel) tablet 50 mg  50 mg Oral QHS    cefpodoxime (VANTIN) tablet 200 mg  200 mg Oral Q12H    lactated Ringers infusion  50 mL/hr IntraVENous CONTINUOUS    lidocaine (XYLOCAINE) 10 mg/mL (1 %) injection 0.1 mL  0.1 mL SubCUTAneous PRN    lip protectant (BLISTEX) ointment 1 Each  1 Each Topical PRN    morphine injection 2 mg  2 mg IntraVENous Q1H PRN    sodium chloride (NS) flush 5-40 mL  5-40 mL IntraVENous Q8H    sodium chloride (NS) flush 5-40 mL  5-40 mL IntraVENous PRN    alum-mag hydroxide-simeth (MYLANTA) oral suspension 30 mL  30 mL Oral Q4H PRN    calcium-vitamin D (OS-FELICE +D3) 500 mg-200 unit per tablet 1 Tablet  1 Tablet Oral TID WITH MEALS    aspirin delayed-release tablet 325 mg  325 mg Oral DAILY    metoprolol tartrate (LOPRESSOR) tablet 25 mg  25 mg Oral Q12H    sodium chloride (NS) flush 5-40 mL  5-40 mL IntraVENous Q8H    oxyCODONE IR (ROXICODONE) tablet 5 mg  5 mg Oral Q4H PRN    lisinopriL (PRINIVIL, ZESTRIL) tablet 40 mg  40 mg Oral DAILY    divalproex (DEPAKOTE SPRINKLE) capsule 125 mg  125 mg Oral BID       Signed:  Ariadna Goodman MD    Part of this note may have been written by using a voice dictation software. The note has been proof read but may still contain some grammatical/other typographical errors.

## 2021-12-20 NOTE — PROGRESS NOTES
Problem: Falls - Risk of  Goal: *Absence of Falls  Description: Document Rafat Jasso Fall Risk and appropriate interventions in the flowsheet.   Outcome: Progressing Towards Goal  Note: Fall Risk Interventions:  Mobility Interventions: Bed/chair exit alarm,OT consult for ADLs,Patient to call before getting OOB,PT Consult for mobility concerns    Mentation Interventions: Bed/chair exit alarm,Door open when patient unattended    Medication Interventions: Bed/chair exit alarm,Patient to call before getting OOB,Teach patient to arise slowly    Elimination Interventions: Bed/chair exit alarm,Call light in reach,Patient to call for help with toileting needs,Toileting schedule/hourly rounds    History of Falls Interventions: Bed/chair exit alarm,Door open when patient unattended,Consult care management for discharge planning         Problem: Patient Education: Go to Patient Education Activity  Goal: Patient/Family Education  Outcome: Progressing Towards Goal     Problem: Patient Education: Go to Patient Education Activity  Goal: Patient/Family Education  Outcome: Progressing Towards Goal     Problem: Hip Fracture: Post-Op Day 1  Goal: Off Pathway (Use only if patient is Off Pathway)  Outcome: Progressing Towards Goal  Goal: Activity/Safety  Outcome: Progressing Towards Goal  Goal: Diagnostic Test/Procedures  Outcome: Progressing Towards Goal  Goal: Nutrition/Diet  Outcome: Progressing Towards Goal  Goal: Medications  Outcome: Progressing Towards Goal  Goal: Respiratory  Outcome: Progressing Towards Goal  Goal: Treatments/Interventions/Procedures  Outcome: Progressing Towards Goal  Goal: Psychosocial  Outcome: Progressing Towards Goal  Goal: Discharge Planning  Outcome: Progressing Towards Goal  Goal: *Demonstrates progressive activity  Outcome: Progressing Towards Goal  Goal: *Optimal pain control at patient's stated goal  Outcome: Progressing Towards Goal  Goal: *Hemodynamically stable  Outcome: Progressing Towards Goal  Goal: *Discharge plan identified  Outcome: Progressing Towards Goal  Goal: *Absence of skin breakdown  Outcome: Progressing Towards Goal     Problem: Hip Fracture: Post-Op Day 2  Goal: Off Pathway (Use only if patient is Off Pathway)  Outcome: Progressing Towards Goal  Goal: Activity/Safety  Outcome: Progressing Towards Goal  Goal: Diagnostic Test/Procedures  Outcome: Progressing Towards Goal  Goal: Nutrition/Diet  Outcome: Progressing Towards Goal  Goal: Medications  Outcome: Progressing Towards Goal  Goal: Respiratory  Outcome: Progressing Towards Goal  Goal: Treatments/Interventions/Procedures  Outcome: Progressing Towards Goal  Goal: Psychosocial  Outcome: Progressing Towards Goal  Goal: Discharge Planning  Outcome: Progressing Towards Goal  Goal: *Optimal pain control at patient's stated goal  Outcome: Progressing Towards Goal  Goal: *Hemodynamically stable  Outcome: Progressing Towards Goal  Goal: *Adequate oxygenation  Outcome: Progressing Towards Goal  Goal: *Tolerates increased activity  Outcome: Progressing Towards Goal  Goal: *Tolerates nutrition therapy  Outcome: Progressing Towards Goal  Goal: *Absence of skin breakdown  Outcome: Progressing Towards Goal     Problem: Hip Fracture: Post-Op Day 3  Goal: Off Pathway (Use only if patient is Off Pathway)  Outcome: Progressing Towards Goal  Goal: Activity/Safety  Outcome: Progressing Towards Goal  Goal: Diagnostic Test/Procedures  Outcome: Progressing Towards Goal  Goal: Nutrition/Diet  Outcome: Progressing Towards Goal  Goal: Medications  Outcome: Progressing Towards Goal  Goal: Respiratory  Outcome: Progressing Towards Goal  Goal: Treatments/Interventions/Procedures  Outcome: Progressing Towards Goal  Goal: Psychosocial  Outcome: Progressing Towards Goal  Goal: Discharge Planning  Outcome: Progressing Towards Goal  Goal: *Met physical therapy criteria for discharge to next level of care  Outcome: Progressing Towards Goal  Goal: *Optimal pain control at patient's stated goal  Outcome: Progressing Towards Goal  Goal: *Hemodynamically stable  Outcome: Progressing Towards Goal  Goal: *Tolerating diet  Outcome: Progressing Towards Goal  Goal: *Active bowel function  Outcome: Progressing Towards Goal  Goal: *Adequate urinary output  Outcome: Progressing Towards Goal  Goal: *Absence of skin breakdown  Outcome: Progressing Towards Goal  Goal: *Patient verbalizes understanding of discharge instructions  Outcome: Progressing Towards Goal     Problem: Hip Fracture: Post-Op Day 4  Goal: Off Pathway (Use only if patient is Off Pathway)  Outcome: Progressing Towards Goal  Goal: Activity/Safety  Outcome: Progressing Towards Goal  Goal: Diagnostic Test/Procedures  Outcome: Progressing Towards Goal  Goal: Nutrition/Diet  Outcome: Progressing Towards Goal  Goal: Medications  Outcome: Progressing Towards Goal  Goal: Respiratory  Outcome: Progressing Towards Goal  Goal: Treatments/Interventions/Procedures  Outcome: Progressing Towards Goal  Goal: Psychosocial  Outcome: Progressing Towards Goal  Goal: Discharge Planning  Outcome: Progressing Towards Goal  Goal: *Met physical therapy criteria for discharge to next level of care  Outcome: Progressing Towards Goal  Goal: *Optimal pain control at patient's stated goal  Outcome: Progressing Towards Goal  Goal: *Hemodynamically stable  Outcome: Progressing Towards Goal  Goal: *Tolerating diet  Outcome: Progressing Towards Goal  Goal: *Active bowel function  Outcome: Progressing Towards Goal  Goal: *Adequate urinary output  Outcome: Progressing Towards Goal  Goal: *Absence of skin breakdown  Outcome: Progressing Towards Goal  Goal: *Patient verbalizes understanding of discharge instructions  Outcome: Progressing Towards Goal

## 2021-12-20 NOTE — WOUND CARE
Left hand skin tear is well healing, continue foam dressing. Left buttock/ sacral area with 4x3cm pink, open area, most consistent with shear/ friction, she had scars noted present on admission. Foam dressing, diligent incontinence care and frequent turning. Will monitor.

## 2021-12-20 NOTE — PROGRESS NOTES
ACUTE OT GOALS:  (Developed with and agreed upon by patient and/or caregiver.)  1. Pt will toilet with min A   2. Pt will complete functional transfers for ADLs with min A using AD as needed  3. Pt will complete upper body bathing and dressing with min A   4. Pt will complete grooming and hygiene with set up  5. Pt will complete bed mobility with SBA in prep for ADLs    Timeframe: 7 days      OCCUPATIONAL THERAPY: Daily Note OT Treatment Day # 3    Rachelle Vivar is a 80 y.o. female   PRIMARY DIAGNOSIS: Closed right hip fracture (HCC)  Closed right hip fracture ([address] [S72.001A]  Procedure(s) (LRB):  RIGHT GAMMA NAIL INSERTION (Right)  3 Days Post-Op  Reason for Referral:  L hip fracture  ICD-10: Treatment Diagnosis: History of falling (Z91.81)  [place]PATIENT[place] / [place] / Product Type: Medicare /   ASSESSMENT:     REHAB RECOMMENDATIONS:   Recommendation to date pending progress:  Setting:   Short-term Rehab  Equipment:    None     PRIOR LEVEL OF FUNCTION:  (Prior to Hospitalization)  INITIAL/CURRENT LEVEL OF FUNCTION:  (Based on most recently demonstrated)   Bathing:   Unknown  Dressing:   Unknown  Feeding/Grooming:   Unknown  Toileting:   Unknown  Functional Mobility:   Unknown Bathing:   Not tested  Dressing:   Total Assistance  Feeding/Grooming:   Total Assistance  Toileting:   Not tested  Functional Mobility:   Moderate Assistance x 2     ASSESSMENT:  Ms. Dequan Lane was admitted w/ a R hip fracture d/t falling, post op ORIF, WBAT. Pt was supine in bed upon arrival. Pt completed bed mobility and functional transfer with mod A X 2. Pt required total A to wash face and don socks. Continue POC. SUBJECTIVE:   Ms. Dequan Lane states, \"No I cant move. \"    SOCIAL HISTORY/LIVING ENVIRONMENT: Pt resides at [facility], unable to provide home or PLOF information d/t dementia.    Home Environment: Skilled nursing facility (memory care)  One/Two Story Residence: One story  Support Systems: Assisted Living,Child(carlos eduardo)    OBJECTIVE:     PAIN: VITAL SIGNS: LINES/DRAINS:   Pre Treatment: Pain Screen  Pain Scale 1: Numeric (0 - 10)  Pain Intensity 1: 0  Post Treatment: 7 (pain w/ mobility, relieved at rest)   O2 Device: None (Room air)       ACTIVITIES OF DAILY LIVING: I Mod I S SBA CGA Min Mod Max Total NT Comments   BASIC ADLs:              Bathing/ Showering [] [] [] [] [] [] [] [] [] [x]    Toileting [] [] [] [] [] [] [] [] [] [x]    Dressing [] [] [] [] [] [] [] [] [x] [] Socks    Feeding [] [] [] [] [] [] [] [] [] [x]    Grooming [] [] [] [] [] [] [] [] [x] [] Wash face    Personal Device Care [] [] [] [] [] [] [] [] [] [x]    Functional Mobility [] [] [] [] [] [] [x] [] [] []    I=Independent, Mod I=Modified Independent, S=Supervision, SBA=Standby Assistance, CGA=Contact Guard Assistance,   Min=Minimal Assistance, Mod=Moderate Assistance, Max=Maximal Assistance, Total=Total Assistance, NT=Not Tested    MOBILITY: I Mod I S SBA CGA Min Mod Max Total  NT x2 Comments:   Supine to sit [] [] [] [] [] [] [x] [] [] [] [x]    Sit to supine [] [] [] [] [] [] [] [] [] [x] []    Sit to stand [] [] [] [] [] [] [x] [] [] [] [x]    Bed to chair [] [] [] [] [] [] [x] [] [] [] [x]    I=Independent, Mod I=Modified Independent, S=Supervision, SBA=Standby Assistance, CGA=Contact Guard Assistance,   Min=Minimal Assistance, Mod=Moderate Assistance, Max=Maximal Assistance, Total=Total Assistance, NT=Not Tested    [address] [facility] [address] [id number] AM-PAC 6 Clicks   Daily Activity Inpatient Short Form        How much help from another person does the patient currently need. .. Total A Lot A Little None   1. Putting on and taking off regular lower body clothing? [x] 1   [] 2   [] 3   [] 4   2. Bathing (including washing, rinsing, drying)? [] 1   [x] 2   [] 3   [] 4   3. Toileting, which includes using toilet, bedpan or urinal?   [] 1   [x] 2   [] 3   [] 4   4. Putting on and taking off regular upper body clothing?    [] 1 [x] 2   [] 3   [] 4   5. Taking care of personal grooming such as brushing teeth? [] 1   [] 2   [x] 3   [] 4   6. Eating meals? [] 1   [] 2   [x] 3   [] 4   © 2007, Trustees of [facility] [employer], under license to [employer]. All rights reserved     Score:  Initial: 13 Most Recent: X (Date: -- )   Interpretation of Tool:  Represents activities that are increasingly more difficult (i.e. Bed mobility, Transfers, Gait). PLAN:   FREQUENCY/DURATION: OT Plan of Care: 6 times/week for duration of hospital stay or until stated goals are met, whichever comes first.    PROBLEM LIST:   (Skilled intervention is medically necessary to address:)  1. Decreased ADL/Functional Activities  2. Decreased Activity Tolerance  3. Decreased Balance  4. Decreased Cognition  5. Decreased Strength  6. Decreased Transfer Abilities  7. Increased Pain   INTERVENTIONS PLANNED:   (Benefits and precautions of occupational therapy have been discussed with the patient.)  1. Self Care Training  2. Therapeutic Activity  3. Therapeutic Exercise/HEP  4. Neuromuscular Re-education  5. Education     TREATMENT:     TREATMENT:   ($$ Self Care/Home Management: 23-37 mins    )  Co-Treatment PT/OT necessary due to patient's decreased overall endurance/tolerance levels, as well as need for high level skilled assistance to complete functional transfers/mobility and functional tasks  Self Care (23 Minutes): Self care including Lower Body Dressing and Grooming to increase independence and decrease level of assistance required.     TREATMENT GRID:  N/A    AFTER TREATMENT POSITION/PRECAUTIONS:  Alarm Activated, Chair, Needs within reach and RN notified    INTERDISCIPLINARY COLLABORATION:  RN/PCT and PT/PTA    TOTAL TREATMENT DURATION:  OT Patient Time In/Time Out  Time In: 1100  Time Out: [place], [place]

## 2021-12-21 LAB
ANION GAP SERPL CALC-SCNC: 6 MMOL/L (ref 7–16)
BUN SERPL-MCNC: 18 MG/DL (ref 8–23)
CALCIUM SERPL-MCNC: 9.1 MG/DL (ref 8.3–10.4)
CHLORIDE SERPL-SCNC: 104 MMOL/L (ref 98–107)
CO2 SERPL-SCNC: 30 MMOL/L (ref 21–32)
CREAT SERPL-MCNC: 0.57 MG/DL (ref 0.6–1)
GLUCOSE SERPL-MCNC: 109 MG/DL (ref 65–100)
POTASSIUM SERPL-SCNC: 3.6 MMOL/L (ref 3.5–5.1)
SODIUM SERPL-SCNC: 140 MMOL/L (ref 136–145)

## 2021-12-21 PROCEDURE — 74011250637 HC RX REV CODE- 250/637: Performed by: ORTHOPAEDIC SURGERY

## 2021-12-21 PROCEDURE — 74011250637 HC RX REV CODE- 250/637: Performed by: PHYSICIAN ASSISTANT

## 2021-12-21 PROCEDURE — 97530 THERAPEUTIC ACTIVITIES: CPT

## 2021-12-21 PROCEDURE — 2709999900 HC NON-CHARGEABLE SUPPLY

## 2021-12-21 PROCEDURE — 97535 SELF CARE MNGMENT TRAINING: CPT

## 2021-12-21 PROCEDURE — 36415 COLL VENOUS BLD VENIPUNCTURE: CPT

## 2021-12-21 PROCEDURE — 80048 BASIC METABOLIC PNL TOTAL CA: CPT

## 2021-12-21 PROCEDURE — 65270000029 HC RM PRIVATE

## 2021-12-21 PROCEDURE — 74011250637 HC RX REV CODE- 250/637: Performed by: HOSPITALIST

## 2021-12-21 RX ADMIN — DIVALPROEX SODIUM 125 MG: 125 CAPSULE ORAL at 17:00

## 2021-12-21 RX ADMIN — Medication 10 ML: at 21:29

## 2021-12-21 RX ADMIN — METOPROLOL TARTRATE 25 MG: 25 TABLET, FILM COATED ORAL at 21:29

## 2021-12-21 RX ADMIN — Medication 10 ML: at 06:32

## 2021-12-21 RX ADMIN — DIVALPROEX SODIUM 125 MG: 125 CAPSULE ORAL at 08:54

## 2021-12-21 RX ADMIN — METOPROLOL TARTRATE 25 MG: 25 TABLET, FILM COATED ORAL at 08:54

## 2021-12-21 RX ADMIN — CEFPODOXIME PROXETIL 200 MG: 200 TABLET, FILM COATED ORAL at 08:54

## 2021-12-21 RX ADMIN — LISINOPRIL 40 MG: 20 TABLET ORAL at 08:54

## 2021-12-21 RX ADMIN — CALCIUM CARBONATE 500 MG (1,250 MG)-VITAMIN D3 200 UNIT TABLET 1 TABLET: at 08:54

## 2021-12-21 RX ADMIN — Medication 10 ML: at 13:28

## 2021-12-21 RX ADMIN — ASPIRIN 325 MG: 325 TABLET, COATED ORAL at 08:54

## 2021-12-21 RX ADMIN — CEFPODOXIME PROXETIL 200 MG: 200 TABLET, FILM COATED ORAL at 21:29

## 2021-12-21 RX ADMIN — QUETIAPINE FUMARATE 50 MG: 25 TABLET ORAL at 21:29

## 2021-12-21 NOTE — PROGRESS NOTES
Problem: Falls - Risk of  Goal: *Absence of Falls  Description: Document Bard Melendez Fall Risk and appropriate interventions in the flowsheet.   Outcome: Progressing Towards Goal  Note: Fall Risk Interventions:  Mobility Interventions: Bed/chair exit alarm,Communicate number of staff needed for ambulation/transfer,Patient to call before getting OOB    Mentation Interventions: Bed/chair exit alarm,Door open when patient unattended,Increase mobility,More frequent rounding,Reorient patient    Medication Interventions: Bed/chair exit alarm,Patient to call before getting OOB,Teach patient to arise slowly    Elimination Interventions: Bed/chair exit alarm,Call light in reach,Stay With Me (per policy),Patient to call for help with toileting needs,Toilet paper/wipes in reach,Toileting schedule/hourly rounds    History of Falls Interventions: Bed/chair exit alarm         Problem: Hip Fracture: Post-Op Day 4  Goal: Activity/Safety  Outcome: Progressing Towards Goal  Goal: Diagnostic Test/Procedures  Outcome: Progressing Towards Goal  Goal: Nutrition/Diet  Outcome: Progressing Towards Goal  Goal: Medications  Outcome: Progressing Towards Goal  Goal: Respiratory  Outcome: Progressing Towards Goal  Goal: Treatments/Interventions/Procedures  Outcome: Progressing Towards Goal  Goal: Psychosocial  Outcome: Progressing Towards Goal  Goal: Discharge Planning  Outcome: Progressing Towards Goal  Goal: *Met physical therapy criteria for discharge to next level of care  Outcome: Progressing Towards Goal  Goal: *Optimal pain control at patient's stated goal  Outcome: Progressing Towards Goal  Goal: *Hemodynamically stable  Outcome: Progressing Towards Goal  Goal: *Tolerating diet  Outcome: Progressing Towards Goal  Goal: *Active bowel function  Outcome: Progressing Towards Goal  Goal: *Adequate urinary output  Outcome: Progressing Towards Goal  Goal: *Absence of skin breakdown  Outcome: Progressing Towards Goal  Goal: *Patient verbalizes understanding of discharge instructions  Outcome: Progressing Towards Goal     Problem: Pressure Injury - Risk of  Goal: *Prevention of pressure injury  Description: Document Rodney Scale and appropriate interventions in the flowsheet.   Outcome: Progressing Towards Goal  Note: Pressure Injury Interventions:  Sensory Interventions: Assess changes in LOC,Avoid rigorous massage over bony prominences    Moisture Interventions: Absorbent underpads,Check for incontinence Q2 hours and as needed    Activity Interventions: Increase time out of bed,Pressure redistribution bed/mattress(bed type),PT/OT evaluation    Mobility Interventions: HOB 30 degrees or less,Pressure redistribution bed/mattress (bed type),PT/OT evaluation    Nutrition Interventions: Document food/fluid/supplement intake,Offer support with meals,snacks and hydration    Friction and Shear Interventions: HOB 30 degrees or less,Foam dressings/transparent film/skin sealants,Apply protective barrier, creams and emollients

## 2021-12-21 NOTE — PROGRESS NOTES
Progress Note    Patient: Umm Wallace MRN: [id number]  SSN: [id number]    YOB: 1940  Age: 80 y.o. Sex: female      Admit Date: 12/15/2021    LOS: 6 days     Subjective:     Patient is really unable to give any history    Objective:     Vitals:    12/20/21 1200 12/20/21 2002 12/21/21 0038 12/21/21 0456   BP: 123/68 (!) 115/99 (!) 98/59 103/62   Pulse: 60 98 96 97   Resp: 16 16 18 18   Temp: 97.6 °F (36.4 °C) 98.4 °F (36.9 °C) 97.7 °F (36.5 °C) 98 °F (36.7 °C)   SpO2: 92% 100% 99% 95%        Intake and Output:  Current Shift: No intake/output data recorded. Last three shifts: 12/19 1901 - 12/21 0700  In: -   Out: 400 [Urine:400]    arousable but not oriented  Skin - incision is well healed with no redness or drainage  Motor and sensory function intact in RIGHT LOWER extremity  Pulses palpable in RIGHT LOWER extremity       Lab/Data Review:  CBC:   Lab Results   Component Value Date/Time    WBC 4.5 12/20/2021 09:32 AM    HGB 8.2 (L) 12/20/2021 09:32 AM    HCT 24.8 (L) 12/20/2021 09:32 AM     (L) 12/20/2021 09:32 AM          Assessment:     Acute postop blood loss anemia with hemoglobin at 8.2 and stable, POD 4 from right hip gamma nail fixation    Plan:     Patient may be weightbearing as tolerated on the right lower extremity. They can be full active and passive range of motion of the right hip. They can have dry dressing change starting on postoperative day 2 and then after that daily and as needed. They will need aspirin 325 mg 1 p.o. daily for 4 weeks as DVT prophylaxis as well as SCDs while hospitalized unless they are on a preoperative anticoagulant chronically and in this case they can restart this on postoperative day 2 and use this instead of the aspirin. They will need to have orthopedic follow-up approximately 2 weeks after discharge.     Signed By: Jennifer Madrid MD     December 21, 2021

## 2021-12-21 NOTE — PROGRESS NOTES
ACUTE PHYSICAL THERAPY GOALS:  (Developed with and agreed upon by patient and/or caregiver. )  LTG:  (1.)Ms. Dequan Lane will move from supine to sit and sit to supine , scoot up and down and roll side to side in bed with CONTACT GUARD ASSIST within 7 treatment day(s). (2.)Ms. Dequan Lane will transfer from bed to chair and chair to bed with CONTACT GUARD ASSIST using the least restrictive device within 7 treatment day(s). (3.)Ms. Dequan Lane will ambulate with CONTACT GUARD ASSIST for 100+ feet with the least restrictive device within 7 treatment day(s). PHYSICAL THERAPY: Daily Note and PM Treatment Day # 3     WBAT    Rachelle Vivar is a 80 y.o. female   PRIMARY DIAGNOSIS: Closed right hip fracture (HCC)  Closed right hip fracture (HCC) [S72.001A]  Procedure(s) (LRB):  RIGHT GAMMA NAIL INSERTION (Right)  4 Days Post-Op    ASSESSMENT:     REHAB RECOMMENDATIONS: CURRENT LEVEL OF FUNCTION:  (Most Recently Demonstrated)   Recommendation to date pending progress:  Setting:   Short-term Rehab  Equipment:    To Be Determined Bed Mobility:   Moderate Assistance x 2  Sit to Stand:   Moderate Assistance x 2  Transfers:   Moderate Assistance x 2  Gait/Mobility:   Not tested     ASSESSMENT:  Ms. Dequan Lane was supine with severe dementia. With encouragement she is able to come sit EOB with mod A x2 and perform sit to stand from elevated bed. Pt with decreased ability to follow commands and does not want to move when she begins to feel pain. Cries out throughout session and then refuses to perform ADLs with OT. Pt unstable in standing and needs assist for increased support and improved safety with mobility. PT will continue to follow. SUBJECTIVE:   Ms. Dequan Lane states, \"I can't. \"    SOCIAL HISTORY/ LIVING ENVIRONMENT:   Home Environment: Skilled nursing facility (memory care)  One/Two Story Residence: One story  Support Systems: Assisted Living,Child(carlos eduardo)  OBJECTIVE:     PAIN: VITAL SIGNS: LINES/DRAINS:   Pre Treatment: Pain Screen  Pain Scale 1: Numeric (0 - 10)  Pain Intensity 1: 0  Post Treatment: 0   lap belt in chiar  O2 Device: None (Room air)     MOBILITY: I Mod I S SBA CGA Min Mod Max Total  NT x2 Comments:   Bed Mobility    Rolling [] [] [] [] [] [] [] [] [] [x] []    Supine to Sit [] [] [] [] [] [] [x] [] [] [] [x]    Scooting [] [] [] [] [] [] [x] [] [] [] [x]    Sit to Supine [] [] [] [] [] [] [] [] [] [x] []    Transfers    Sit to Stand [] [] [] [] [] [] [x] [] [] [] [x]    Bed to Chair [] [] [] [] [] [] [x] [] [] [] [x]    Stand to Sit [] [] [] [] [] [] [x] [] [] [] [x]    I=Independent, Mod I=Modified Independent, S=Supervision, SBA=Standby Assistance, CGA=Contact Guard Assistance,   Min=Minimal Assistance, Mod=Moderate Assistance, Max=Maximal Assistance, Total=Total Assistance, NT=Not Tested    BALANCE: Good Fair+ Fair Fair- Poor NT Comments   Sitting Static [] [x] [] [] [] []    Sitting Dynamic [] [] [x] [] [] []              Standing Static [] [] [] [x] [] []    Standing Dynamic [] [] [] [] [x] []      GAIT: I Mod I S SBA CGA Min Mod Max Total  NT x2 Comments:   Level of Assistance [] [] [] [] [] [] [x] [] [] [] [x]    Distance 3'    DME Rolling Walker    Gait Quality Antalgic, step to     Weightbearing  Status WBAT     I=Independent, Mod I=Modified Independent, S=Supervision, SBA=Standby Assistance, CGA=Contact Guard Assistance,   Min=Minimal Assistance, Mod=Moderate Assistance, Max=Maximal Assistance, Total=Total Assistance, NT=Not Tested    PLAN:   FREQUENCY/DURATION: PT Plan of Care: BID for duration of hospital stay or until stated goals are met, whichever comes first.  TREATMENT:     TREATMENT:   ($$ Therapeutic Activity: 8-22 mins    )  Therapeutic Activity (16 Minutes): Therapeutic activity included Sit to Supine, Scooting, Transfer Training, Ambulation on level ground, Sitting balance  and Standing balance to improve functional Mobility, Strength and Activity tolerance.     TREATMENT GRID:  N/A    AFTER TREATMENT POSITION/PRECAUTIONS:  Alarm Activated, Chair, Needs within reach, Restraints  and RN notified    INTERDISCIPLINARY COLLABORATION:  RN/PCT and PT/PTA    TOTAL TREATMENT DURATION:  PT Patient Time In/Time Out  Time In: 2540  Time Out: [address], [place]

## 2021-12-21 NOTE — PROGRESS NOTES
Patient reviewed in IDT rounds. Attending has spoken with the patient's daughter who would like patient to return to [facility] with hospice care. Call placed to the Madhuri [id number], at [facility]. As patient has just had hip surgery, is still mobile and participate in therapy, they feel the patient would be best served at a skilled level of care to have constant medical supervision, therapy services, etc.  Parish Barrera has spoken with the patient's daughter who is agreeable and [facility] liaison notified and can accept patient as early as today. Attending notified via [email or web address].

## 2021-12-21 NOTE — PROGRESS NOTES
Hospitalist Progress Note   Admit Date:  12/15/2021  3:57 PM   Name:  Henny Shaw   Age:  80 y.o. Sex:  female  :  1940   MRN:  [id number]   Room:  [facility]    Presenting Complaint: Fall    Reason(s) for Admission: Closed right hip fracture [facility]) [facility] Course & Interval History:   Henny Shaw is a 80 y.o. female with medical history of severe dementia who presented from [place] [address] in memory unit after SNF s/p fall hitting her head associated with R leg pain. Unclear what happened but per EMS, patient fell backward and struck her head but did not have LOC. At bedside, patient was pleasantly demented, alert but not oriented to person, place or time. Stated that she was \"doing okay. \"    In ED, VSS. CT head and CT C-spine were unremarkable. CXR unremarkable. Hip x-ray shows comminuted intertrochanteric fracture of the proximal right femur. Labs on admission only notable for K3.0. Pt was admitted for further medical mgmt with ortho consult requested. Subjective (21): Pt seen at bedside, resting comfortably in bedside recliner. Minimally verbal, not following commands and stays minimally verbal.  No reported fever, vomiting and diarrhea.     Unable to ROS with patient due to severe dementia    Assessment & Plan:     Principal Problem:  :    Closed right hip fracture ([address] (12/15/2021)  - POD#4, s/p right gamma nail insertion  - needs STR on dc  Os-arturo 1 tab po tid      Active Problems:    Hypokalemia, replaced already  :  Rechecking today      Uncontrolled HTN  - cont lisinopril / metoprolol (newly started)  - acceptable control      UTI  - 2021 urine cx mixed tariq  - transition to vantin for empiric tx total 7-day course (EOT 2021)      ABLA (post surgery)  - hgb drop 13.3 ~> 9.8 ~> 8.7 --> 7.4  :  Hb 8.2, s/p 1 unit PRBC on       Severe dementia  :  Stopped namenda and aricept  Likely contributing to urinary retention      Debility  - HIGH RISK FOR RECURRENT FALLS  - PT/OT eval pending        Dispo/Discharge Planning:    - pending clinical course. Pt does have a bed at [facility]. Diet:  ADULT DIET Regular  DVT PPx: ASA 325mg daily  Code status: DNR    Hospital Problems as of 12/21/2021 Never Reviewed          Codes Class Noted - Resolved POA    Acute postoperative anemia due to expected blood loss ICD-10-CM: D62  ICD-9-CM: 285.1  12/19/2021 - Present Yes        Early onset Alzheimer's dementia ([facility] [address] ICD-10-CM: G30.0, F02.80  ICD-9-CM: 331.0  12/19/2021 - Present Yes        * (Principal) Closed right hip fracture (HCC) ICD-10-CM: S72.001A  ICD-9-CM: 820.8  12/15/2021 - Present Yes        Psychiatric disorder ICD-10-CM: U76  ICD-9-CM: 298.9  Unknown - Present Unknown              Objective:     Patient Vitals for the past 24 hrs:   Temp Pulse Resp BP SpO2   12/21/21 0812 98.6 °F (37 °C) 65 15 (!) 141/77 99 %   12/21/21 0456 98 °F (36.7 °C) 97 18 103/62 95 %   12/21/21 0038 97.7 °F (36.5 °C) 96 18 (!) 98/59 99 %   12/20/21 2002 98.4 °F (36.9 °C) 98 16 (!) 115/99 100 %   12/20/21 1200 97.6 °F (36.4 °C) 60 16 123/68 92 %     Oxygen Therapy  O2 Sat (%): 99 % (12/21/21 0812)  Pulse via Oximetry: 66 beats per minute (12/17/21 1944)  O2 Device: None (Room air) (12/21/21 0812)  FIO2 (%): 21 % (12/18/21 0145)    There is no height or weight on file to calculate BMI. Intake/Output Summary (Last 24 hours) at 12/21/2021 0826  Last data filed at 12/20/2021 1830  Gross per 24 hour   Intake --   Output 200 ml   Net -200 ml         Physical Exam:     Blood pressure (!) 141/77, pulse 65, temperature 98.6 °F (37 °C), resp. rate 15, SpO2 99 %. General:    Calm, confused at baseline, minimally verbal, NAD  HEENT:           Head NCAT, PERRLA+, MM dry  Neck:  Trachea grossly midline, neck non-tender to manipulation  CV:   RRR. No m/r/g. No jugular venous distension. Lungs:   CTAB/L  Abdomen: Bowel sounds present.   Soft, nontender, nondistended. Extremities: No tenderness to palpation RLE; bandage clean no gross bleeding  Skin:     No rashes and normal coloration. Warm and dry. Neuro:  Moving all 4 ext spont, follows commands intermittently  Psych:  Affect flat    I have reviewed ordered lab tests and independently visualized imaging below:    Recent Labs:  Recent Results (from the past 48 hour(s))   CBC WITH AUTOMATED DIFF    Collection Time: 12/20/21  9:32 AM   Result Value Ref Range    WBC 4.5 4.3 - 11.1 K/uL    RBC 2.80 (L) 4.05 - 5.2 M/uL    HGB 8.2 (L) 11.7 - 15.4 g/dL    HCT 24.8 (L) 35.8 - 46.3 %    MCV 88.6 79.6 - 97.8 FL    MCH 29.3 26.1 - 32.9 PG    MCHC 33.1 31.4 - 35.0 g/dL    RDW 13.1 11.9 - 14.6 %    PLATELET 439 (L) 441 - 450 K/uL    MPV 9.8 9.4 - 12.3 FL    ABSOLUTE NRBC 0.00 0.0 - 0.2 K/uL    NEUTROPHILS 66 43 - 78 %    LYMPHOCYTES 17 13 - 44 %    MONOCYTES 8 4.0 - 12.0 %    EOSINOPHILS 3 0.5 - 7.8 %    BASOPHILS 0 0.0 - 2.0 %    IMMATURE GRANULOCYTES 6 (H) 0.0 - 5.0 %    ABS. NEUTROPHILS 2.9 1.7 - 8.2 K/UL    ABS. LYMPHOCYTES 0.8 0.5 - 4.6 K/UL    ABS. MONOCYTES 0.4 0.1 - 1.3 K/UL    ABS. EOSINOPHILS 0.1 0.0 - 0.8 K/UL    ABS. BASOPHILS 0.0 0.0 - 0.2 K/UL    ABS. IMM.  GRANS. 0.3 0.0 - 0.5 K/UL    RBC COMMENTS SLIGHT  ANISOCYTOSIS        WBC COMMENTS Result Confirmed By Smear      PLATELET COMMENTS SLIGHT      DF AUTOMATED     METABOLIC PANEL, BASIC    Collection Time: 12/20/21  9:32 AM   Result Value Ref Range    Sodium 144 136 - 145 mmol/L    Potassium 3.4 (L) 3.5 - 5.1 mmol/L    Chloride 107 98 - 107 mmol/L    CO2 33 (H) 21 - 32 mmol/L    Anion gap 4 (L) 7 - 16 mmol/L    Glucose 82 65 - 100 mg/dL    BUN 19 8 - 23 MG/DL    Creatinine 0.35 (L) 0.6 - 1.0 MG/DL    GFR est AA >60 >60 ml/min/1.73m2    GFR est non-AA >60 >60 ml/min/1.73m2    Calcium 8.6 8.3 - 10.4 MG/DL       All Micro Results     Procedure Component Value Units Date/Time    CULTURE, URINE [[id number]] Collected: 12/16/21 0534    Order Status: Completed Specimen: Cath Urine Updated: 12/18/21 0934     Special Requests: NO SPECIAL REQUESTS        Culture result:       >100,000 COLONIES/mL MIXED SKIN AL ISOLATED                  THREE OR MORE TYPES OF ORGANISMS ARE PRESENT. THIS IS INDICATIVE OF CONTAMINATION DUE TO IMPROPER COLLECTION TECHNIQUE. PLEASE REPEAT COLLECTION UNLESS PATIENT HAS STARTED ANTIBIOTIC TREATMENT. MSSA/MRSA SC BY PCR, NASAL SWAB [[id number]] Collected: 12/16/21 0446    Order Status: Completed Specimen: Nasal swab Updated: 12/16/21 0706     Special Requests: NO SPECIAL REQUESTS        Culture result:       SA target not detected. A MRSA NEGATIVE, SA NEGATIVE test result does not preclude MRSA or SA nasal colonization. COVID-19 RAPID TEST [[id number]] Collected: 12/16/21 0229    Order Status: Completed Specimen: Nasopharyngeal Updated: 12/16/21 0309     Specimen source NOT SPECIFIED        COVID-19 rapid test Not detected        Comment:      The specimen is NEGATIVE for SARS-CoV-2, the novel coronavirus associated with COVID-19. A negative result does not rule out COVID-19. This test has been authorized by the FDA under an Emergency Use Authorization (EUA) for use by authorized laboratories. Fact sheet for Healthcare Providers: iTendency.uy  Fact sheet for Patients: iTendency.uy       Methodology: Isothermal Nucleic Acid Amplification               Other Studies:  No results found.     Current Meds:  Current Facility-Administered Medications   Medication Dose Route Frequency    0.9% sodium chloride infusion 250 mL  250 mL IntraVENous PRN    QUEtiapine (SEROquel) tablet 50 mg  50 mg Oral QHS    cefpodoxime (VANTIN) tablet 200 mg  200 mg Oral Q12H    lactated Ringers infusion  50 mL/hr IntraVENous CONTINUOUS    lidocaine (XYLOCAINE) 10 mg/mL (1 %) injection 0.1 mL  0.1 mL SubCUTAneous PRN    lip protectant (BLISTEX) ointment 1 Each  1 Each Topical PRN    morphine injection 2 mg  2 mg IntraVENous Q1H PRN    sodium chloride (NS) flush 5-40 mL  5-40 mL IntraVENous Q8H    sodium chloride (NS) flush 5-40 mL  5-40 mL IntraVENous PRN    alum-mag hydroxide-simeth (MYLANTA) oral suspension 30 mL  30 mL Oral Q4H PRN    calcium-vitamin D (OS-FLEICE +D3) 500 mg-200 unit per tablet 1 Tablet  1 Tablet Oral TID WITH MEALS    aspirin delayed-release tablet 325 mg  325 mg Oral DAILY    metoprolol tartrate (LOPRESSOR) tablet 25 mg  25 mg Oral Q12H    sodium chloride (NS) flush 5-40 mL  5-40 mL IntraVENous Q8H    oxyCODONE IR (ROXICODONE) tablet 5 mg  5 mg Oral Q4H PRN    lisinopriL (PRINIVIL, ZESTRIL) tablet 40 mg  40 mg Oral DAILY    divalproex (DEPAKOTE SPRINKLE) capsule 125 mg  125 mg Oral BID       Signed:  Ravi Pascual MD    Part of this note may have been written by using a voice dictation software. The note has been proof read but may still contain some grammatical/other typographical errors.

## 2021-12-21 NOTE — PROGRESS NOTES
ACUTE PHYSICAL THERAPY GOALS:  (Developed with and agreed upon by patient and/or caregiver. )  LTG:  (1.)Ms. Arielle So will move from supine to sit and sit to supine , scoot up and down and roll side to side in bed with CONTACT GUARD ASSIST within 7 treatment day(s). (2.)Ms. Arielle oS will transfer from bed to chair and chair to bed with CONTACT GUARD ASSIST using the least restrictive device within 7 treatment day(s). (3.)Ms. Arielle So will ambulate with CONTACT GUARD ASSIST for 100+ feet with the least restrictive device within 7 treatment day(s). PHYSICAL THERAPY: Daily Note and PM Treatment Day # 3     WBAT    Minor Courts is a 80 y.o. female   PRIMARY DIAGNOSIS: Closed right hip fracture (HCC)  Closed right hip fracture (HCC) [S72.001A]  Procedure(s) (LRB):  RIGHT GAMMA NAIL INSERTION (Right)  4 Days Post-Op    ASSESSMENT:     REHAB RECOMMENDATIONS: CURRENT LEVEL OF FUNCTION:  (Most Recently Demonstrated)   Recommendation to date pending progress:  Setting:   Short-term Rehab  Equipment:    To Be Determined Bed Mobility:   Maximal Assistance x 2  Sit to Stand:   Moderate Assistance x 2  Transfers:   Maximal Assistance x 2  Gait/Mobility:   Not tested     ASSESSMENT:  Ms. Arielle So was supine with severe dementia. With encouragement she is able to come sit EOB with mod A x2 and perform sit to stand from elevated bed. Pt with decreased ability to follow commands and does not want to move when she begins to feel pain. Cries out throughout session and then refuses to perform ADLs with OT. Pt unstable in standing and needs assist for increased support and improved safety with mobility. Seated up in bedside chair, asking to go to bed. Pt mod A x2 for s2s and max A x2 for transfer back to bed. Once in bed she is max to total A for scooting due to decreased ability to follow commands and increased pain. PT will continue to follow. SUBJECTIVE:   Ms. Arielle So states, \"Yes. \"    SOCIAL HISTORY/ LIVING ENVIRONMENT:   Home Environment: Skilled nursing facility (memory care)  One/Two Story Residence: One story  Support Systems: Assisted Living,Child(carlos eduardo)  OBJECTIVE:     PAIN: VITAL SIGNS: LINES/DRAINS:   Pre Treatment: Pain Screen  Pain Scale 1: Numeric (0 - 10)  Pain Intensity 1: 0  Post Treatment: 0   none  O2 Device: None (Room air)     MOBILITY: I Mod I S SBA CGA Min Mod Max Total  NT x2 Comments:   Bed Mobility    Rolling [] [] [] [] [] [] [] [] [] [x] []    Supine to Sit [] [] [] [] [] [] [] [] [] [x] []    Scooting [] [] [] [] [] [] [] [] [x] [] [x]    Sit to Supine [] [] [] [] [] [] [] [x] [x] [] [x]    Transfers    Sit to Stand [] [] [] [] [] [] [x] [] [] [] [x]    Bed to Chair [] [] [] [] [] [] [x] [x] [] [] [x]    Stand to Sit [] [] [] [] [] [] [x] [] [] [] [x]    I=Independent, Mod I=Modified Independent, S=Supervision, SBA=Standby Assistance, CGA=Contact Guard Assistance,   Min=Minimal Assistance, Mod=Moderate Assistance, Max=Maximal Assistance, Total=Total Assistance, NT=Not Tested    BALANCE: Good Fair+ Fair Fair- Poor NT Comments   Sitting Static [] [x] [] [] [] []    Sitting Dynamic [] [] [x] [] [] []              Standing Static [] [] [] [x] [] []    Standing Dynamic [] [] [] [] [x] []      GAIT: I Mod I S SBA CGA Min Mod Max Total  NT x2 Comments:   Level of Assistance [] [] [] [] [] [] [] [] [] [x] []    Distance     DME N/A    Gait Quality     Weightbearing  Status WBAT     I=Independent, Mod I=Modified Independent, S=Supervision, SBA=Standby Assistance, CGA=Contact Guard Assistance,   Min=Minimal Assistance, Mod=Moderate Assistance, Max=Maximal Assistance, Total=Total Assistance, NT=Not Tested    PLAN:   FREQUENCY/DURATION: PT Plan of Care: BID for duration of hospital stay or until stated goals are met, whichever comes first.  TREATMENT:     TREATMENT:   ($$ Therapeutic Activity: 8-22 mins    )  Therapeutic Activity (14 Minutes):  Therapeutic activity included Sit to Supine, Scooting, Transfer Training, Sitting balance  and Standing balance to improve functional Mobility, Strength and Activity tolerance.     TREATMENT GRID:  N/A    AFTER TREATMENT POSITION/PRECAUTIONS:  Bed, Needs within reach, Restraints , RN notified and Visitors at bedside    INTERDISCIPLINARY COLLABORATION:  RN/PCT, PT/PTA and Rehab Attendant     TOTAL TREATMENT DURATION:  PT Patient Time In/Time Out  Time In: 7767  Time Out: [address], [place]

## 2021-12-22 VITALS
DIASTOLIC BLOOD PRESSURE: 72 MMHG | HEART RATE: 59 BPM | RESPIRATION RATE: 18 BRPM | TEMPERATURE: 98 F | SYSTOLIC BLOOD PRESSURE: 121 MMHG | OXYGEN SATURATION: 95 %

## 2021-12-22 LAB
ANION GAP SERPL CALC-SCNC: 5 MMOL/L (ref 7–16)
BUN SERPL-MCNC: 15 MG/DL (ref 8–23)
CALCIUM SERPL-MCNC: 9.1 MG/DL (ref 8.3–10.4)
CHLORIDE SERPL-SCNC: 107 MMOL/L (ref 98–107)
CO2 SERPL-SCNC: 31 MMOL/L (ref 21–32)
COVID-19 RAPID TEST, COVR: NOT DETECTED
CREAT SERPL-MCNC: 0.46 MG/DL (ref 0.6–1)
GLUCOSE SERPL-MCNC: 89 MG/DL (ref 65–100)
POTASSIUM SERPL-SCNC: 3.7 MMOL/L (ref 3.5–5.1)
SODIUM SERPL-SCNC: 143 MMOL/L (ref 136–145)
SOURCE, COVRS: NORMAL

## 2021-12-22 PROCEDURE — 87635 SARS-COV-2 COVID-19 AMP PRB: CPT

## 2021-12-22 PROCEDURE — 36415 COLL VENOUS BLD VENIPUNCTURE: CPT

## 2021-12-22 PROCEDURE — 74011250637 HC RX REV CODE- 250/637: Performed by: ORTHOPAEDIC SURGERY

## 2021-12-22 PROCEDURE — 74011250637 HC RX REV CODE- 250/637: Performed by: PHYSICIAN ASSISTANT

## 2021-12-22 PROCEDURE — 80048 BASIC METABOLIC PNL TOTAL CA: CPT

## 2021-12-22 PROCEDURE — 77030040832 HC IRR TRAY MDII -A

## 2021-12-22 RX ORDER — ASPIRIN 325 MG
325 TABLET, DELAYED RELEASE (ENTERIC COATED) ORAL DAILY
Qty: 30 TABLET | Refills: 1 | Status: SHIPPED
Start: 2021-12-22 | End: 2022-01-22

## 2021-12-22 RX ORDER — OXYCODONE HYDROCHLORIDE 5 MG/1
5 TABLET ORAL
Qty: 12 TABLET | Refills: 0 | Status: SHIPPED | OUTPATIENT
Start: 2021-12-22 | End: 2021-12-25

## 2021-12-22 RX ORDER — METOPROLOL TARTRATE 25 MG/1
25 TABLET, FILM COATED ORAL EVERY 12 HOURS
Qty: 60 TABLET | Refills: 2 | Status: SHIPPED
Start: 2021-12-22 | End: 2022-01-21

## 2021-12-22 RX ADMIN — Medication 10 ML: at 05:53

## 2021-12-22 RX ADMIN — DIVALPROEX SODIUM 125 MG: 125 CAPSULE ORAL at 08:47

## 2021-12-22 RX ADMIN — CEFPODOXIME PROXETIL 200 MG: 200 TABLET, FILM COATED ORAL at 08:47

## 2021-12-22 RX ADMIN — METOPROLOL TARTRATE 25 MG: 25 TABLET, FILM COATED ORAL at 08:47

## 2021-12-22 RX ADMIN — ASPIRIN 325 MG: 325 TABLET, COATED ORAL at 08:46

## 2021-12-22 RX ADMIN — LISINOPRIL 40 MG: 20 TABLET ORAL at 08:47

## 2021-12-22 NOTE — PROGRESS NOTES
TRANSFER - OUT REPORT:    Verbal report given to Mary(name) on Gt Dong  being transferred to [facility](unit) for routine progression of care       Report consisted of patients Situation, Background, Assessment and   Recommendations(SBAR). Information from the following report(s) SBAR and Kardex was reviewed with the receiving nurse. Lines:   Peripheral IV 12/19/21 Right Arm (Active)   Site Assessment Clean, dry, & intact 12/22/21 0740   Phlebitis Assessment 0 12/22/21 0740   Infiltration Assessment 0 12/22/21 0740   Dressing Status Clean, dry, & intact 12/22/21 0740   Dressing Type Transparent;Tape 12/22/21 0740   Hub Color/Line Status Blue 12/22/21 0740   Action Taken Open ports on tubing capped 12/20/21 1930   Alcohol Cap Used No 12/20/21 1930        Opportunity for questions and clarification was provided.       Patient transported with:   591wed

## 2021-12-22 NOTE — PROGRESS NOTES
Patient medically ready for discharge. Patient will discharge to room 322A at [facility] at 1300 via [address] transport. Patient will need rapid Covid testing ordered prior to discharge. Primary RN will call report to [phone]. Daughter, Yoko Kaur, updated over phone. Care Management Interventions  PCP Verified by CM: Yes  Mode of Transport at Discharge: [address]  [address] Time of Discharge: 1300  Transition of Care Consult (CM Consult): Discharge Planning,SNF  Partner SNF: Yes  Support Systems: Child(carlos eduardo),Assisted Living  Confirm Follow Up Transport: Family  The Plan for Transition of Care is Related to the Following Treatment Goals : Patient will participate in STR therapie sin order to return to safe baseline independence in ADLs.   The Patient and/or Patient Representative was Provided with a Choice of Provider and Agrees with the Discharge Plan?: Yes  Name of the Patient Representative Who was Provided with a Choice of Provider and Agrees with the Discharge Plan: Daughter  Freedom of Choice List was Provided with Basic Dialogue that Supports the Patient's Individualized Plan of Care/Goals, Treatment Preferences and Shares the Quality Data Associated with the Providers?: Yes  Discharge Location  Discharge Placement: Rehab Unit Subacute

## 2021-12-22 NOTE — DISCHARGE SUMMARY
Hospitalist Discharge Summary     Patient ID:  Raul Herrmann  [id number]  85 y.o.  1940  Admit date: 12/15/2021  3:57 PM  Discharge date and time: 12/22/2021  Attending: Stephanie Montanez MD  PCP:  Radha Sibley MD  Treatment Team: Attending Provider: Stephanie Montanez MD; Consulting Provider: Roque Chisholm MD; Care Manager: Umesh Holloway, RN; Consulting Provider: Abhijit Rush MD; Primary Nurse: Campos Jung RN; Charge Nurse: Isaiah Barba; Physical Therapy Assistant: Kevin Sanabria; Occupational Therapy Assistant: CHACHA Hess    Principal Diagnosis Closed right hip fracture [facility])   Principal Problem:    Closed right hip fracture ([address] (12/15/2021)    Active Problems:    Psychiatric disorder ()      Acute postoperative anemia due to expected blood loss (12/19/2021)      Early onset Alzheimer's dementia ([facility] [address] (12/19/2021)       Isabel Sin is a 80 y.o. female with medical history of severe dementia who presented from [place] [address] in memory unit after SNF s/p fall hitting her head associated with R leg pain.  Unclear what happened but per EMS, patient fell backward and struck her head but did not have LOC.  At bedside, patient was pleasantly demented, alert but not oriented to person, place or time.  Stated that she was \"doing okay. \"     In ED, VSS.  CT head and CT C-spine were unremarkable.  CXR unremarkable.  Hip x-ray shows comminuted intertrochanteric fracture of the proximal right femur.  Labs on admission only notable for K3.0.        Hospital Course:  Principal Problem:  12/22:    Closed right hip fracture ([address] (12/15/2021)  - POD#5, s/p right gamma nail insertion  - needs STR on dc   mg po daily, EOT 1/21/22        Active Problems:    Hypokalemia, resolved  12/22:  3.7       Uncontrolled HTN  - cont lisinopril / metoprolol (newly started)  - acceptable control       UTI  - 12/16/2021 urine cx mixed tariq  - completed vantin total 7-day course (EOT 12/22/2021)       ABLA (post surgery)  - hgb drop 13.3 ~> 9.8 ~> 8.7 --> 7.4  12/20:  Hb 8.2, s/p 1 unit PRBC on 12/19       Severe dementia  12/22:  Stopped namenda and aricept  Likely contributing to urinary retention, now resolved       Debility  - HIGH RISK FOR RECURRENT FALLS  - PT/OT eval pending          Dispo/Discharge Planning:    Pt to be discharged to [facility] today as she is medically cleared. I have discussed with goals of care with pt's daughter, pt is DNR/DNI. Should patient decline clinically at rehab, pt's daughter would prefer the pt to be transitioned to Hospice and be transferred to [facility]. Significant Diagnostic Studies:   EXAM: Ultrasound of the kidneys.     INDICATION: Urinary retention.     COMPARISON: None.     TECHNIQUE: A standard protocol kidney ultrasound was performed.     FINDINGS: Both kidneys have a normal ultrasound appearance, each measuring 11.6  cm in length. No kidney stone, hydronephrosis or perinephric fluid is seen. There appears to be a large diverticulum along the left margin of the urinary  bladder. The aorta and IVC are patent.     IMPRESSION  1. Normal bilateral kidneys. 2. Large bladder diverticulum. EXAM:  XR FEMUR RT 2 VS     INDICATION:   Fracture     COMPARISON: None.     FINDINGS: An AP view of the pelvis and a frogleg lateral view of the right hip  demonstrate a segment of the gamma nail in the right femur reducing an  intertrochanteric fracture. .       IMPRESSION  Unremarkable gamma nail placement.     RIGHT HIP AND FEMUR RADIOGRAPHS, 12/15/2021.     CLINICAL HISTORY: Fall with pain.     COMPARISON STUDY: None.     FINDINGS:  RIGHT HIP:  A frontal view of the pelvis and AP and lateral view of the right hip are  submitted for evaluation. The sacroiliac joints and pubic symphysis are intact. The pelvic ring is grossly intact.       AP and lateral views of the right hip show a dislocation of the femoral head.   However, there is a comminuted intertrochanteric fracture of the proximal right  femur. There is mild proximal migration of the major distal fracture fragment  with a secondary mild varus deformity about the fracture site.      RIGHT FEMUR:  AP and lateral views of the right femur once again demonstrate the proximal  right femoral fracture. No additional fracture of the right femur is seen. A  right total knee replacement is present.     IMPRESSION  1. Comminuted intertrochanteric fracture of the proximal right femur as  described above. CHEST X-RAY, single portable view  12/15/2021     History: Hip fracture protocol.     Technique: Single frontal view of the chest.     Comparison: None.     Findings: The cardiac silhouette is normal in respect to size. The aorta is tortuous and  ectatic most commonly resulting from chronic hypertension. The lungs are  expanded without evidence for pneumothorax. No consolidative airspace process  or pleural effusion is seen. Postsurgical changes are seen in the proximal left  humerus and chronic deformity is seen of the proximal right humerus.     IMPRESSION  1.   No acute cardiopulmonary process evident on single frontal view of the  Chest.      EXAMINATION: CT HEAD WO CONT, CT SPINE CERV WO CONT 12/15/2021 4:42 PM      ACCESSION NUMBER:  [id number], [id number]     INDICATION:  fall fall hitting head no LOC denies neck and back pain about 45  mins ago.     COMPARISON: None available     TECHNIQUE: Contiguous axial computed tomographic images were obtained of the  head and cervical spine without intravenous contrast. Multiplanar  reconstructions were performed.     Radiation dose reduction techniques were used for this study:  Our CT scanners  use one or all of the following: Automated exposure control, adjustment of the  mA and/or kVp according to patient's size, iterative reconstruction.     FINDINGS:     HEAD CT:     The ventricles are within normal limits for the degree of global brain  parenchymal volume loss. There is no midline shift. The basilar cisterns are  patent. There is no cerebellar tonsillar ectopia or herniation.      Hypodensities in the periventricular and subcortical white matter are  nonspecific, but they are most likely to represent chronic microangiopathy.      Prior bilateral lens replacement.     There is no evidence of acute intracranial hemorrhage or extra-axial  collections.     There is no CT evidence of acute infarction.      No evidence of skull fracture.     Left humerus internal fixation on the topogram.        CERVICAL SPINE CT:     CRANIOCERVICAL ARTICULATION: Normally aligned.     VERTEBRAL BODY ALIGNMENT: Slight retrolisthesis of C3 on C4. Minimal  anterolisthesis of C5 on C6. Slight retrolisthesis of C6 on C7.     POSTERIOR ELEMENTS: Normally aligned     PREVERTEBRAL SOFT TISSUES: Normal in thickness.     VERTEBRAL BODY HEIGHTS: There is mild anterior height loss at C5 and C6. There  is sclerosis and cystic change throughout the C4 vertebral body.     DEGENERATIVE FINDINGS: C6-C7 intervertebral disc space narrowing. C5-C6 and  C6-C7 uncovertebral joint hypertrophy. Scattered facet arthropathy. Nuchal  ligamentous ossification.     INCLUDED PORTIONS OF THE SKULL BASE AND MANDIBLE: No fracture within the  field-of-view.     CERVICAL SPINE ACUTE FINDINGS: No evidence of acute cervical spine fracture.     CERVICAL SPINAL CANAL: No high-grade cervical spinal canal stenosis within the  limits of noncontrast CT.     INCLUDED NECK SOFT TISSUES: Carotid bifurcation atherosclerosis bilaterally.     INCLUDED LUNG APICES: Predominantly clear.        IMPRESSION     1. No evidence of acute intracranial abnormality.     2. No evidence of acute cervical spine fracture.     3. There is a mixed sclerotic and lucent appearance of the C4 vertebral body. This is favored to be a benign process such as a hemangioma.   Labs: Results:       Chemistry Recent Labs     12/22/21  5197 12/21/21  1759 12/20/21  0932   GLU 89 109* 82    140 144   K 3.7 3.6 3.4*    104 107   CO2 31 30 33*   BUN 15 18 19   CREA 0.46* 0.57* 0.35*   CA 9.1 9.1 8.6   AGAP 5* 6* 4*      CBC w/Diff Recent Labs     12/20/21  0932   WBC 4.5   RBC 2.80*   HGB 8.2*   HCT 24.8*   *   GRANS 66   LYMPH 17   EOS 3      Cardiac Enzymes No results for input(s): CPK, CKND1, AMMON in the last 72 hours. No lab exists for component: CKRMB, TROIP   Coagulation No results for input(s): PTP, INR, APTT, INREXT in the last 72 hours. Lipid Panel No results found for: CHOL, CHOLPOCT, CHOLX, CHLST, CHOLV, 872456, HDL, HDLP, LDL, LDLC, DLDLP, 198680, VLDLC, VLDL, TGLX, TRIGL, TRIGP, TGLPOCT, CHHD, CHHDX   BNP No results for input(s): BNPP in the last 72 hours. Liver Enzymes No results for input(s): TP, ALB, TBIL, AP in the last 72 hours. No lab exists for component: SGOT, GPT, DBIL   Thyroid Studies No results found for: T4, T3U, TSH, TSHEXT         Discharge Exam:  Visit Vitals  /72 (BP 1 Location: Left upper arm, BP Patient Position: At rest)   Pulse 63   Temp 98.3 °F (36.8 °C)   Resp 18   SpO2 95%     General appearance: alert, awake, pleasantly confused, NAD, resting in bed  Lungs: clear to auscultation bilaterally  Heart: regular rate and rhythm, S1, S2 normal, no murmur, click, rub or gallop  Abdomen: soft, non-tender. Bowel sounds normal. No masses,  no organomegaly  Extremities: right hip dressing, C/D/I  Neurologic:moving all 4 ext spont, following commands intermittently  Psych: flat affect, difficult to assess due to pt's underlying confusion    Disposition: SNF  Discharge Condition: stable  Patient Instructions:   Current Discharge Medication List      START taking these medications    Details   aspirin delayed-release 325 mg tablet Take 1 Tablet by mouth daily for 31 doses.   Qty: 30 Tablet, Refills: 1  Start date: 12/22/2021, End date: 1/22/2022      metoprolol tartrate (LOPRESSOR) 25 mg tablet Take 1 Tablet by mouth every twelve (12) hours for 30 days. Qty: 60 Tablet, Refills: 2  Start date: 12/22/2021, End date: 1/21/2022      oxyCODONE IR (ROXICODONE) 5 mg immediate release tablet Take 1 Tablet by mouth every four (4) hours as needed for Pain for up to 3 days. Max Daily Amount: 30 mg.  Qty: 12 Tablet, Refills: 0  Start date: 12/22/2021, End date: 12/25/2021    Associated Diagnoses: Closed fracture of right hip, initial encounter ([facility] [address]         CONTINUE these medications which have NOT CHANGED    Details   divalproex (DEPAKOTE SPRINKLE) 125 mg capsule Take 125 mg by mouth two (2) times a day. QUEtiapine (SEROQUEL) 50 mg tablet Take 50 mg by mouth daily. lisinopril (PRINIVIL, ZESTRIL) 20 mg tablet Take  by mouth daily. risperiDONE microspheres (RISPERDAL CONSTA) 50 mg/2 mL injection 50 mg by IntraMUSCular route Twice a month.          STOP taking these medications       cyanocobalamin 1,000 mcg tablet Comments:   Reason for Stopping:         mirtazapine (REMERON) 30 mg tablet Comments:   Reason for Stopping:         memantine (NAMENDA) 10 mg tablet Comments:   Reason for Stopping:         donepezil (ARICEPT) 10 mg tablet Comments:   Reason for Stopping:               Activity: PT/OT Eval and Treat  Diet: Regular Diet  Wound Care: As directed    Follow-up  ·   Follow up with physician at [address] [place] [address]  · Follow up with Ortho in 2 weeks as scheduled  Time spent to discharge patient 35 minutes  Signed:  Lizabeth Chang MD  12/22/2021  8:16 AM

## 2021-12-30 ENCOUNTER — HOSPITAL ENCOUNTER (OUTPATIENT)
Dept: LAB | Age: 81
Discharge: HOME | End: 2021-12-30

## 2021-12-30 LAB
ANION GAP SERPL CALC-SCNC: 5 MMOL/L (ref 7–16)
BASOPHILS # BLD: 0 K/UL (ref 0–0.2)
BASOPHILS NFR BLD: 0 % (ref 0–2)
BUN SERPL-MCNC: 25 MG/DL (ref 8–23)
CALCIUM SERPL-MCNC: 9.6 MG/DL (ref 8.3–10.4)
CHLORIDE SERPL-SCNC: 109 MMOL/L (ref 98–107)
CO2 SERPL-SCNC: 29 MMOL/L (ref 21–32)
CREAT SERPL-MCNC: 0.61 MG/DL (ref 0.6–1)
DIFFERENTIAL METHOD BLD: ABNORMAL
EOSINOPHIL # BLD: 0 K/UL (ref 0–0.8)
EOSINOPHIL NFR BLD: 0 % (ref 0.5–7.8)
ERYTHROCYTE [DISTWIDTH] IN BLOOD BY AUTOMATED COUNT: 14 % (ref 11.9–14.6)
GLUCOSE SERPL-MCNC: 93 MG/DL (ref 65–100)
HCT VFR BLD AUTO: 29.5 % (ref 35.8–46.3)
HGB BLD-MCNC: 9.1 G/DL (ref 11.7–15.4)
IMM GRANULOCYTES # BLD AUTO: 0.1 K/UL (ref 0–0.5)
IMM GRANULOCYTES NFR BLD AUTO: 2 % (ref 0–5)
LYMPHOCYTES # BLD: 0.6 K/UL (ref 0.5–4.6)
LYMPHOCYTES NFR BLD: 8 % (ref 13–44)
MCH RBC QN AUTO: 28.7 PG (ref 26.1–32.9)
MCHC RBC AUTO-ENTMCNC: 30.8 G/DL (ref 31.4–35)
MCV RBC AUTO: 93.1 FL (ref 79.6–97.8)
MONOCYTES # BLD: 0.3 K/UL (ref 0.1–1.3)
MONOCYTES NFR BLD: 5 % (ref 4–12)
NEUTS SEG # BLD: 5.6 K/UL (ref 1.7–8.2)
NEUTS SEG NFR BLD: 84 % (ref 43–78)
NRBC # BLD: 0 K/UL (ref 0–0.2)
PLATELET # BLD AUTO: 299 K/UL (ref 150–450)
PMV BLD AUTO: 9.9 FL (ref 9.4–12.3)
POTASSIUM SERPL-SCNC: 3.4 MMOL/L (ref 3.5–5.1)
RBC # BLD AUTO: 3.17 M/UL (ref 4.05–5.2)
SODIUM SERPL-SCNC: 143 MMOL/L (ref 136–145)
WBC # BLD AUTO: 6.7 K/UL (ref 4.3–11.1)

## 2021-12-30 PROCEDURE — 85025 COMPLETE CBC W/AUTO DIFF WBC: CPT

## 2021-12-30 PROCEDURE — 80048 BASIC METABOLIC PNL TOTAL CA: CPT

## 2022-02-24 ENCOUNTER — HOSPITAL ENCOUNTER (INPATIENT)
Age: 82
LOS: 2 days | Discharge: NURSING, REHAB OR LONG-TERM CARE | DRG: 481 | End: 2022-02-26
Attending: EMERGENCY MEDICINE | Admitting: FAMILY MEDICINE
Payer: MEDICARE

## 2022-02-24 ENCOUNTER — ANESTHESIA (OUTPATIENT)
Dept: SURGERY | Age: 82
DRG: 481 | End: 2022-02-24
Payer: MEDICARE

## 2022-02-24 ENCOUNTER — ANESTHESIA EVENT (OUTPATIENT)
Dept: SURGERY | Age: 82
DRG: 481 | End: 2022-02-24
Payer: MEDICARE

## 2022-02-24 ENCOUNTER — APPOINTMENT (OUTPATIENT)
Dept: GENERAL RADIOLOGY | Age: 82
DRG: 481 | End: 2022-02-24
Attending: EMERGENCY MEDICINE
Payer: MEDICARE
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
## 2022-02-24 DIAGNOSIS — S72.142A CLOSED COMMINUTED INTERTROCHANTERIC FRACTURE OF LEFT FEMUR, INITIAL ENCOUNTER (HCC): Primary | ICD-10-CM

## 2022-02-24 PROBLEM — I10 HTN (HYPERTENSION): Status: ACTIVE | Noted: 2022-02-24

## 2022-02-24 LAB
ABO + RH BLD: NORMAL
ALBUMIN SERPL-MCNC: 3.5 G/DL (ref 3.2–4.6)
ALBUMIN/GLOB SERPL: 1 {RATIO} (ref 1.2–3.5)
ALP SERPL-CCNC: 169 U/L (ref 50–136)
ALT SERPL-CCNC: 21 U/L (ref 12–65)
ANION GAP SERPL CALC-SCNC: 2 MMOL/L (ref 7–16)
APPEARANCE UR: CLEAR
AST SERPL-CCNC: 13 U/L (ref 15–37)
ATRIAL RATE: 103 BPM
BASOPHILS # BLD: 0 K/UL (ref 0–0.2)
BASOPHILS NFR BLD: 0 % (ref 0–2)
BILIRUB SERPL-MCNC: 0.4 MG/DL (ref 0.2–1.1)
BILIRUB UR QL: NEGATIVE
BLOOD GROUP ANTIBODIES SERPL: NORMAL
BUN SERPL-MCNC: 33 MG/DL (ref 8–23)
CALCIUM SERPL-MCNC: 9.6 MG/DL (ref 8.3–10.4)
CALCULATED P AXIS, ECG09: 41 DEGREES
CALCULATED R AXIS, ECG10: -69 DEGREES
CALCULATED T AXIS, ECG11: 55 DEGREES
CHLORIDE SERPL-SCNC: 110 MMOL/L (ref 98–107)
CO2 SERPL-SCNC: 28 MMOL/L (ref 21–32)
COLOR UR: YELLOW
COVID-19 RAPID TEST, COVR: NOT DETECTED
CREAT SERPL-MCNC: 0.6 MG/DL (ref 0.6–1)
DIAGNOSIS, 93000: NORMAL
DIFFERENTIAL METHOD BLD: ABNORMAL
EOSINOPHIL # BLD: 0 K/UL (ref 0–0.8)
EOSINOPHIL NFR BLD: 0 % (ref 0.5–7.8)
ERYTHROCYTE [DISTWIDTH] IN BLOOD BY AUTOMATED COUNT: 14.1 % (ref 11.9–14.6)
GLOBULIN SER CALC-MCNC: 3.4 G/DL (ref 2.3–3.5)
GLUCOSE SERPL-MCNC: 158 MG/DL (ref 65–100)
GLUCOSE UR STRIP.AUTO-MCNC: NEGATIVE MG/DL
HCT VFR BLD AUTO: 34.4 % (ref 35.8–46.3)
HGB BLD-MCNC: 10.7 G/DL (ref 11.7–15.4)
HGB UR QL STRIP: NEGATIVE
IMM GRANULOCYTES # BLD AUTO: 0.1 K/UL (ref 0–0.5)
IMM GRANULOCYTES NFR BLD AUTO: 1 % (ref 0–5)
INR PPP: 1.1
KETONES UR QL STRIP.AUTO: NEGATIVE MG/DL
LEUKOCYTE ESTERASE UR QL STRIP.AUTO: NEGATIVE
LYMPHOCYTES # BLD: 0.5 K/UL (ref 0.5–4.6)
LYMPHOCYTES NFR BLD: 5 % (ref 13–44)
MCH RBC QN AUTO: 29.2 PG (ref 26.1–32.9)
MCHC RBC AUTO-ENTMCNC: 31.1 G/DL (ref 31.4–35)
MCV RBC AUTO: 93.7 FL (ref 79.6–97.8)
MONOCYTES # BLD: 0.5 K/UL (ref 0.1–1.3)
MONOCYTES NFR BLD: 6 % (ref 4–12)
NEUTS SEG # BLD: 7.6 K/UL (ref 1.7–8.2)
NEUTS SEG NFR BLD: 88 % (ref 43–78)
NITRITE UR QL STRIP.AUTO: NEGATIVE
NRBC # BLD: 0 K/UL (ref 0–0.2)
P-R INTERVAL, ECG05: 217 MS
PH UR STRIP: 6 [PH] (ref 5–9)
PLATELET # BLD AUTO: 187 K/UL (ref 150–450)
PMV BLD AUTO: 9.7 FL (ref 9.4–12.3)
POTASSIUM SERPL-SCNC: 3.7 MMOL/L (ref 3.5–5.1)
PROT SERPL-MCNC: 6.9 G/DL (ref 6.3–8.2)
PROT UR STRIP-MCNC: NEGATIVE MG/DL
PROTHROMBIN TIME: 14.3 SEC (ref 12.6–14.5)
Q-T INTERVAL, ECG07: 359 MS
QRS DURATION, ECG06: 98 MS
QTC CALCULATION (BEZET), ECG08: 466 MS
RBC # BLD AUTO: 3.67 M/UL (ref 4.05–5.2)
SODIUM SERPL-SCNC: 140 MMOL/L (ref 136–145)
SOURCE, COVRS: NORMAL
SP GR UR REFRACTOMETRY: 1.03 (ref 1–1.02)
SPECIMEN EXP DATE BLD: NORMAL
UROBILINOGEN UR QL STRIP.AUTO: 1 EU/DL (ref 0.2–1)
VENTRICULAR RATE, ECG03: 101 BPM
WBC # BLD AUTO: 8.6 K/UL (ref 4.3–11.1)

## 2022-02-24 PROCEDURE — 99285 EMERGENCY DEPT VISIT HI MDM: CPT

## 2022-02-24 PROCEDURE — 86580 TB INTRADERMAL TEST: CPT | Performed by: FAMILY MEDICINE

## 2022-02-24 PROCEDURE — 71045 X-RAY EXAM CHEST 1 VIEW: CPT

## 2022-02-24 PROCEDURE — 77030036696 HC BOOT TRACT BUCKS S2SG -A

## 2022-02-24 PROCEDURE — 74011000250 HC RX REV CODE- 250: Performed by: FAMILY MEDICINE

## 2022-02-24 PROCEDURE — 85025 COMPLETE CBC W/AUTO DIFF WBC: CPT

## 2022-02-24 PROCEDURE — 65270000029 HC RM PRIVATE

## 2022-02-24 PROCEDURE — 93005 ELECTROCARDIOGRAM TRACING: CPT

## 2022-02-24 PROCEDURE — 81003 URINALYSIS AUTO W/O SCOPE: CPT

## 2022-02-24 PROCEDURE — 77030040361 HC SLV COMPR DVT MDII -B

## 2022-02-24 PROCEDURE — 73502 X-RAY EXAM HIP UNI 2-3 VIEWS: CPT

## 2022-02-24 PROCEDURE — 85610 PROTHROMBIN TIME: CPT

## 2022-02-24 PROCEDURE — 87635 SARS-COV-2 COVID-19 AMP PRB: CPT

## 2022-02-24 PROCEDURE — 2709999900 HC NON-CHARGEABLE SUPPLY

## 2022-02-24 PROCEDURE — 73552 X-RAY EXAM OF FEMUR 2/>: CPT

## 2022-02-24 PROCEDURE — 86900 BLOOD TYPING SEROLOGIC ABO: CPT

## 2022-02-24 PROCEDURE — 74011250637 HC RX REV CODE- 250/637: Performed by: FAMILY MEDICINE

## 2022-02-24 PROCEDURE — 80053 COMPREHEN METABOLIC PANEL: CPT

## 2022-02-24 RX ORDER — ACETAMINOPHEN 650 MG/1
650 SUPPOSITORY RECTAL
Status: DISCONTINUED | OUTPATIENT
Start: 2022-02-24 | End: 2022-02-26 | Stop reason: HOSPADM

## 2022-02-24 RX ORDER — OXYCODONE HYDROCHLORIDE 5 MG/1
5 TABLET ORAL
Status: DISCONTINUED | OUTPATIENT
Start: 2022-02-24 | End: 2022-02-24

## 2022-02-24 RX ORDER — MORPHINE SULFATE 4 MG/ML
1 INJECTION INTRAVENOUS
Status: DISCONTINUED | OUTPATIENT
Start: 2022-02-24 | End: 2022-02-25

## 2022-02-24 RX ORDER — LANOLIN ALCOHOL/MO/W.PET/CERES
1 CREAM (GRAM) TOPICAL
Status: DISCONTINUED | OUTPATIENT
Start: 2022-02-25 | End: 2022-02-26 | Stop reason: HOSPADM

## 2022-02-24 RX ORDER — LORAZEPAM 2 MG/ML
1 INJECTION INTRAMUSCULAR
Status: DISCONTINUED | OUTPATIENT
Start: 2022-02-24 | End: 2022-02-26 | Stop reason: HOSPADM

## 2022-02-24 RX ORDER — LISINOPRIL 20 MG/1
20 TABLET ORAL DAILY
Status: DISCONTINUED | OUTPATIENT
Start: 2022-02-25 | End: 2022-02-26 | Stop reason: HOSPADM

## 2022-02-24 RX ORDER — RIVASTIGMINE 9.5 MG/24H
1 PATCH, EXTENDED RELEASE TRANSDERMAL DAILY
Status: DISCONTINUED | OUTPATIENT
Start: 2022-02-25 | End: 2022-02-26 | Stop reason: HOSPADM

## 2022-02-24 RX ORDER — ONDANSETRON 4 MG/1
4 TABLET, ORALLY DISINTEGRATING ORAL
Status: DISCONTINUED | OUTPATIENT
Start: 2022-02-24 | End: 2022-02-26 | Stop reason: HOSPADM

## 2022-02-24 RX ORDER — QUETIAPINE FUMARATE 25 MG/1
50 TABLET, FILM COATED ORAL DAILY
Status: DISCONTINUED | OUTPATIENT
Start: 2022-02-25 | End: 2022-02-26 | Stop reason: HOSPADM

## 2022-02-24 RX ORDER — DIVALPROEX SODIUM 125 MG/1
125 CAPSULE, COATED PELLETS ORAL 2 TIMES DAILY
Status: DISCONTINUED | OUTPATIENT
Start: 2022-02-24 | End: 2022-02-26 | Stop reason: HOSPADM

## 2022-02-24 RX ORDER — SODIUM CHLORIDE 0.9 % (FLUSH) 0.9 %
5-40 SYRINGE (ML) INJECTION AS NEEDED
Status: DISCONTINUED | OUTPATIENT
Start: 2022-02-24 | End: 2022-02-26 | Stop reason: HOSPADM

## 2022-02-24 RX ORDER — PANTOPRAZOLE SODIUM 40 MG/1
40 TABLET, DELAYED RELEASE ORAL DAILY
Status: DISCONTINUED | OUTPATIENT
Start: 2022-02-25 | End: 2022-02-26 | Stop reason: HOSPADM

## 2022-02-24 RX ORDER — POLYETHYLENE GLYCOL 3350 17 G/17G
17 POWDER, FOR SOLUTION ORAL DAILY PRN
Status: DISCONTINUED | OUTPATIENT
Start: 2022-02-24 | End: 2022-02-26 | Stop reason: HOSPADM

## 2022-02-24 RX ORDER — ONDANSETRON 2 MG/ML
4 INJECTION INTRAMUSCULAR; INTRAVENOUS
Status: DISCONTINUED | OUTPATIENT
Start: 2022-02-24 | End: 2022-02-26 | Stop reason: HOSPADM

## 2022-02-24 RX ORDER — ACETAMINOPHEN 325 MG/1
650 TABLET ORAL
Status: DISCONTINUED | OUTPATIENT
Start: 2022-02-24 | End: 2022-02-26 | Stop reason: HOSPADM

## 2022-02-24 RX ORDER — OXYCODONE HYDROCHLORIDE 5 MG/1
5 TABLET ORAL
Status: DISCONTINUED | OUTPATIENT
Start: 2022-02-24 | End: 2022-02-26 | Stop reason: HOSPADM

## 2022-02-24 RX ORDER — SODIUM CHLORIDE 0.9 % (FLUSH) 0.9 %
5-40 SYRINGE (ML) INJECTION EVERY 8 HOURS
Status: DISCONTINUED | OUTPATIENT
Start: 2022-02-24 | End: 2022-02-26 | Stop reason: HOSPADM

## 2022-02-24 RX ORDER — CEFAZOLIN SODIUM/WATER 2 G/20 ML
2 SYRINGE (ML) INTRAVENOUS
Status: DISCONTINUED | OUTPATIENT
Start: 2022-02-24 | End: 2022-02-25 | Stop reason: SDUPTHER

## 2022-02-24 RX ADMIN — TUBERCULIN PURIFIED PROTEIN DERIVATIVE 5 UNITS: 5 INJECTION INTRADERMAL at 18:30

## 2022-02-24 RX ADMIN — OXYCODONE 5 MG: 5 TABLET ORAL at 20:49

## 2022-02-24 RX ADMIN — DIVALPROEX SODIUM 125 MG: 125 CAPSULE ORAL at 20:49

## 2022-02-24 NOTE — ED NOTES
TRANSFER - OUT REPORT:    Verbal report given to ALIX Loo on Rowena  being transferred to [facility] for routine progression of care       Report consisted of patients Situation, Background, Assessment and   Recommendations(SBAR). Information from the following report(s) SBAR was reviewed with the receiving nurse. Lines:   Peripheral IV 02/24/22 Right Forearm (Active)   Site Assessment Clean, dry, & intact 02/24/22 1412   Phlebitis Assessment 0 02/24/22 1412   Infiltration Assessment 0 02/24/22 1412   Dressing Status Clean, dry, & intact 02/24/22 1412   Hub Color/Line Status Pink 02/24/22 1412   Action Taken Blood drawn 02/24/22 1412        Opportunity for questions and clarification was provided.       Patient transported with:   transport

## 2022-02-24 NOTE — PROGRESS NOTES
Patient's chart reviewed. Patient was fed lunch this afternoon at nursing facility prior to transfer to ER necessitating late evening surgery to allow for sufficient NPO status. For patient safety, I feel it would be best to schedule surgery for AM when complete staffing is available and to allow for a more optimal NPO duration.

## 2022-02-24 NOTE — ED NOTES
TRANSFER - OUT REPORT:    Verbal report given to Sarah Sam RN on Mary Santiago  being transferred to [phone] for routine progression of care       Report consisted of patients Situation, Background, Assessment and   Recommendations(SBAR). Information from the following report(s) SBAR was reviewed with the receiving nurse. Lines:   Peripheral IV 02/24/22 Right Forearm (Active)   Site Assessment Clean, dry, & intact 02/24/22 1412   Phlebitis Assessment 0 02/24/22 1412   Infiltration Assessment 0 02/24/22 1412   Dressing Status Clean, dry, & intact 02/24/22 1412   Hub Color/Line Status Pink 02/24/22 1412   Action Taken Blood drawn 02/24/22 1412        Opportunity for questions and clarification was provided.       Patient transported with:   personal belonging

## 2022-02-24 NOTE — H&P
Hospitalist Admission History and Physical     NAME:  Kevin Bauer   Age:  80 y.o.  :   1940   MRN:   [id number]  PCP: Annalise Hernandez MD  Consulting MD:  Treatment Team: Attending Provider: Santhosh Love DO; Primary Nurse: Othelia Boeck, RN    Chief Complaint   Patient presents with    Hip Injury         HPI:   Patient is a 80 y.o. female who presented to the ED from [facility] after it was discovered she obtained a intertrochanteric fracture after a fall this AM. Hx of HTN, psych disorder, fall risk, and severe dementia. Cannot obtain hx from patient. Daughter states her mother at baseline is unable to carry on a conversation. Vitals- stable    Labs- Benign    Hip x ray - Comminuted and angulated intertrochanteric fracture of the left  Hip. EKG with prolonged CO interval  Past Medical History:   Diagnosis Date    Hypertension     Psychiatric disorder         No past surgical history on file. No family history on file. Social History     Social History Narrative    Not on file        Social History     Tobacco Use    Smoking status: Not on file    Smokeless tobacco: Not on file   Substance Use Topics    Alcohol use: Not Currently        Social History     Substance and Sexual Activity   Drug Use Not on file         No Known Allergies    Prior to Admission medications    Medication Sig Start Date End Date Taking? Authorizing Provider   divalproex (DEPAKOTE SPRINKLE) 125 mg capsule Take 125 mg by mouth two (2) times a day. Pinky Garduno MD   QUEtiapine (SEROQUEL) 50 mg tablet Take 50 mg by mouth daily. Pinky Garduno MD   lisinopril (PRINIVIL, ZESTRIL) 20 mg tablet Take  by mouth daily. Pinky Garduno MD   risperiDONE microspheres (RISPERDAL CONSTA) 50 mg/2 mL injection 50 mg by IntraMUSCular route Twice a month. Pinky Garduno MD           Review of Systems    Cannot obtain due to confusion.        Objective:     Visit Vitals  /87   Pulse (!) 101 Temp 98 °F (36.7 °C)   Resp 14   Ht 5' 8\" (1.727 m)   Wt 75.6 kg (166 lb 11.2 oz)   SpO2 93%   BMI 25.35 kg/m²        No intake/output data recorded. No intake/output data recorded. Data Review:   Recent Results (from the past 24 hour(s))   EKG, 12 LEAD, INITIAL    Collection Time: 02/24/22  2:07 PM   Result Value Ref Range    Ventricular Rate 101 BPM    Atrial Rate 103 BPM    P-R Interval 217 ms    QRS Duration 98 ms    Q-T Interval 359 ms    QTC Calculation (Bezet) 466 ms    Calculated P Axis 41 degrees    Calculated R Axis -69 degrees    Calculated T Axis 55 degrees    Diagnosis       Sinus tachycardia  Prolonged NV interval  LAD, consider left anterior fascicular block  Abnormal R-wave progression, early transition  Borderline repolarization abnormality    Confirmed by Nikki Carrel MD, Catarina Vu ([id number]) on 2/24/2022 2:27:41 PM     CBC WITH AUTOMATED DIFF    Collection Time: 02/24/22  2:15 PM   Result Value Ref Range    WBC 8.6 4.3 - 11.1 K/uL    RBC 3.67 (L) 4.05 - 5.2 M/uL    HGB 10.7 (L) 11.7 - 15.4 g/dL    HCT 34.4 (L) 35.8 - 46.3 %    MCV 93.7 79.6 - 97.8 FL    MCH 29.2 26.1 - 32.9 PG    MCHC 31.1 (L) 31.4 - 35.0 g/dL    RDW 14.1 11.9 - 14.6 %    PLATELET 134 888 - 832 K/uL    MPV 9.7 9.4 - 12.3 FL    ABSOLUTE NRBC 0.00 0.0 - 0.2 K/uL    DF AUTOMATED      NEUTROPHILS 88 (H) 43 - 78 %    LYMPHOCYTES 5 (L) 13 - 44 %    MONOCYTES 6 4.0 - 12.0 %    EOSINOPHILS 0 (L) 0.5 - 7.8 %    BASOPHILS 0 0.0 - 2.0 %    IMMATURE GRANULOCYTES 1 0.0 - 5.0 %    ABS. NEUTROPHILS 7.6 1.7 - 8.2 K/UL    ABS. LYMPHOCYTES 0.5 0.5 - 4.6 K/UL    ABS. MONOCYTES 0.5 0.1 - 1.3 K/UL    ABS. EOSINOPHILS 0.0 0.0 - 0.8 K/UL    ABS. BASOPHILS 0.0 0.0 - 0.2 K/UL    ABS. IMM.  GRANS. 0.1 0.0 - 0.5 K/UL   METABOLIC PANEL, COMPREHENSIVE    Collection Time: 02/24/22  2:15 PM   Result Value Ref Range    Sodium 140 136 - 145 mmol/L    Potassium 3.7 3.5 - 5.1 mmol/L    Chloride 110 (H) 98 - 107 mmol/L    CO2 28 21 - 32 mmol/L    Anion gap 2 (L) 7 - 16 mmol/L    Glucose 158 (H) 65 - 100 mg/dL    BUN 33 (H) 8 - 23 MG/DL    Creatinine 0.60 0.6 - 1.0 MG/DL    GFR est AA >60 >60 ml/min/1.73m2    GFR est non-AA >60 >60 ml/min/1.73m2    Calcium 9.6 8.3 - 10.4 MG/DL    Bilirubin, total 0.4 0.2 - 1.1 MG/DL    ALT (SGPT) 21 12 - 65 U/L    AST (SGOT) 13 (L) 15 - 37 U/L    Alk. phosphatase 169 (H) 50 - 136 U/L    Protein, total 6.9 6.3 - 8.2 g/dL    Albumin 3.5 3.2 - 4.6 g/dL    Globulin 3.4 2.3 - 3.5 g/dL    A-G Ratio 1.0 (L) 1.2 - 3.5     TYPE & SCREEN    Collection Time: 02/24/22  2:15 PM   Result Value Ref Range    Crossmatch Expiration 02/27/2022,2359     ABO/Rh(D) Nikhil Fultonks POSITIVE     Antibody screen NEG    PROTHROMBIN TIME + INR    Collection Time: 02/24/22  2:15 PM   Result Value Ref Range    Prothrombin time 14.3 12.6 - 14.5 sec    INR 1.1         Physical Exam:     General:  Alert, cooperative, no distress, pleasant but confused. Eyes:  Conjunctivae/corneas clear. PERRL   Ears:  Normal TMs and external ear canals both ears. Nose: Nares normal.    Mouth/Throat: Lips, mucosa, and tongue normal. Teeth and gums normal.   Neck:  no JVD. Back:   deferred   Lungs:   Clear to auscultation bilaterally. Heart:  Regular rate and rhythm, S1, S2 normal   Abdomen:   Soft, mild inconsistent tenderness without rebound. No obvious pain. Bowel sounds normal.    Extremities: Limited movement of Left LE   Pulses: 2+ and symmetric all extremities. Skin: Skin color, texture, turgor normal. No rashes or lesions   Lymph nodes: Cervical, supraclavicular, and axillary nodes normal.   Neurologic: Baseline confusion. Assessment and Plan     Principal Problem:    Closed comminuted intertrochanteric fracture of proximal femur, left, initial encounter ([facility] [address] (2/24/2022)    Active Problems:    Psychiatric disorder ()      HTN (hypertension) (2/24/2022)      Left hip fracture - Consult ortho. PRN pain medications. UA pending. High fall risk per nursing papers.  Likely fell due to her baseline dementia. Behavioral d/o- Depakote BID. Seroquel. Exelon. PRN ativan. HTN- ACE    Spoken to daughter.  Daughter states patient is DNR    DVT prophylaxis - SCDs  Signed By: Octavio Burrell DO   February 24, 2022

## 2022-02-24 NOTE — ACP (ADVANCE CARE PLANNING)
[facility] Hospitalist Service  At the heart of better care     Advance Care Planning   Admit Date:  2022  2:01 PM   Name:  Ignacio Lyons   Age:  80 y.o. Sex:  female  :  1940   MRN:  [id number]   Room:  [place] [address]    Ignacio Lyons is able to make her own decisions: No    If pt unable to make decisions, POA/surrogate decision maker:  Daughter and Son    Other members present in the meeting:   Daughter    Patient / surrogate decision-maker directed:  Code Status: DO NOT RESUSCITATE, DO NOT INTUBATE -okay for other aggressive medical and surgical intervention        Patient or surrogate consented to discussion of the current conditions, workup, management plans, prognosis, and understand the risk for further deterioration. Time spent: 18 minutes in direct discussion (face to face and/or over phone).     Signed:  Paulo Lott DO

## 2022-02-24 NOTE — ED PROVIDER NOTES
81 Augustinjr Haynes is a 80 y.o. female seen on 2022 in the [facility] EMERGENCY DEPT in room ER04/04. Chief Complaint   Patient presents with    Hip Injury     History and review of systems significantly limited secondary to patient's baseline dementia. History obtained from EMS and nursing home records. HPI: 24-year-old  female presented to the emergency department from skilled nursing facility after falling and breaking her hip. Patient was found on the ground this morning by her bed around 430. This was an unwitnessed fall. The nursing facility obtained an x-ray of her hip which revealed a left intertrochanteric fracture. Patient was sent to the emergency department for further treatment evaluation. Patient does not recall her fall and does not complain of pain however does restrict her range of motion. Further history and review of systems unobtainable secondary to patient's baseline dementia. Per EMS, patient is at her baseline dementia. Historian: EMS and nursing home records. REVIEW OF SYSTEMS     Review of Systems   Unable to perform ROS: Dementia   Musculoskeletal: Positive for arthralgias and gait problem. PAST MEDICAL HISTORY     Past Medical History:   Diagnosis Date    Hypertension     Psychiatric disorder      No past surgical history on file. Social History     Socioeconomic History    Marital status:    Substance and Sexual Activity    Alcohol use: Not Currently     Prior to Admission Medications   Prescriptions Last Dose Informant Patient Reported? Taking? QUEtiapine (SEROQUEL) 50 mg tablet   Yes No   Sig: Take 50 mg by mouth daily. divalproex (DEPAKOTE SPRINKLE) 125 mg capsule   Yes No   Sig: Take 125 mg by mouth two (2) times a day. lisinopril (PRINIVIL, ZESTRIL) 20 mg tablet   Yes No   Sig: Take  by mouth daily.    risperiDONE microspheres (RISPERDAL CONSTA) 50 mg/2 mL injection   Yes No   Si mg by IntraMUSCular route Twice a month. Facility-Administered Medications: None     No Known Allergies     PHYSICAL EXAM       Vitals:    02/24/22 1405   BP: 132/87   Pulse: (!) 101   Resp: 14   Temp: 98 °F (36.7 °C)   SpO2: 93%    Vital signs were reviewed. Physical Exam  Vitals and nursing note reviewed. Constitutional:       General: She is not in acute distress. Appearance: She is not ill-appearing or toxic-appearing. HENT:      Head: Normocephalic and atraumatic. Mouth/Throat:      Mouth: Mucous membranes are moist.   Eyes:      Extraocular Movements: Extraocular movements intact. Pupils: Pupils are equal, round, and reactive to light. Cardiovascular:      Rate and Rhythm: Normal rate and regular rhythm. Pulses: Normal pulses. Heart sounds: Normal heart sounds. Pulmonary:      Effort: Pulmonary effort is normal.      Breath sounds: Normal breath sounds. Abdominal:      Palpations: Abdomen is soft. Tenderness: There is no abdominal tenderness. There is no guarding. Musculoskeletal:         General: Tenderness, deformity and signs of injury present. Cervical back: Normal range of motion. Comments: Shortening and rotation of the left lower extremity consistent with hip fracture. Neurological:      Mental Status: She is alert. Mental status is at baseline. Comments: Oriented to person only   Psychiatric:      Comments:  Only answers simple questions          MEDICAL DECISION MAKING     ED Course:    Orders Placed This Encounter    XR CHEST PORT    XR HIP LT W OR WO PELV 2-3 VWS    XR FEMUR LT 2 V    CBC WITH AUTOMATED DIFF    METABOLIC PANEL,COMP.    PROTHROMBIN TIME + INR    URINALYSIS W/ RFLX MICROSCOPIC    EKG 12 LEAD INITIAL    EKG, 12 LEAD, INITIAL    TYPE & SCREEN    INSERT PERIPHERAL IV ONE TIME STAT    divalproex (DEPAKOTE SPRINKLE) capsule 125 mg    lisinopriL (PRINIVIL, ZESTRIL) tablet 20 mg    QUEtiapine (SEROquel) tablet 50 mg Recent Results (from the past 8 hour(s))   EKG, 12 LEAD, INITIAL    Collection Time: 02/24/22  2:07 PM   Result Value Ref Range    Ventricular Rate 101 BPM    Atrial Rate 103 BPM    P-R Interval 217 ms    QRS Duration 98 ms    Q-T Interval 359 ms    QTC Calculation (Bezet) 466 ms    Calculated P Axis 41 degrees    Calculated R Axis -69 degrees    Calculated T Axis 55 degrees    Diagnosis       Sinus tachycardia  Prolonged GA interval  LAD, consider left anterior fascicular block  Abnormal R-wave progression, early transition  Borderline repolarization abnormality    Confirmed by Perla Price MD, Abisai Carlos ([id number]) on 2/24/2022 2:27:41 PM     CBC WITH AUTOMATED DIFF    Collection Time: 02/24/22  2:15 PM   Result Value Ref Range    WBC 8.6 4.3 - 11.1 K/uL    RBC 3.67 (L) 4.05 - 5.2 M/uL    HGB 10.7 (L) 11.7 - 15.4 g/dL    HCT 34.4 (L) 35.8 - 46.3 %    MCV 93.7 79.6 - 97.8 FL    MCH 29.2 26.1 - 32.9 PG    MCHC 31.1 (L) 31.4 - 35.0 g/dL    RDW 14.1 11.9 - 14.6 %    PLATELET 962 566 - 869 K/uL    MPV 9.7 9.4 - 12.3 FL    ABSOLUTE NRBC 0.00 0.0 - 0.2 K/uL    DF AUTOMATED      NEUTROPHILS 88 (H) 43 - 78 %    LYMPHOCYTES 5 (L) 13 - 44 %    MONOCYTES 6 4.0 - 12.0 %    EOSINOPHILS 0 (L) 0.5 - 7.8 %    BASOPHILS 0 0.0 - 2.0 %    IMMATURE GRANULOCYTES 1 0.0 - 5.0 %    ABS. NEUTROPHILS 7.6 1.7 - 8.2 K/UL    ABS. LYMPHOCYTES 0.5 0.5 - 4.6 K/UL    ABS. MONOCYTES 0.5 0.1 - 1.3 K/UL    ABS. EOSINOPHILS 0.0 0.0 - 0.8 K/UL    ABS. BASOPHILS 0.0 0.0 - 0.2 K/UL    ABS. IMM.  GRANS. 0.1 0.0 - 0.5 K/UL   METABOLIC PANEL, COMPREHENSIVE    Collection Time: 02/24/22  2:15 PM   Result Value Ref Range    Sodium 140 136 - 145 mmol/L    Potassium 3.7 3.5 - 5.1 mmol/L    Chloride 110 (H) 98 - 107 mmol/L    CO2 28 21 - 32 mmol/L    Anion gap 2 (L) 7 - 16 mmol/L    Glucose 158 (H) 65 - 100 mg/dL    BUN 33 (H) 8 - 23 MG/DL    Creatinine 0.60 0.6 - 1.0 MG/DL    GFR est AA >60 >60 ml/min/1.73m2    GFR est non-AA >60 >60 ml/min/1.73m2    Calcium 9.6 8.3 - 10.4 MG/DL    Bilirubin, total 0.4 0.2 - 1.1 MG/DL    ALT (SGPT) 21 12 - 65 U/L    AST (SGOT) 13 (L) 15 - 37 U/L    Alk. phosphatase 169 (H) 50 - 136 U/L    Protein, total 6.9 6.3 - 8.2 g/dL    Albumin 3.5 3.2 - 4.6 g/dL    Globulin 3.4 2.3 - 3.5 g/dL    A-G Ratio 1.0 (L) 1.2 - 3.5     TYPE & SCREEN    Collection Time: 02/24/22  2:15 PM   Result Value Ref Range    Crossmatch Expiration 02/27/2022,2359     ABO/Rh(D) Sharlene Stinson POSITIVE     Antibody screen NEG    PROTHROMBIN TIME + INR    Collection Time: 02/24/22  2:15 PM   Result Value Ref Range    Prothrombin time 14.3 12.6 - 14.5 sec    INR 1.1       XR HIP LT W OR WO PELV 2-3 VWS    Result Date: 2/24/2022  3 VIEW PELVIS AND LEFT HIP, TWO-VIEW LEFT FEMUR: CLINICAL HISTORY:  Left hip pain after fall this morning. COMPARISON:  Right femur of February 15, 2022 and earlier studies. FINDINGS:  There is a comminuted intertrochanteric fracture of the proximal left femur with little displacement but significant varus angulation of the principal distal fragment. Left femur appears intact distally as imaged, with degenerative changes at the knee including chondrocalcinosis. Healing right femoral intertrochanteric fracture status post ORIF is again noted. The pelvic ring appears intact in the AP projection, although underlying osteopenia and overlying stool limits evaluation for possible nondisplaced fractures. Comminuted and angulated intertrochanteric fracture of the left hip. XR FEMUR LT 2 V    Result Date: 2/24/2022  3 VIEW PELVIS AND LEFT HIP, TWO-VIEW LEFT FEMUR: CLINICAL HISTORY:  Left hip pain after fall this morning. COMPARISON:  Right femur of February 15, 2022 and earlier studies. FINDINGS:  There is a comminuted intertrochanteric fracture of the proximal left femur with little displacement but significant varus angulation of the principal distal fragment.   Left femur appears intact distally as imaged, with degenerative changes at the knee including chondrocalcinosis. Healing right femoral intertrochanteric fracture status post ORIF is again noted. The pelvic ring appears intact in the AP projection, although underlying osteopenia and overlying stool limits evaluation for possible nondisplaced fractures. Comminuted and angulated intertrochanteric fracture of the left hip. XR CHEST PORT    Result Date: 2/24/2022  PORTABLE CHEST, February 24, 2022 at 1435 hours CLINICAL HISTORY:  Preoperative evaluation for hip fracture from fall. COMPARISON:  December 15, 2021. FINDINGS:  AP supine image demonstrates no confluent infiltrate or significant pleural fluid. The heart size is within normal limits with atherosclerotic calcification and tortuosity of the aorta but no evidence of congestive heart failure or pneumothorax. The bony thorax appears intact on this view. There are overlying radiopaque support devices. NO ACUTE CARDIOPULMONARY DISEASE OR BONY ABNORMALITY IDENTIFIED. EKG interpretation personally: Rate 101. Sinus tachycardia. Prolonged [place] interval. Normal QT intervals. ED Course as of 02/24/22 1548   Thu Feb 24, 2022   1510 Discussed with orthopedics. They will see patient in consult. [JL]      ED Course User Index  [JL] Alyssa Coates DO     MDM  Number of Diagnoses or Management Options  Closed comminuted intertrochanteric fracture of left femur, initial encounter ([facility] [address]  Fall, initial encounter  Diagnosis management comments: 35-year-old female with history of dementia presented to the emergency department with complaints of fractured left hip. Patient will be admitted to hospitalist with orthopedics on consult.        Amount and/or Complexity of Data Reviewed  Clinical lab tests: ordered and reviewed  Tests in the radiology section of CPT®: ordered and reviewed  Obtain history from someone other than the patient: yes  Discuss the patient with other providers: yes  Independent visualization of images, tracings, or specimens: yes    Patient Progress  Patient progress: stable        Disposition:  Admitted  Diagnosis:     ICD-10-CM ICD-9-CM   1. Closed comminuted intertrochanteric fracture of left femur, initial encounter ([facility]  S72.142A 820.21   2. Fall, initial encounter  [address] Hannah Leon N892.2     ____________________________________________________________________  A portion of this note was generated using voice recognition dictation software. While the note has been reviewed for accuracy, please note certain words and phrases may not be transcribed as intended and some grammatical and/or typographical errors may be present.

## 2022-02-24 NOTE — PROGRESS NOTES
I spoke with Dr Carolina Mayer not able to do hip tonight so I will plan on treating left hip with intramedullary nail in the morning

## 2022-02-24 NOTE — ED TRIAGE NOTES
Pt arrives via EMA from [facility].  Reports fall around 0430 this am with pain in the left hip XRAY performed at the facility with \"intertrochanteric fracture\" hx dementia, hx alzheimer's with mentation at baseline per facility

## 2022-02-24 NOTE — ANESTHESIA PREPROCEDURE EVALUATION
Relevant Problems   No relevant active problems       Anesthetic History               Review of Systems / Medical History  Patient summary reviewed and pertinent labs reviewed    Pulmonary                   Neuro/Psych         Psychiatric history and dementia (severe)     Cardiovascular    Hypertension                   GI/Hepatic/Renal                Endo/Other             Other Findings            Physical Exam    Airway  Mallampati: II    Neck ROM: decreased range of motion        Cardiovascular  Regular rate and rhythm,  S1 and S2 normal,  no murmur, click, rub, or gallop             Dental         Pulmonary  Breath sounds clear to auscultation               Abdominal         Other Findings            Anesthetic Plan    ASA: 3  Anesthesia type: general            Anesthetic plan and risks discussed with: Son / Daughter      Patient unable to give hx. Spoke with daughter Thor De Santiago) by phone. Tolerated GA with LMA in 12/2021.

## 2022-02-25 ENCOUNTER — APPOINTMENT (OUTPATIENT)
Dept: GENERAL RADIOLOGY | Age: 82
DRG: 481 | End: 2022-02-25
Attending: ORTHOPAEDIC SURGERY
Payer: MEDICARE

## 2022-02-25 LAB
ANION GAP SERPL CALC-SCNC: 7 MMOL/L (ref 7–16)
BUN SERPL-MCNC: 28 MG/DL (ref 8–23)
CALCIUM SERPL-MCNC: 9.4 MG/DL (ref 8.3–10.4)
CHLORIDE SERPL-SCNC: 107 MMOL/L (ref 98–107)
CO2 SERPL-SCNC: 27 MMOL/L (ref 21–32)
CREAT SERPL-MCNC: 0.4 MG/DL (ref 0.6–1)
GLUCOSE SERPL-MCNC: 117 MG/DL (ref 65–100)
POTASSIUM SERPL-SCNC: 3.7 MMOL/L (ref 3.5–5.1)
SODIUM SERPL-SCNC: 141 MMOL/L (ref 136–145)

## 2022-02-25 PROCEDURE — 74011250636 HC RX REV CODE- 250/636: Performed by: FAMILY MEDICINE

## 2022-02-25 PROCEDURE — 74011250637 HC RX REV CODE- 250/637: Performed by: ORTHOPAEDIC SURGERY

## 2022-02-25 PROCEDURE — 77030021107 HC SHFT RMR MOD DISP STRY -D: Performed by: ORTHOPAEDIC SURGERY

## 2022-02-25 PROCEDURE — 77030040922 HC BLNKT HYPOTHRM STRY -A: Performed by: NURSE ANESTHETIST, CERTIFIED REGISTERED

## 2022-02-25 PROCEDURE — 0QS7XZZ REPOSITION LEFT UPPER FEMUR, EXTERNAL APPROACH: ICD-10-PCS | Performed by: ORTHOPAEDIC SURGERY

## 2022-02-25 PROCEDURE — 77030002933 HC SUT MCRYL J&J -A: Performed by: ORTHOPAEDIC SURGERY

## 2022-02-25 PROCEDURE — 77030010509 HC AIRWY LMA MSK TELE -A: Performed by: NURSE ANESTHETIST, CERTIFIED REGISTERED

## 2022-02-25 PROCEDURE — 73502 X-RAY EXAM HIP UNI 2-3 VIEWS: CPT

## 2022-02-25 PROCEDURE — 36415 COLL VENOUS BLD VENIPUNCTURE: CPT

## 2022-02-25 PROCEDURE — 74011000250 HC RX REV CODE- 250: Performed by: ORTHOPAEDIC SURGERY

## 2022-02-25 PROCEDURE — C1769 GUIDE WIRE: HCPCS | Performed by: ORTHOPAEDIC SURGERY

## 2022-02-25 PROCEDURE — 77010033678 HC OXYGEN DAILY

## 2022-02-25 PROCEDURE — 74011250636 HC RX REV CODE- 250/636: Performed by: ORTHOPAEDIC SURGERY

## 2022-02-25 PROCEDURE — C1713 ANCHOR/SCREW BN/BN,TIS/BN: HCPCS | Performed by: ORTHOPAEDIC SURGERY

## 2022-02-25 PROCEDURE — 99232 SBSQ HOSP IP/OBS MODERATE 35: CPT | Performed by: ORTHOPAEDIC SURGERY

## 2022-02-25 PROCEDURE — 0QH706Z INSERTION OF INTRAMEDULLARY INTERNAL FIXATION DEVICE INTO LEFT UPPER FEMUR, OPEN APPROACH: ICD-10-PCS | Performed by: ORTHOPAEDIC SURGERY

## 2022-02-25 PROCEDURE — 74011250636 HC RX REV CODE- 250/636: Performed by: ANESTHESIOLOGY

## 2022-02-25 PROCEDURE — 77030016449 HC BIT DRL AO1 STRY -C: Performed by: ORTHOPAEDIC SURGERY

## 2022-02-25 PROCEDURE — 76060000032 HC ANESTHESIA 0.5 TO 1 HR: Performed by: ORTHOPAEDIC SURGERY

## 2022-02-25 PROCEDURE — 2709999900 HC NON-CHARGEABLE SUPPLY: Performed by: ORTHOPAEDIC SURGERY

## 2022-02-25 PROCEDURE — 77030017016 HC DSG ANTIMIC BARR2 S&N -B: Performed by: ORTHOPAEDIC SURGERY

## 2022-02-25 PROCEDURE — 94760 N-INVAS EAR/PLS OXIMETRY 1: CPT

## 2022-02-25 PROCEDURE — 76210000006 HC OR PH I REC 0.5 TO 1 HR: Performed by: ORTHOPAEDIC SURGERY

## 2022-02-25 PROCEDURE — 76010000138 HC OR TIME 0.5 TO 1 HR: Performed by: ORTHOPAEDIC SURGERY

## 2022-02-25 PROCEDURE — 74011250636 HC RX REV CODE- 250/636: Performed by: NURSE PRACTITIONER

## 2022-02-25 PROCEDURE — 74011000250 HC RX REV CODE- 250: Performed by: FAMILY MEDICINE

## 2022-02-25 PROCEDURE — 65270000029 HC RM PRIVATE

## 2022-02-25 PROCEDURE — 74011000250 HC RX REV CODE- 250: Performed by: NURSE ANESTHETIST, CERTIFIED REGISTERED

## 2022-02-25 PROCEDURE — 77030019908 HC STETH ESOPH SIMS -A: Performed by: NURSE ANESTHETIST, CERTIFIED REGISTERED

## 2022-02-25 PROCEDURE — 74011000258 HC RX REV CODE- 258: Performed by: ORTHOPAEDIC SURGERY

## 2022-02-25 PROCEDURE — 77030018673: Performed by: ORTHOPAEDIC SURGERY

## 2022-02-25 PROCEDURE — 74011250636 HC RX REV CODE- 250/636: Performed by: NURSE ANESTHETIST, CERTIFIED REGISTERED

## 2022-02-25 PROCEDURE — 80048 BASIC METABOLIC PNL TOTAL CA: CPT

## 2022-02-25 PROCEDURE — 73552 X-RAY EXAM OF FEMUR 2/>: CPT

## 2022-02-25 DEVICE — LAG SCREW, TI
Type: IMPLANTABLE DEVICE | Site: HIP | Status: FUNCTIONAL
Brand: GAMMA

## 2022-02-25 DEVICE — LOCKING SCREW, FULLY THREADED: Type: IMPLANTABLE DEVICE | Site: HIP | Status: FUNCTIONAL

## 2022-02-25 DEVICE — LONG NAIL KIT R1.5, TI, LEFT
Type: IMPLANTABLE DEVICE | Site: HIP | Status: FUNCTIONAL
Brand: GAMMA

## 2022-02-25 RX ORDER — PROPOFOL 10 MG/ML
INJECTION, EMULSION INTRAVENOUS AS NEEDED
Status: DISCONTINUED | OUTPATIENT
Start: 2022-02-25 | End: 2022-02-25 | Stop reason: HOSPADM

## 2022-02-25 RX ORDER — ALBUTEROL SULFATE 0.83 MG/ML
2.5 SOLUTION RESPIRATORY (INHALATION) AS NEEDED
Status: DISCONTINUED | OUTPATIENT
Start: 2022-02-25 | End: 2022-02-25 | Stop reason: HOSPADM

## 2022-02-25 RX ORDER — EPHEDRINE SULFATE/0.9% NACL/PF 50 MG/5 ML
SYRINGE (ML) INTRAVENOUS AS NEEDED
Status: DISCONTINUED | OUTPATIENT
Start: 2022-02-25 | End: 2022-02-25 | Stop reason: HOSPADM

## 2022-02-25 RX ORDER — SODIUM CHLORIDE, SODIUM LACTATE, POTASSIUM CHLORIDE, CALCIUM CHLORIDE 600; 310; 30; 20 MG/100ML; MG/100ML; MG/100ML; MG/100ML
50 INJECTION, SOLUTION INTRAVENOUS CONTINUOUS
Status: DISCONTINUED | OUTPATIENT
Start: 2022-02-25 | End: 2022-02-25 | Stop reason: HOSPADM

## 2022-02-25 RX ORDER — SODIUM CHLORIDE, SODIUM LACTATE, POTASSIUM CHLORIDE, CALCIUM CHLORIDE 600; 310; 30; 20 MG/100ML; MG/100ML; MG/100ML; MG/100ML
50 INJECTION, SOLUTION INTRAVENOUS CONTINUOUS
Status: DISCONTINUED | OUTPATIENT
Start: 2022-02-25 | End: 2022-02-26 | Stop reason: HOSPADM

## 2022-02-25 RX ORDER — ACETAMINOPHEN 500 MG
1000 TABLET ORAL ONCE
Status: DISCONTINUED | OUTPATIENT
Start: 2022-02-25 | End: 2022-02-25 | Stop reason: HOSPADM

## 2022-02-25 RX ORDER — FERROUS SULFATE, DRIED 160(50) MG
1 TABLET, EXTENDED RELEASE ORAL
Status: DISCONTINUED | OUTPATIENT
Start: 2022-02-25 | End: 2022-02-26 | Stop reason: HOSPADM

## 2022-02-25 RX ORDER — FENTANYL CITRATE 50 UG/ML
INJECTION, SOLUTION INTRAMUSCULAR; INTRAVENOUS AS NEEDED
Status: DISCONTINUED | OUTPATIENT
Start: 2022-02-25 | End: 2022-02-25 | Stop reason: HOSPADM

## 2022-02-25 RX ORDER — ASPIRIN 325 MG
325 TABLET, DELAYED RELEASE (ENTERIC COATED) ORAL DAILY
Status: DISCONTINUED | OUTPATIENT
Start: 2022-02-25 | End: 2022-02-26 | Stop reason: HOSPADM

## 2022-02-25 RX ORDER — FENTANYL CITRATE 50 UG/ML
100 INJECTION, SOLUTION INTRAMUSCULAR; INTRAVENOUS ONCE
Status: DISCONTINUED | OUTPATIENT
Start: 2022-02-25 | End: 2022-02-25 | Stop reason: HOSPADM

## 2022-02-25 RX ORDER — DEXAMETHASONE SODIUM PHOSPHATE 4 MG/ML
INJECTION, SOLUTION INTRA-ARTICULAR; INTRALESIONAL; INTRAMUSCULAR; INTRAVENOUS; SOFT TISSUE AS NEEDED
Status: DISCONTINUED | OUTPATIENT
Start: 2022-02-25 | End: 2022-02-25 | Stop reason: HOSPADM

## 2022-02-25 RX ORDER — ONDANSETRON 2 MG/ML
INJECTION INTRAMUSCULAR; INTRAVENOUS AS NEEDED
Status: DISCONTINUED | OUTPATIENT
Start: 2022-02-25 | End: 2022-02-25 | Stop reason: HOSPADM

## 2022-02-25 RX ORDER — MIDAZOLAM HYDROCHLORIDE 1 MG/ML
2 INJECTION, SOLUTION INTRAMUSCULAR; INTRAVENOUS
Status: DISCONTINUED | OUTPATIENT
Start: 2022-02-25 | End: 2022-02-25 | Stop reason: HOSPADM

## 2022-02-25 RX ORDER — MORPHINE SULFATE 4 MG/ML
2 INJECTION INTRAVENOUS
Status: DISCONTINUED | OUTPATIENT
Start: 2022-02-25 | End: 2022-02-26 | Stop reason: HOSPADM

## 2022-02-25 RX ORDER — SODIUM CHLORIDE 0.9 % (FLUSH) 0.9 %
5-40 SYRINGE (ML) INJECTION AS NEEDED
Status: DISCONTINUED | OUTPATIENT
Start: 2022-02-25 | End: 2022-02-25

## 2022-02-25 RX ORDER — PROCHLORPERAZINE EDISYLATE 5 MG/ML
5 INJECTION INTRAMUSCULAR; INTRAVENOUS ONCE
Status: DISCONTINUED | OUTPATIENT
Start: 2022-02-25 | End: 2022-02-25 | Stop reason: HOSPADM

## 2022-02-25 RX ORDER — LIDOCAINE HYDROCHLORIDE 10 MG/ML
0.1 INJECTION INFILTRATION; PERINEURAL AS NEEDED
Status: DISCONTINUED | OUTPATIENT
Start: 2022-02-25 | End: 2022-02-25 | Stop reason: HOSPADM

## 2022-02-25 RX ORDER — SODIUM CHLORIDE 0.9 % (FLUSH) 0.9 %
5-40 SYRINGE (ML) INJECTION EVERY 8 HOURS
Status: DISCONTINUED | OUTPATIENT
Start: 2022-02-25 | End: 2022-02-25

## 2022-02-25 RX ORDER — MIDAZOLAM HYDROCHLORIDE 1 MG/ML
2 INJECTION, SOLUTION INTRAMUSCULAR; INTRAVENOUS ONCE
Status: DISCONTINUED | OUTPATIENT
Start: 2022-02-25 | End: 2022-02-25 | Stop reason: HOSPADM

## 2022-02-25 RX ORDER — CEFAZOLIN SODIUM/WATER 2 G/20 ML
2 SYRINGE (ML) INTRAVENOUS
Status: COMPLETED | OUTPATIENT
Start: 2022-02-25 | End: 2022-02-25

## 2022-02-25 RX ORDER — SODIUM CHLORIDE, SODIUM LACTATE, POTASSIUM CHLORIDE, CALCIUM CHLORIDE 600; 310; 30; 20 MG/100ML; MG/100ML; MG/100ML; MG/100ML
75 INJECTION, SOLUTION INTRAVENOUS CONTINUOUS
Status: DISCONTINUED | OUTPATIENT
Start: 2022-02-25 | End: 2022-02-25 | Stop reason: HOSPADM

## 2022-02-25 RX ORDER — MAG HYDROX/ALUMINUM HYD/SIMETH 200-200-20
30 SUSPENSION, ORAL (FINAL DOSE FORM) ORAL
Status: DISCONTINUED | OUTPATIENT
Start: 2022-02-25 | End: 2022-02-26 | Stop reason: HOSPADM

## 2022-02-25 RX ORDER — OXYCODONE HYDROCHLORIDE 5 MG/1
5 TABLET ORAL
Status: DISCONTINUED | OUTPATIENT
Start: 2022-02-25 | End: 2022-02-25 | Stop reason: HOSPADM

## 2022-02-25 RX ORDER — HYDROMORPHONE HYDROCHLORIDE 2 MG/ML
0.5 INJECTION, SOLUTION INTRAMUSCULAR; INTRAVENOUS; SUBCUTANEOUS
Status: DISCONTINUED | OUTPATIENT
Start: 2022-02-25 | End: 2022-02-25 | Stop reason: HOSPADM

## 2022-02-25 RX ORDER — LIDOCAINE HYDROCHLORIDE 20 MG/ML
INJECTION, SOLUTION EPIDURAL; INFILTRATION; INTRACAUDAL; PERINEURAL AS NEEDED
Status: DISCONTINUED | OUTPATIENT
Start: 2022-02-25 | End: 2022-02-25 | Stop reason: HOSPADM

## 2022-02-25 RX ADMIN — FENTANYL CITRATE 25 MCG: 50 INJECTION INTRAMUSCULAR; INTRAVENOUS at 10:01

## 2022-02-25 RX ADMIN — Medication 1 TABLET: at 12:14

## 2022-02-25 RX ADMIN — Medication 5 MG: at 09:42

## 2022-02-25 RX ADMIN — FENTANYL CITRATE 25 MCG: 50 INJECTION INTRAMUSCULAR; INTRAVENOUS at 10:04

## 2022-02-25 RX ADMIN — DEXAMETHASONE SODIUM PHOSPHATE 4 MG: 4 INJECTION, SOLUTION INTRAMUSCULAR; INTRAVENOUS at 09:41

## 2022-02-25 RX ADMIN — ACETAMINOPHEN 650 MG: 325 TABLET ORAL at 20:46

## 2022-02-25 RX ADMIN — SODIUM CHLORIDE, SODIUM LACTATE, POTASSIUM CHLORIDE, AND CALCIUM CHLORIDE 50 ML/HR: 600; 310; 30; 20 INJECTION, SOLUTION INTRAVENOUS at 14:00

## 2022-02-25 RX ADMIN — DIVALPROEX SODIUM 125 MG: 125 CAPSULE ORAL at 12:14

## 2022-02-25 RX ADMIN — CEFAZOLIN SODIUM 1 G: 1 INJECTION, POWDER, FOR SOLUTION INTRAMUSCULAR; INTRAVENOUS at 17:33

## 2022-02-25 RX ADMIN — FERROUS SULFATE TAB 325 MG (65 MG ELEMENTAL FE) 325 MG: 325 (65 FE) TAB at 12:14

## 2022-02-25 RX ADMIN — PHENYLEPHRINE HYDROCHLORIDE 120 MCG: 10 INJECTION INTRAVENOUS at 09:36

## 2022-02-25 RX ADMIN — DIVALPROEX SODIUM 125 MG: 125 CAPSULE ORAL at 17:32

## 2022-02-25 RX ADMIN — PHENYLEPHRINE HYDROCHLORIDE 120 MCG: 10 INJECTION INTRAVENOUS at 09:53

## 2022-02-25 RX ADMIN — SODIUM CHLORIDE, SODIUM LACTATE, POTASSIUM CHLORIDE, AND CALCIUM CHLORIDE 75 ML/HR: 600; 310; 30; 20 INJECTION, SOLUTION INTRAVENOUS at 07:54

## 2022-02-25 RX ADMIN — LIDOCAINE HYDROCHLORIDE 60 MG: 20 INJECTION, SOLUTION EPIDURAL; INFILTRATION; INTRACAUDAL; PERINEURAL at 09:33

## 2022-02-25 RX ADMIN — OXYCODONE 5 MG: 5 TABLET ORAL at 17:32

## 2022-02-25 RX ADMIN — FENTANYL CITRATE 25 MCG: 50 INJECTION INTRAMUSCULAR; INTRAVENOUS at 09:47

## 2022-02-25 RX ADMIN — Medication 10 MG: at 09:36

## 2022-02-25 RX ADMIN — MORPHINE SULFATE 1 MG: 4 INJECTION INTRAVENOUS at 08:29

## 2022-02-25 RX ADMIN — PROPOFOL 100 MG: 10 INJECTION, EMULSION INTRAVENOUS at 09:33

## 2022-02-25 RX ADMIN — Medication 5 MG: at 09:45

## 2022-02-25 RX ADMIN — FENTANYL CITRATE 25 MCG: 50 INJECTION INTRAMUSCULAR; INTRAVENOUS at 09:50

## 2022-02-25 RX ADMIN — HYDROMORPHONE HYDROCHLORIDE 0.5 MG: 2 INJECTION, SOLUTION INTRAMUSCULAR; INTRAVENOUS; SUBCUTANEOUS at 10:38

## 2022-02-25 RX ADMIN — ONDANSETRON 4 MG: 2 INJECTION INTRAMUSCULAR; INTRAVENOUS at 09:41

## 2022-02-25 RX ADMIN — Medication 5 MG: at 09:56

## 2022-02-25 RX ADMIN — PHENYLEPHRINE HYDROCHLORIDE 60 MCG: 10 INJECTION INTRAVENOUS at 09:45

## 2022-02-25 RX ADMIN — ASPIRIN 325 MG: 325 TABLET, COATED ORAL at 12:14

## 2022-02-25 RX ADMIN — OXYCODONE 5 MG: 5 TABLET ORAL at 12:14

## 2022-02-25 RX ADMIN — PANTOPRAZOLE SODIUM 40 MG: 40 TABLET, DELAYED RELEASE ORAL at 12:14

## 2022-02-25 RX ADMIN — Medication 1 TABLET: at 17:32

## 2022-02-25 RX ADMIN — SODIUM CHLORIDE, PRESERVATIVE FREE 10 ML: 5 INJECTION INTRAVENOUS at 06:25

## 2022-02-25 RX ADMIN — Medication 2 G: at 09:44

## 2022-02-25 RX ADMIN — PHENYLEPHRINE HYDROCHLORIDE 120 MCG: 10 INJECTION INTRAVENOUS at 09:56

## 2022-02-25 RX ADMIN — PHENYLEPHRINE HYDROCHLORIDE 60 MCG: 10 INJECTION INTRAVENOUS at 09:42

## 2022-02-25 RX ADMIN — SODIUM CHLORIDE, PRESERVATIVE FREE 10 ML: 5 INJECTION INTRAVENOUS at 14:52

## 2022-02-25 RX ADMIN — LISINOPRIL 20 MG: 20 TABLET ORAL at 12:14

## 2022-02-25 NOTE — PERIOP NOTES
TRANSFER - OUT REPORT:    Verbal report given to Michael Cooney  being transferred to [facility] for routine post - op       Report consisted of patients Situation, Background, Assessment and   Recommendations(SBAR). Information from the following report(s) SBAR, OR Summary, Procedure Summary, Intake/Output and MAR was reviewed with the receiving nurse. Lines:   Peripheral IV 02/24/22 Right Forearm (Active)   Site Assessment Clean, dry, & intact 02/25/22 1025   Phlebitis Assessment 0 02/25/22 1025   Infiltration Assessment 0 02/25/22 1025   Dressing Status Clean, dry, & intact 02/25/22 1025   Dressing Type Transparent;Tape 02/25/22 1025   Hub Color/Line Status Pink;Patent 02/25/22 1025   Action Taken Blood drawn 02/24/22 1412   Alcohol Cap Used No 02/25/22 1025        Opportunity for questions and clarification was provided. Patient transported with:   O2 @ 3 liters    VTE prophylaxis orders have been written for Rowena. Patient and family given floor number and nurses name. Family updated re: pt status after security code verified.

## 2022-02-25 NOTE — PROGRESS NOTES
The patient was cleared for surgery by the hospitalist service. I called the OR desk to post the case and spoke to Yesi Willis. She called me back and stated that the patient had lunch and the case could not therefore begin before 8 pm. She offered me time the next day, but I stated that I was available and ready to go at 8. She stated that Dr. Dima Mcintyre felt the patient was too sick to be done that night. I asked to speak to Dr. Dima Mcintyre and he promptly called back. I asked him if he thought the patient was going to be better in terms of her health in the morning. He said no, but he was concerned that because of low staffing levels at night, that proceeding with her case would not be in her best interest.Her dementia and previous combativeness might complicate her anesthesia especially as there might be other patients requiring his direct attention. I defer to his good judgement in this matter. I was able to speak with Dr. Yanci Ramirez who had cared for her previous fracture and he has assumed her care for this new fracture  and he has open block time in the AM which should lead to quicker treatment.

## 2022-02-25 NOTE — PROGRESS NOTES
02/24/22 1820   Dual Skin Pressure Injury Assessment   Dual Skin Pressure Injury Assessment X   Second Care Provider (Based on [address] [place] [address]) Curtis, RN     Pt had a skin tear to L Wrist, Blanchable redness to bottom, scattered busies and skin tears

## 2022-02-25 NOTE — CONSULTS
Consult    Patient: Marisa Zamora MRN: [id number]  SSN: [id number]    YOB: 1940  Age: 80 y.o. Sex: female      Subjective: Marisa Zamora is a 80 y.o. female who fell and injured her left hip. She does have a history of some rather advanced dementia. She actually had a very similar fracture on the right side and December 2021. This was treated with cephalomedullary nail and it appears that she is fallen in and she now has a intertrochanteric fracture on the left side. So it does appear to be a very similar injury that she had on the right. I have spoken with her daughter. The patient herself is not really able to give much history    Past Medical History:   Diagnosis Date    Hypertension     Psychiatric disorder      No past surgical history on file.    FAMHX -No history of inflammatory arthritis   Social History     Tobacco Use    Smoking status: Not on file    Smokeless tobacco: Not on file   Substance Use Topics    Alcohol use: Not Currently      Current Facility-Administered Medications   Medication Dose Route Frequency Provider Last Rate Last Admin    divalproex (DEPAKOTE SPRINKLE) capsule 125 mg  125 mg Oral BID Mitch Gabriel DO   125 mg at 02/24/22 2049    lisinopriL (PRINIVIL, ZESTRIL) tablet 20 mg  20 mg Oral DAILY Mitch Gabriel DO        QUEtiapine (SEROquel) tablet 50 mg  50 mg Oral DAILY Mitch Gabriel DO        rivastigmine (EXELON) 9.5 mg/24 hour patch 1 Patch  1 Patch TransDERmal DAILY Mitch Gabriel DO        ferrous sulfate tablet 325 mg  1 Tablet Oral DAILY WITH BREAKFAST Selina Batres DO        LORazepam (ATIVAN) injection 1 mg  1 mg IntraVENous Q12H PRN Mitch Gabriel DO        pantoprazole (PROTONIX) tablet 40 mg  40 mg Oral DAILY Mitch Gabriel DO        sodium chloride (NS) flush 5-40 mL  5-40 mL IntraVENous Q8H Selina Batres DO   10 mL at 02/25/22 6154    sodium chloride (NS) flush 5-40 mL  5-40 mL IntraVENous PRN Fontaine Kitchen, DO        acetaminophen (TYLENOL) tablet 650 mg  650 mg Oral Q6H PRN Fontaine Kitchen, DO        Or    acetaminophen (TYLENOL) suppository 650 mg  650 mg Rectal Q6H PRN Fontaine Kitchen, DO        polyethylene glycol (MIRALAX) packet 17 g  17 g Oral DAILY PRN Fontaine Kitchen, DO        ondansetron (ZOFRAN ODT) tablet 4 mg  4 mg Oral Q8H PRN Fontaine Kitchen, DO        Or    ondansetron [facility]) injection 4 mg  4 mg IntraVENous Q6H PRN Fontaine Kitchen, DO        morphine injection 1 mg  1 mg IntraVENous Q4H PRN Fontaine Kitchen, DO        oxyCODONE IR (ROXICODONE) tablet 5 mg  5 mg Oral Q4H PRN Fontaine Kitchen, DO   5 mg at 02/24/22 2049    tuberculin injection 5 Units  5 Units IntraDERMal McNeal Bombard, DO   5 Units at 02/24/22 1830    ceFAZolin (ANCEF) 2 g/20 mL in sterile water IV syringe  2 g IntraVENous ON CALL TO OR Jaleesa Bishop MD            No Known Allergies    Review of Systems:  A comprehensive review of systems was negative except for that written in the History of Present Illness. Objective:     Vitals:    02/24/22 2048 02/24/22 2338 02/25/22 0331 02/25/22 0701   BP: 138/86 (!) 152/93 132/82 125/73   Pulse: (!) 103 (!) 107 (!) 103 89   Resp: 18 18 20 16   Temp: 97.5 °F (36.4 °C) 98.2 °F (36.8 °C) 98.3 °F (36.8 °C) 98 °F (36.7 °C)   SpO2: 97% 97% 98% 96%   Weight:    75.3 kg (166 lb)   Height:    5' 8\" (1.727 m)        Physical Exam:  Physical Exam:  General:  Alert, cooperative, no distress, appears stated age. Orientation alert but not oriented   Eyes:  Conjunctivae/corneas clear. PERRL, EOMs intact. Fundi benign   Ears:  Normal TMs and external ear canals both ears. Nose: Nares normal. Septum midline. Mucosa normal. No drainage or sinus tenderness.    Mouth/Throat: Lips, mucosa, and tongue normal. Teeth and gums normal.   Neck: Supple, symmetrical, trachea midline, no adenopathy, thyroid: no enlargment/tenderness/nodules, no carotid bruit and no JVD. Back:   Symmetric, no curvature. ROM normal. No CVA tenderness. Lungs:   Clear to auscultation bilaterally. Heart:  Regular rate and rhythm, S1, S2 normal, no murmur, click, rub or gallop. Abdomen:   Soft, non-tender. Bowel sounds normal. No masses,  No organomegaly. No lymphadenopathy in all 4 extremities  Alignment- pt has some obvious deformity of the left hip  Range of motion- with any range of motion leftt hip  Vascular-distal pulses palpable in left lower extremity  Sensory/motor-deep tendon reflexes normal left lower extremity. Motor and sensory function intact. Stability- is difficult to assess stability because of the presence of the fracture  Tenderness to palpation over the left greater trochanter area  Skin- no rashes ulcerations or open wounds left lower extremity  Gait- cannot put any weight on the left lower extremity      Assessment:     Hospital Problems  Date Reviewed: 2/15/2022          Codes Class Noted POA    * (Principal) Closed comminuted intertrochanteric fracture of proximal femur, left, initial encounter [facility]) ICD-10-CM: M20.647U  ICD-9-CM: 820.21  2/24/2022 Unknown        HTN (hypertension) ICD-10-CM: I10  ICD-9-CM: 401.9  2/24/2022 Unknown        Psychiatric disorder ICD-10-CM: Q09  ICD-9-CM: 298.9  Unknown Yes            Closed displaced intertrochanteric proximal femur fracture    Xrays and or studies:    AP and lateral views of the left hip shows an intertrochanteric left proximal femur fracture  Plan:     I think at this point that the most reasonable thing would go ahead and proceed with operative intervention with cephalomedullary nail fixation of her left fracture. I spoke with her daughter this morning try to explain to her that it would be essentially be the same injury she had in the same surgery she had just a couple months ago only on the left side this time. After talking her daughter she seems understand this.   The plan will be to proceed with open treatment of left proximal femur fracture with intramedullary nail fixation today in the operating room    Signed By: Perfecto Young MD

## 2022-02-25 NOTE — OP NOTES
Operative Report    Patient: Shadia Posadas MRN: [id number]  SSN: [id number]    YOB: 1940  Age: 80 y.o. Sex: female       Date of Surgery: 2/25/2022     History:  Shadia Posadas is a 80 y.o. female fell and injured her left hip. She was seen emergency room and found to have a left intertrochanteric proximal femur fracture. She had had a similar injury in December on the right side. I talked to her daughter about this was essentially the same procedure. She seemed to feel comfortable consenting. I talked to the patient and/or their representative and explained the exact nature the procedure. I also went through a detailed list of the material risks associated with  the procedure which included risk of bleeding, infection, injury to nearby structures, worsening the situation, as well as the risks associate with anesthesia and finally death. Also talked with him regarding the benefits and alternatives to the procedure. Preoperative Diagnosis: left hip fracture     Postoperative Diagnosis: Left closed displaced intertrochanteric proximal femur fracture    Surgeon(s) and Role:     * Mari Morales MD - Primary    Anesthesia: Spinal     Procedure: Procedure(s):  Open treatment of left intertrochanteric proximal femur fracture with intramedullary nail fixation    Procedure in Detail: After successful  induction of general anesthetic the left lower extremity was placed in gentle boot traction on a fracture table and we made sure we could adequately visualize the osteoporotic proximal femur and that an adequate closed reduction could be obtained.   I then prepped and draped the left hip and thigh area and made a small incision just proximal to the area the greater trochanter and placed the cannulated awl into the tip the greater trochanter on both the AP and lateral projection and once it was in appropriate position placed a guidewire down the shaft of the femur and then reamed sequentially up to 13 mm for the shaft of the femur and to 15.5 mm proximally. I then measured for a 420 mm nail and placed the actual nail. Once the nail was in appropriate position I placed a guidewire in the center of the femoral head on both the AP and lateral projection. Once this was in appropriate position I drilled for and placed a 100 mm lag screw proximally. Once this was in position I then placed a set screw in the proximal portion of the nail and tightened this all the way down and backed off a half of a turn to allow for dynamic compression. I then placed a single interlock bolt distally using freehand technique. I then removed the insertion handle and checked the final reduction as well as the placement of the hardware. I was very pleased with this I then closed incisions with 2.0 Monocryl for the subcutaneous tissue and staples for the skin. Dressings were applied. The patient was awakened and taken recovery in stable condition there were no apparent complications      Estimated Blood Loss: 120 cc    Tourniquet Time: * No tourniquets in log *      Implants:   Implant Name Type Inv. Item Serial No.  Lot No. LRB No. Used Action   NAIL IM [id number] 125DEG LNG L FEM HIP TI GAMMA3 R1.5 - [id number]  NAIL IM [id number] [id number] 125DEG LNG L FEM HIP TI GAMMA3 R1.5  [employer] [id number] Left 1 Implanted   SCR BNE LCK T2 FT 5X60MM TI -- STRL - [id number]  SCR BNE LCK T2 FT 5X60MM TI -- STRL  [employer] [id number] Left 1 Implanted   SCR BNE LAG GAMMA 3 [id number] -- TI STRL - [id number]  SCR BNE LAG GAMMA 3 [id number] -- TI STRL  [employer] [id number] Left 1 Implanted               Specimens: * No specimens in log *        Drains: None                Complications: None    Counts: Sponge and needle counts were correct times two.     Signed By:  Anupam Carson MD     February 25, 2022

## 2022-02-25 NOTE — PROGRESS NOTES
Hospitalist Progress Note   Admit Date:  2022  2:01 PM   Name:  Ivette Cleaning   Age:  80 y.o. Sex:  female  :  1940   MRN:  [id number]   Room:  [facility]    Presenting Complaint: Hip Injury    Reason(s) for Admission: Closed comminuted intertrochanteric fracture of proximal femur, left, initial encounter [facility] Course & Interval History:   Ms. Luisa Carl is a 80 y.o. female with a history of hypertension, dementia, and R hip fracture in Dec/2021 who presented to the ER from [facility] after it was discovered she had an intertrochanteric fracture after a fall. The patient's daughter states her mother is at baseline and is unable to carry on a conversation. The patient's code status is DNR. XR hip showed a comminuted and angulated intertrochanteric fracture of the left hip. She was admitted to the Hospitalist service. Ortho surgery was consulted and the patient was taken to the OR on .     Subjective/24hr Events (22): Patient seen after surgery. She is calm and in NAD. IVF on. Follow a.m. labs. ROS:  10 systems reviewed and negative except as noted above. Assessment & Plan:     Principal Problem:  Closed comminuted intertrochanteric fracture of proximal femur, left, initial encounter ([facility] [address] (2022)  POD0, s/p ORIF  - Fall risk  - Ortho surgery on board  - PRN analgesia  - UA negative  - Follow a.m. labs  - IVF  - PT/OT/PPD     Active Problems:  Behavioral disorder  Dementia  - Continue Depakote BID. Seroquel.   Exelon patch.      HTN  - Continue home lisinopril    Normocytic anemia  - Trend CBC      Dispo/Discharge Plannin-3 midnights    Diet:  ADULT DIET Regular  DVT PPx: ASA per Ortho  Code status: DNR    Hospital Problems as of 2022 Date Reviewed: 2/15/2022          Codes Class Noted - Resolved POA    * (Principal) Closed comminuted intertrochanteric fracture of proximal femur, left, initial encounter ([facility] [address] ICD-10-CM: S56.544E  ICD-9-CM: 820.21  2/24/2022 - Present Unknown        HTN (hypertension) ICD-10-CM: I10  ICD-9-CM: 401.9  2/24/2022 - Present Unknown        Psychiatric disorder ICD-10-CM: N07  ICD-9-CM: 298.9  Unknown - Present Yes              Objective:     Patient Vitals for the past 24 hrs:   Temp Pulse Resp BP SpO2   02/25/22 1151 98.2 °F (36.8 °C) 92 16 118/70 98 %   02/25/22 1105 -- 94 16 122/66 99 %   02/25/22 1100 98.3 °F (36.8 °C) 90 16 115/64 98 %   02/25/22 1055 -- 83 16 128/61 98 %   02/25/22 1050 -- 90 16 (!) 126/58 99 %   02/25/22 1044 -- 93 16 117/61 98 %   02/25/22 1039 -- 93 16 126/66 96 %   02/25/22 1034 -- 100 18 136/81 97 %   02/25/22 1029 -- 87 16 107/65 98 %   02/25/22 1025 98.2 °F (36.8 °C) 87 16 112/67 100 %   02/25/22 0701 98 °F (36.7 °C) 89 16 125/73 96 %   02/25/22 0331 98.3 °F (36.8 °C) (!) 103 20 132/82 98 %   02/24/22 2338 98.2 °F (36.8 °C) (!) 107 18 (!) 152/93 97 %   02/24/22 2048 97.5 °F (36.4 °C) (!) 103 18 138/86 97 %   02/24/22 1804 -- (!) 104 19 (!) 147/89 --   02/24/22 1628 -- 100 17 (!) 146/94 --   02/24/22 1405 98 °F (36.7 °C) (!) 101 14 132/87 93 %     Oxygen Therapy  O2 Sat (%): 98 % (02/25/22 1151)  Pulse via Oximetry: 91 beats per minute (02/25/22 1105)  O2 Device: Nasal cannula (02/25/22 1151)  O2 Flow Rate (L/min): 3 l/min (02/25/22 1100)    Estimated body mass index is 25.24 kg/m² as calculated from the following:    Height as of this encounter: 5' 8\" (1.727 m). Weight as of this encounter: 75.3 kg (166 lb). Intake/Output Summary (Last 24 hours) at 2/25/2022 1317  Last data filed at 2/25/2022 0958  Gross per 24 hour   Intake 500 ml   Output 200 ml   Net 300 ml         Physical Exam:   Blood pressure 118/70, pulse 92, temperature 98.2 °F (36.8 °C), resp. rate 16, height 5' 8\" (1.727 m), weight 75.3 kg (166 lb), SpO2 98 %. General:    No acute distress  Head:  Normocephalic, atraumatic  Eyes:  Sclerae appear normal.  Pupils equally round. ENT:  Nares appear normal, no drainage.   Moist oral mucosa  Neck:  No restricted ROM. Trachea midline   CV:   RRR. No m/r/g. Lungs:   CTAB. No wheezing, rhonchi, or rales. Respirations even, unlabored  Abdomen:   Soft, nondistended. Extremities: No cyanosis or clubbing. Skin:     No rashes and normal coloration. Warm and dry. Neuro:  CN II-XII grossly intact. Awake. Psych:  Calm    I have reviewed ordered lab tests and independently visualized imaging below:    Recent Labs:  Recent Results (from the past 48 hour(s))   EKG, 12 LEAD, INITIAL    Collection Time: 02/24/22  2:07 PM   Result Value Ref Range    Ventricular Rate 101 BPM    Atrial Rate 103 BPM    P-R Interval 217 ms    QRS Duration 98 ms    Q-T Interval 359 ms    QTC Calculation (Bezet) 466 ms    Calculated P Axis 41 degrees    Calculated R Axis -69 degrees    Calculated T Axis 55 degrees    Diagnosis       Sinus tachycardia  Prolonged IA interval  LAD, consider left anterior fascicular block  Abnormal R-wave progression, early transition  Borderline repolarization abnormality    Confirmed by Leopold Coast MD, Jessica Pathak ([id number]) on 2/24/2022 2:27:41 PM     CBC WITH AUTOMATED DIFF    Collection Time: 02/24/22  2:15 PM   Result Value Ref Range    WBC 8.6 4.3 - 11.1 K/uL    RBC 3.67 (L) 4.05 - 5.2 M/uL    HGB 10.7 (L) 11.7 - 15.4 g/dL    HCT 34.4 (L) 35.8 - 46.3 %    MCV 93.7 79.6 - 97.8 FL    MCH 29.2 26.1 - 32.9 PG    MCHC 31.1 (L) 31.4 - 35.0 g/dL    RDW 14.1 11.9 - 14.6 %    PLATELET 741 707 - 963 K/uL    MPV 9.7 9.4 - 12.3 FL    ABSOLUTE NRBC 0.00 0.0 - 0.2 K/uL    DF AUTOMATED      NEUTROPHILS 88 (H) 43 - 78 %    LYMPHOCYTES 5 (L) 13 - 44 %    MONOCYTES 6 4.0 - 12.0 %    EOSINOPHILS 0 (L) 0.5 - 7.8 %    BASOPHILS 0 0.0 - 2.0 %    IMMATURE GRANULOCYTES 1 0.0 - 5.0 %    ABS. NEUTROPHILS 7.6 1.7 - 8.2 K/UL    ABS. LYMPHOCYTES 0.5 0.5 - 4.6 K/UL    ABS. MONOCYTES 0.5 0.1 - 1.3 K/UL    ABS. EOSINOPHILS 0.0 0.0 - 0.8 K/UL    ABS. BASOPHILS 0.0 0.0 - 0.2 K/UL    ABS. IMM.  GRANS. 0.1 0.0 - 0.5 K/UL METABOLIC PANEL, COMPREHENSIVE    Collection Time: 02/24/22  2:15 PM   Result Value Ref Range    Sodium 140 136 - 145 mmol/L    Potassium 3.7 3.5 - 5.1 mmol/L    Chloride 110 (H) 98 - 107 mmol/L    CO2 28 21 - 32 mmol/L    Anion gap 2 (L) 7 - 16 mmol/L    Glucose 158 (H) 65 - 100 mg/dL    BUN 33 (H) 8 - 23 MG/DL    Creatinine 0.60 0.6 - 1.0 MG/DL    GFR est AA >60 >60 ml/min/1.73m2    GFR est non-AA >60 >60 ml/min/1.73m2    Calcium 9.6 8.3 - 10.4 MG/DL    Bilirubin, total 0.4 0.2 - 1.1 MG/DL    ALT (SGPT) 21 12 - 65 U/L    AST (SGOT) 13 (L) 15 - 37 U/L    Alk.  phosphatase 169 (H) 50 - 136 U/L    Protein, total 6.9 6.3 - 8.2 g/dL    Albumin 3.5 3.2 - 4.6 g/dL    Globulin 3.4 2.3 - 3.5 g/dL    A-G Ratio 1.0 (L) 1.2 - 3.5     TYPE & SCREEN    Collection Time: 02/24/22  2:15 PM   Result Value Ref Range    Crossmatch Expiration 02/27/2022,2359     ABO/Rh(D) Tally Stade POSITIVE     Antibody screen NEG    PROTHROMBIN TIME + INR    Collection Time: 02/24/22  2:15 PM   Result Value Ref Range    Prothrombin time 14.3 12.6 - 14.5 sec    INR 1.1     URINALYSIS W/ RFLX MICROSCOPIC    Collection Time: 02/24/22  3:50 PM   Result Value Ref Range    Color YELLOW      Appearance CLEAR      Specific gravity 1.028 (H) 1.001 - 1.023      pH (UA) 6.0 5.0 - 9.0      Protein Negative NEG mg/dL    Glucose Negative mg/dL    Ketone Negative NEG mg/dL    Bilirubin Negative NEG      Blood Negative NEG      Urobilinogen 1.0 0.2 - 1.0 EU/dL    Nitrites Negative NEG      Leukocyte Esterase Negative NEG     COVID-19 RAPID TEST    Collection Time: 02/24/22  5:27 PM   Result Value Ref Range    Specimen source NASAL      COVID-19 rapid test Not detected NOTD     METABOLIC PANEL, BASIC    Collection Time: 02/25/22  5:19 AM   Result Value Ref Range    Sodium 141 136 - 145 mmol/L    Potassium 3.7 3.5 - 5.1 mmol/L    Chloride 107 98 - 107 mmol/L    CO2 27 21 - 32 mmol/L    Anion gap 7 7 - 16 mmol/L    Glucose 117 (H) 65 - 100 mg/dL    BUN 28 (H) 8 - 23 MG/DL Creatinine 0.40 (L) 0.6 - 1.0 MG/DL    GFR est AA >60 >60 ml/min/1.73m2    GFR est non-AA >60 >60 ml/min/1.73m2    Calcium 9.4 8.3 - 10.4 MG/DL       All Micro Results     Procedure Component Value Units Date/Time    COVID-19 RAPID TEST [[id number]] Collected: 02/24/22 1727    Order Status: Completed Specimen: Nasopharyngeal Updated: 02/24/22 1802     Specimen source NASAL        Comment: RAPID        COVID-19 rapid test Not detected        Comment:      The specimen is NEGATIVE for SARS-CoV-2, the novel coronavirus associated with COVID-19. A negative result does not rule out COVID-19. This test has been authorized by the FDA under an Emergency Use Authorization (EUA) for use by authorized laboratories. Fact sheet for Healthcare Providers: ConventionFreenomdate.co.nz  Fact sheet for Patients: mycujoo.co.nz       Methodology: Isothermal Nucleic Acid Amplification               Other Studies:  XR HIP LT W OR WO PELV 2-3 VWS    Result Date: 2/25/2022  Left Hip INDICATION: Postoperative x-ray following reduction of left hip fracture. COMPARISON: Pelvis and left hip x-ray 2/24/2022. Postoperative changes from left intramedullary femoral jennifer placement and left dynamic hip screw reduction of a comminuted intertrochanteric left hip fracture. Intramedullary jennifer and dynamic right hip screw also noted and appear stable. Hips are located bilaterally. XR HIP LT W OR WO PELV 2-3 VWS    Result Date: 2/24/2022  3 VIEW PELVIS AND LEFT HIP, TWO-VIEW LEFT FEMUR: CLINICAL HISTORY:  Left hip pain after fall this morning. COMPARISON:  Right femur of February 15, 2022 and earlier studies. FINDINGS:  There is a comminuted intertrochanteric fracture of the proximal left femur with little displacement but significant varus angulation of the principal distal fragment. Left femur appears intact distally as imaged, with degenerative changes at the knee including chondrocalcinosis. Healing right femoral intertrochanteric fracture status post ORIF is again noted. The pelvic ring appears intact in the AP projection, although underlying osteopenia and overlying stool limits evaluation for possible nondisplaced fractures. Comminuted and angulated intertrochanteric fracture of the left hip. XR FEMUR LT 2 V    Result Date: 2/25/2022  Left femur INDICATION: Intraoperative C-arm views during ORIF left hip fracture. 38 seconds fluoroscopy time. 6 images obtained. Intraoperative C-arm views during placement of an intramedullary jennifer and dynamic left hip screw to reduce an intertrochanteric left hip fracture. XR FEMUR LT 2 V    Result Date: 2/24/2022  3 VIEW PELVIS AND LEFT HIP, TWO-VIEW LEFT FEMUR: CLINICAL HISTORY:  Left hip pain after fall this morning. COMPARISON:  Right femur of February 15, 2022 and earlier studies. FINDINGS:  There is a comminuted intertrochanteric fracture of the proximal left femur with little displacement but significant varus angulation of the principal distal fragment. Left femur appears intact distally as imaged, with degenerative changes at the knee including chondrocalcinosis. Healing right femoral intertrochanteric fracture status post ORIF is again noted. The pelvic ring appears intact in the AP projection, although underlying osteopenia and overlying stool limits evaluation for possible nondisplaced fractures. Comminuted and angulated intertrochanteric fracture of the left hip. XR CHEST PORT    Result Date: 2/24/2022  PORTABLE CHEST, February 24, 2022 at 1435 hours CLINICAL HISTORY:  Preoperative evaluation for hip fracture from fall. COMPARISON:  December 15, 2021. FINDINGS:  AP supine image demonstrates no confluent infiltrate or significant pleural fluid. The heart size is within normal limits with atherosclerotic calcification and tortuosity of the aorta but no evidence of congestive heart failure or pneumothorax.   The bony thorax appears intact on this view. There are overlying radiopaque support devices. NO ACUTE CARDIOPULMONARY DISEASE OR BONY ABNORMALITY IDENTIFIED. Current Meds:  Current Facility-Administered Medications   Medication Dose Route Frequency    morphine injection 2 mg  2 mg IntraVENous Q1H PRN    ceFAZolin (ANCEF) 1 g in 0.9% sodium chloride (MBP/ADV) 50 mL MBP  1 g IntraVENous Q8H    alum-mag hydroxide-simeth (MYLANTA) oral suspension 30 mL  30 mL Oral Q4H PRN    calcium-vitamin D (OS-FELICE +D3) 500 mg-200 unit per tablet 1 Tablet  1 Tablet Oral TID WITH MEALS    aspirin delayed-release tablet 325 mg  325 mg Oral DAILY    divalproex (DEPAKOTE SPRINKLE) capsule 125 mg  125 mg Oral BID    lisinopriL (PRINIVIL, ZESTRIL) tablet 20 mg  20 mg Oral DAILY    QUEtiapine (SEROquel) tablet 50 mg  50 mg Oral DAILY    rivastigmine (EXELON) 9.5 mg/24 hour patch 1 Patch  1 Patch TransDERmal DAILY    ferrous sulfate tablet 325 mg  1 Tablet Oral DAILY WITH BREAKFAST    LORazepam (ATIVAN) injection 1 mg  1 mg IntraVENous Q12H PRN    pantoprazole (PROTONIX) tablet 40 mg  40 mg Oral DAILY    sodium chloride (NS) flush 5-40 mL  5-40 mL IntraVENous Q8H    sodium chloride (NS) flush 5-40 mL  5-40 mL IntraVENous PRN    acetaminophen (TYLENOL) tablet 650 mg  650 mg Oral Q6H PRN    Or    acetaminophen (TYLENOL) suppository 650 mg  650 mg Rectal Q6H PRN    polyethylene glycol (MIRALAX) packet 17 g  17 g Oral DAILY PRN    ondansetron (ZOFRAN ODT) tablet 4 mg  4 mg Oral Q8H PRN    Or    ondansetron (ZOFRAN) injection 4 mg  4 mg IntraVENous Q6H PRN    oxyCODONE IR (ROXICODONE) tablet 5 mg  5 mg Oral Q4H PRN    tuberculin injection 5 Units  5 Units IntraDERMal ONCE       Signed:  Miriam Prescott NP    Part of this note may have been written by using a voice dictation software. The note has been proof read but may still contain some grammatical/other typographical errors.

## 2022-02-25 NOTE — PROGRESS NOTES
Problem: Falls - Risk of  Goal: *Absence of Falls  Description: Document Sofia Fothergill Fall Risk and appropriate interventions in the flowsheet.   Outcome: Progressing Towards Goal  Note: Fall Risk Interventions:       Mentation Interventions: Bed/chair exit alarm,Reorient patient    Medication Interventions: Evaluate medications/consider consulting pharmacy    Elimination Interventions: Bed/chair exit alarm,Call light in reach    History of Falls Interventions: Bed/chair exit alarm

## 2022-02-25 NOTE — ANESTHESIA POSTPROCEDURE EVALUATION
Procedure(s): FEMORAL INTERTROCHANTERIC NAIL INSERTION. general    Anesthesia Post Evaluation      Multimodal analgesia: multimodal analgesia used between 6 hours prior to anesthesia start to PACU discharge  Patient location during evaluation: PACU  Patient participation: complete - patient participated  Post-procedure mental status: at baseline. Pain management: adequate  Airway patency: patent  Anesthetic complications: no  Cardiovascular status: acceptable  Respiratory status: acceptable, spontaneous ventilation and nonlabored ventilation  Hydration status: acceptable  Post anesthesia nausea and vomiting:  none      INITIAL Post-op Vital signs:   Vitals Value Taken Time   /61 02/25/22 1054   Temp 36.8 °C (98.2 °F) 02/25/22 1025   Pulse 93 02/25/22 1055   Resp 16 02/25/22 1050   SpO2 98 % 02/25/22 1055   Vitals shown include unvalidated device data.

## 2022-02-25 NOTE — PROGRESS NOTES
Problem: Falls - Risk of  Goal: *Absence of Falls  Description: Document Keith Koehler Fall Risk and appropriate interventions in the flowsheet.   Outcome: Progressing Towards Goal  Note: Fall Risk Interventions:

## 2022-02-25 NOTE — PROGRESS NOTES
Pt is an 79 yo female admitted for surgical repair of a hip fracture. Pt underwent a ORIF/TFN today. SW spoke with pt's dtr, Zoë Hoffmann, via phone. She confirmed that the pt is now a long term care resident at [facility]. Her bed is on a 10 day bedhold. SW contacted [facility] admissions to confirm that pt can return when medically ready (anticipate Sunday which is POD2). PPD not needed to return. Rapid COVID will be required. SW following to facilitate pt's return to SNF when she is medically cleared for dc. Care Management Interventions  PCP Verified by CM:  Yes  Mode of Transport at Discharge: BLS  Transition of Care Consult (CM Consult): Discharge Planning,SNF  Partner SNF: Yes  Discharge Durable Medical Equipment: No  Physical Therapy Consult: Yes  Occupational Therapy Consult: Yes  Speech Therapy Consult: No  Support Systems: Skilled Nursing Facility,Child(carlos eduardo)  Confirm Follow Up Transport: Family  The Plan for Transition of Care is Related to the Following Treatment Goals : STR to improve pt's strength and functional abilities for a safe transition to the next level of care  The Patient and/or Patient Representative was Provided with a Choice of Provider and Agrees with the Discharge Plan?: Yes  Freedom of Choice List was Provided with Basic Dialogue that Supports the Patient's Individualized Plan of Care/Goals, Treatment Preferences and Shares the Quality Data Associated with the Providers?: Yes  Discharge Location  Patient Expects to be Discharged to[de-identified] Rehab Unit Subacute ([facility])

## 2022-02-26 VITALS
SYSTOLIC BLOOD PRESSURE: 90 MMHG | HEART RATE: 92 BPM | BODY MASS INDEX: 25.16 KG/M2 | OXYGEN SATURATION: 96 % | HEIGHT: 68 IN | WEIGHT: 166 LBS | RESPIRATION RATE: 17 BRPM | TEMPERATURE: 97.6 F | DIASTOLIC BLOOD PRESSURE: 60 MMHG

## 2022-02-26 LAB
BASOPHILS # BLD: 0 K/UL (ref 0–0.2)
BASOPHILS NFR BLD: 0 % (ref 0–2)
COVID-19 RAPID TEST, COVR: NOT DETECTED
DIFFERENTIAL METHOD BLD: ABNORMAL
EOSINOPHIL # BLD: 0 K/UL (ref 0–0.8)
EOSINOPHIL NFR BLD: 1 % (ref 0.5–7.8)
ERYTHROCYTE [DISTWIDTH] IN BLOOD BY AUTOMATED COUNT: 14.2 % (ref 11.9–14.6)
HCT VFR BLD AUTO: 26.3 % (ref 35.8–46.3)
HGB BLD-MCNC: 8.4 G/DL (ref 11.7–15.4)
IMM GRANULOCYTES # BLD AUTO: 0 K/UL (ref 0–0.5)
IMM GRANULOCYTES NFR BLD AUTO: 0 % (ref 0–5)
LYMPHOCYTES # BLD: 0.8 K/UL (ref 0.5–4.6)
LYMPHOCYTES NFR BLD: 11 % (ref 13–44)
MCH RBC QN AUTO: 30.3 PG (ref 26.1–32.9)
MCHC RBC AUTO-ENTMCNC: 31.9 G/DL (ref 31.4–35)
MCV RBC AUTO: 94.9 FL (ref 79.6–97.8)
MM INDURATION POC: 0 MM (ref 0–5)
MONOCYTES # BLD: 0.6 K/UL (ref 0.1–1.3)
MONOCYTES NFR BLD: 8 % (ref 4–12)
NEUTS SEG # BLD: 5.7 K/UL (ref 1.7–8.2)
NEUTS SEG NFR BLD: 80 % (ref 43–78)
NRBC # BLD: 0 K/UL (ref 0–0.2)
PLATELET # BLD AUTO: 157 K/UL (ref 150–450)
PMV BLD AUTO: 10.5 FL (ref 9.4–12.3)
PPD POC: NEGATIVE NEGATIVE
RBC # BLD AUTO: 2.77 M/UL (ref 4.05–5.2)
SOURCE, COVRS: NORMAL
WBC # BLD AUTO: 7.1 K/UL (ref 4.3–11.1)

## 2022-02-26 PROCEDURE — 74011000258 HC RX REV CODE- 258: Performed by: ORTHOPAEDIC SURGERY

## 2022-02-26 PROCEDURE — 36415 COLL VENOUS BLD VENIPUNCTURE: CPT

## 2022-02-26 PROCEDURE — 87635 SARS-COV-2 COVID-19 AMP PRB: CPT

## 2022-02-26 PROCEDURE — 74011250636 HC RX REV CODE- 250/636: Performed by: ORTHOPAEDIC SURGERY

## 2022-02-26 PROCEDURE — 74011250637 HC RX REV CODE- 250/637: Performed by: ORTHOPAEDIC SURGERY

## 2022-02-26 PROCEDURE — 74011000250 HC RX REV CODE- 250: Performed by: ORTHOPAEDIC SURGERY

## 2022-02-26 PROCEDURE — 85025 COMPLETE CBC W/AUTO DIFF WBC: CPT

## 2022-02-26 PROCEDURE — 97530 THERAPEUTIC ACTIVITIES: CPT

## 2022-02-26 PROCEDURE — 74011250636 HC RX REV CODE- 250/636: Performed by: NURSE PRACTITIONER

## 2022-02-26 PROCEDURE — 97161 PT EVAL LOW COMPLEX 20 MIN: CPT

## 2022-02-26 RX ORDER — RIVASTIGMINE 9.5 MG/24H
1 PATCH, EXTENDED RELEASE TRANSDERMAL DAILY
Qty: 30 PATCH | Refills: 0 | Status: SHIPPED | OUTPATIENT
Start: 2022-02-27 | End: 2022-03-29

## 2022-02-26 RX ORDER — FERROUS SULFATE, DRIED 160(50) MG
1 TABLET, EXTENDED RELEASE ORAL
Qty: 90 TABLET | Refills: 0 | Status: SHIPPED | OUTPATIENT
Start: 2022-02-26 | End: 2022-03-28

## 2022-02-26 RX ORDER — LISINOPRIL 10 MG/1
10 TABLET ORAL DAILY
Qty: 30 TABLET | Refills: 0 | Status: SHIPPED | OUTPATIENT
Start: 2022-02-26 | End: 2022-03-28

## 2022-02-26 RX ORDER — ASPIRIN 325 MG
325 TABLET, DELAYED RELEASE (ENTERIC COATED) ORAL DAILY
Qty: 30 TABLET | Refills: 0 | Status: SHIPPED | OUTPATIENT
Start: 2022-02-27 | End: 2022-04-01

## 2022-02-26 RX ORDER — OXYCODONE HYDROCHLORIDE 5 MG/1
5 TABLET ORAL
Qty: 12 TABLET | Refills: 0 | Status: SHIPPED | OUTPATIENT
Start: 2022-02-26 | End: 2022-03-01

## 2022-02-26 RX ORDER — LANOLIN ALCOHOL/MO/W.PET/CERES
325 CREAM (GRAM) TOPICAL
Qty: 30 TABLET | Refills: 0 | Status: SHIPPED | OUTPATIENT
Start: 2022-02-27 | End: 2022-03-29

## 2022-02-26 RX ORDER — PANTOPRAZOLE SODIUM 40 MG/1
40 TABLET, DELAYED RELEASE ORAL DAILY
Qty: 30 TABLET | Refills: 0 | Status: SHIPPED | OUTPATIENT
Start: 2022-02-27 | End: 2022-03-29

## 2022-02-26 RX ORDER — POLYETHYLENE GLYCOL 3350 17 G/17G
17 POWDER, FOR SOLUTION ORAL
Qty: 1 EACH | Refills: 0 | Status: SHIPPED | OUTPATIENT
Start: 2022-02-26 | End: 2022-03-28

## 2022-02-26 RX ORDER — ACETAMINOPHEN 325 MG/1
650 TABLET ORAL
Qty: 120 TABLET | Refills: 0 | Status: SHIPPED | OUTPATIENT
Start: 2022-02-26 | End: 2022-03-28

## 2022-02-26 RX ADMIN — PANTOPRAZOLE SODIUM 40 MG: 40 TABLET, DELAYED RELEASE ORAL at 08:06

## 2022-02-26 RX ADMIN — CEFAZOLIN SODIUM 1 G: 1 INJECTION, POWDER, FOR SOLUTION INTRAMUSCULAR; INTRAVENOUS at 02:46

## 2022-02-26 RX ADMIN — QUETIAPINE FUMARATE 50 MG: 25 TABLET ORAL at 08:06

## 2022-02-26 RX ADMIN — Medication 1 TABLET: at 10:35

## 2022-02-26 RX ADMIN — FERROUS SULFATE TAB 325 MG (65 MG ELEMENTAL FE) 325 MG: 325 (65 FE) TAB at 08:07

## 2022-02-26 RX ADMIN — Medication 1 TABLET: at 08:07

## 2022-02-26 RX ADMIN — DIVALPROEX SODIUM 125 MG: 125 CAPSULE ORAL at 08:07

## 2022-02-26 RX ADMIN — SODIUM CHLORIDE, PRESERVATIVE FREE 5 ML: 5 INJECTION INTRAVENOUS at 13:00

## 2022-02-26 RX ADMIN — SODIUM CHLORIDE, SODIUM LACTATE, POTASSIUM CHLORIDE, AND CALCIUM CHLORIDE 50 ML/HR: 600; 310; 30; 20 INJECTION, SOLUTION INTRAVENOUS at 05:49

## 2022-02-26 RX ADMIN — LISINOPRIL 20 MG: 20 TABLET ORAL at 08:06

## 2022-02-26 RX ADMIN — ASPIRIN 325 MG: 325 TABLET, COATED ORAL at 08:06

## 2022-02-26 RX ADMIN — ACETAMINOPHEN 650 MG: 325 TABLET ORAL at 08:07

## 2022-02-26 NOTE — PROGRESS NOTES
ACUTE PHYSICAL THERAPY GOALS:  (Developed with and agreed upon by patient and/or caregiver. )  LTG:  (1.)Ms. Orin Harry will move from supine to sit and sit to supine , scoot up and down and roll side to side in bed with MINIMAL ASSIST within 7 treatment day(s). (2.)Ms. Orin Harry will transfer from bed to chair and chair to bed with MINIMAL ASSIST using the least restrictive device within 7 treatment day(s). (3.)Ms. Orin Harry will ambulate with MINIMAL ASSIST for 75 feet with the least restrictive device within 7 treatment day(s). (4.)Ms. Sin will maintain static/dynamic sitting x 15 minutes with SUPERVISION for improved balance within 7 treatment days.       ________________________________________________________________________________________________          PHYSICAL THERAPY ASSESSMENT: Initial Assessment and AM PT Treatment Day # 1      Salena Goodpasture is a 80 y.o. female   PRIMARY DIAGNOSIS: Closed comminuted intertrochanteric fracture of proximal femur, left, initial encounter ([facility] [address]  Closed comminuted intertrochanteric fracture of proximal femur, left, initial encounter ([facility] [address] [S72.142A]  Procedure(s) (LRB):  FEMORAL INTERTROCHANTERIC NAIL INSERTION (Left)  1 Day Post-Op  Reason for Referral:    ICD-10: Treatment Diagnosis: Generalized Muscle Weakness (M62.81)  Difficulty in walking, Not elsewhere classified (R26.2)  History of falling (Z91.81)  [place]PAT[place] / [place] / Product Type: Medicare /     ASSESSMENT:     REHAB RECOMMENDATIONS:   Recommendation to date pending progress:  Setting:   Short-term Rehab  Equipment:    To Be Determined     PRIOR LEVEL OF FUNCTION:  (Prior to Hospitalization) INITIAL/CURRENT LEVEL OF FUNCTION:  (Most Recently Demonstrated)   Bed Mobility:   Unknown  Sit to Stand:   Unknown  Transfers:   Unknown  Gait/Mobility:   Unknown Bed Mobility:   Total Assistance x 2  Sit to Stand:   Not tested  Transfers:   Not tested  Gait/Mobility:   Not tested     ASSESSMENT:  Ms. Neftali Le presents to PT with grossly decreased AROM and strength in L LE. Pt has a history of dementia and per chat fractured her right hip in December 2021. Pt is now here following IMN for L hip fracture and is L LE WBAT. Pt very confused today with little to no command following. She required totalA x 2 for supine to sit with fair to poor sitting balance. She became increasingly agitated and upset sitting upright. Pt with strong posterior pushing behavior and assisted back to bed. Ms. Neftali Le could benefit from skilled PT as she is currently functioning below her baseline.         SUBJECTIVE:   Ms. Neftali Le states, \"Melisa\"    SOCIAL HISTORY/LIVING ENVIRONMENT: Per chart pt at [address] but unsure of PLOF; R hip fx in December 2021  Support Systems: Skilled Nursing Facility,Child(car[facility])  OBJECTIVE:     PAIN: VITAL SIGNS: LINES/DRAINS:   Pre Treatment: Pain Screen  Pain Scale 1: FLACC  Pain Intensity 1: 4  Post Treatment: 4   none  O2 Device: None (Room air)     GROSS EVALUATION:  B LE Within Functional Limits Abnormal/ Functional Abnormal/ Non-Functional (see comments) Not Tested Comments:   AROM [] [] [x] [] L LE   PROM [] [] [] []    Strength [] [] [x] [] L LE   Balance [] [] [x] [] Fair-poor sitting   Posture [] [] [] []    Sensation [] [] [] []    Coordination [] [] [] []    Tone [] [] [] []    Edema [] [] [] []    Activity Tolerance [] [] [] []     [] [] [] []      COGNITION/  PERCEPTION: Intact Impaired   (see comments) Comments:   Orientation [] [x] Confused with hx of dementia   Vision [] []    Hearing [] []    Command Following [] [x] Little to none   Safety Awareness [] [x] Decreased awareness of environment    [] []      MOBILITY: I Mod I S SBA CGA Min Mod Max Total  NT x2 Comments:   Bed Mobility    Rolling [] [] [] [] [] [] [] [] [x] [] [x]    Supine to Sit [] [] [] [] [] [] [] [] [x] [] [x]    Scooting [] [] [] [] [] [] [] [] [x] [] [x]    Sit to Supine [] [] [] [] [] [] [] [] [x] [] [x]    Transfers    Sit to Stand [] [] [] [] [] [] [] [] [] [] []    Bed to Chair [] [] [] [] [] [] [] [] [] [] []    Stand to Sit [] [] [] [] [] [] [] [] [] [] []    I=Independent, Mod I=Modified Independent, S=Supervision, SBA=Standby Assistance, CGA=Contact Guard Assistance,   Min=Minimal Assistance, Mod=Moderate Assistance, Max=Maximal Assistance, Total=Total Assistance, NT=Not Tested  GAIT: I Mod I S SBA CGA Min Mod Max Total  NT x2 Comments:   Level of Assistance [] [] [] [] [] [] [] [] [] [x] []    Distance NT    DME N/A    Gait Quality NT    Weightbearing Status WBAT, L LE     I=Independent, Mod I=Modified Independent, S=Supervision, SBA=Standby Assistance, CGA=Contact Guard Assistance,   Min=Minimal Assistance, Mod=Moderate Assistance, Max=Maximal Assistance, Total=Total Assistance, NT=Not Tested    [address] [place] [address] [address] [id number] [facility] Form       How much difficulty does the patient currently have. .. Unable A Lot A Little None   1. Turning over in bed (including adjusting bedclothes, sheets and blankets)? [] 1   [x] 2   [] 3   [] 4   2. Sitting down on and standing up from a chair with arms ( e.g., wheelchair, bedside commode, etc.)   [x] 1   [] 2   [] 3   [] 4   3. Moving from lying on back to sitting on the side of the bed? [x] 1   [] 2   [] 3   [] 4   How much help from another person does the patient currently need. .. Total A Lot A Little None   4. Moving to and from a bed to a chair (including a wheelchair)? [x] 1   [] 2   [] 3   [] 4   5. Need to walk in hospital room? [x] 1   [] 2   [] 3   [] 4   6. Climbing 3-5 steps with a railing? [x] 1   [] 2   [] 3   [] 4   © 2007, Trustees of [address] [place] [address] [address] [id number], under license to [employer].  All rights reserved     Score:  Initial: 7 Most Recent: X (Date: -- )    Interpretation of Tool:  Represents activities that are increasingly more difficult (i.e. Bed mobility, Transfers, Gait). PLAN:   FREQUENCY/DURATION: PT Plan of Care: BID for duration of hospital stay or until stated goals are met, whichever comes first.    PROBLEM LIST:   (Skilled intervention is medically necessary to address:)  1. Decreased ADL/Functional Activities  2. Decreased Activity Tolerance  3. Decreased AROM/PROM  4. Decreased Balance  5. Decreased Cognition  6. Decreased Coordination  7. Decreased Gait Ability  8. Decreased Strength  9. Decreased Transfer Abilities  10. Increased Pain   INTERVENTIONS PLANNED:   (Benefits and precautions of physical therapy have been discussed with the patient.)  1. Self Care Training  2. Therapeutic Activity  3. Therapeutic Exercise/HEP  4. Neuromuscular Re-education  5. Gait Training  6. Manual Therapy  7. Education     TREATMENT:     EVALUATION: Low Complexity : (Untimed Charge)    TREATMENT:   ($$ Therapeutic Activity: 8-22 mins    )  Therapeutic Activity (12 Minutes): Therapeutic activity included Rolling, Supine to Sit, Sit to Supine, Scooting and Sitting balance  to improve functional Mobility, Strength, ROM and Activity tolerance.     TREATMENT GRID:  N/A    AFTER TREATMENT POSITION/PRECAUTIONS:  Alarm Activated, Bed and Needs within reach    INTERDISCIPLINARY COLLABORATION:  RN/PCT and PT/PTA    TOTAL TREATMENT DURATION:  PT Patient Time In/Time Out  Time In: 0836  Time Out: Juan Huber, PT, DPT

## 2022-02-26 NOTE — PROGRESS NOTES
Patient is up for discharge. Patient discharging back to [facility], room 235B, report [phone]. CM arranged transport for next available, 3:00 via [facility]. Patient's daughter, facility, and RN notified of d/c time. Packet completed and given to . No other needs have been identified this time. Care Management Interventions  PCP Verified by CM:  Yes  Mode of Transport at Discharge: BLS  Transition of Care Consult (CM Consult): Discharge Planning,SNF  Partner SNF: Yes  Discharge Durable Medical Equipment: No  Physical Therapy Consult: Yes  Occupational Therapy Consult: Yes  Speech Therapy Consult: No  Support Systems: Skilled Nursing Facility,Child(carlos eduardo)  Confirm Follow Up Transport: Family  The Plan for Transition of Care is Related to the Following Treatment Goals : STR to improve pt's strength and functional abilities for a safe transition to the next level of care  The Patient and/or Patient Representative was Provided with a Choice of Provider and Agrees with the Discharge Plan?: Yes  Freedom of Choice List was Provided with Basic Dialogue that Supports the Patient's Individualized Plan of Care/Goals, Treatment Preferences and Shares the Quality Data Associated with the Providers?: Yes  Discharge Location  Patient Expects to be Discharged to[de-identified] Rehab Unit Subacute ([facility])

## 2022-02-26 NOTE — PROGRESS NOTES
TRANSFER - OUT REPORT:    Verbal report given to [address] [place] [address] on Vanessa Sims  being transferred to [facility] room 235B for routine progression of care       Report consisted of patients Situation, Background, Assessment and   Recommendations(SBAR). Information from the following report(s) SBAR was reviewed with the receiving nurse. Opportunity for questions and clarification was provided.

## 2022-02-26 NOTE — PROGRESS NOTES
Patient resting in bed with no complaints at this time. Patient is alert and orientated with no distress noted. IV intact and patent with no s/s of infection noted. Respirations even and unlabored with heart rate regular. Patient able to ambulate independently without assistance. Dressing to incision c/d/i. Bed in low locked position with call light within reach. Patient instructed to call if assistance is needed. Will continue to monitor.

## 2022-02-26 NOTE — DISCHARGE SUMMARY
Hospitalist Discharge Summary   Admit Date:  2022  2:01 PM   DC Note date: 2022  Name:  Sherry Santillan   Age:  80 y.o. Sex:  female  :  1940   MRN:  [id number]   Room:  [facility]  PCP:  Olu De Jesus MD    Presenting Complaint: Hip Injury    Initial Admission Diagnosis: Closed comminuted intertrochanteric fracture of proximal femur, left, initial encounter [facility]) [S72.142A]     Problem List for this Hospitalization:  Hospital Problems as of 2022 Date Reviewed: 2/15/2022          Codes Class Noted - Resolved POA    * (Principal) Closed comminuted intertrochanteric fracture of proximal femur, left, initial encounter [facility]) ICD-10-CM: G52.692V  ICD-9-CM: 820.21  2022 - Present Unknown        HTN (hypertension) ICD-10-CM: I10  ICD-9-CM: 401.9  2022 - Present Unknown        Psychiatric disorder ICD-10-CM: F99  ICD-9-CM: 298.9  Unknown - Present Yes            Did Patient have Sepsis (YES OR NO): No     Hospital Course:  Ms. Abelino Rush is a 80 y.o. female with a history of hypertension, dementia, and R hip fracture in Dec/2021 who presented to the ER from [facility] after it was discovered she had an intertrochanteric fracture after a fall. The patient's daughter states her mother is at baseline and is unable to carry on a conversation.  The patient's code status is DNR.     XR hip showed a comminuted and angulated intertrochanteric fracture of the left hip. She was admitted to the Hospitalist service. Ortho surgery was consulted and the patient was taken to the OR on . UA negative     S/p Left ORIF patient remained hemodynamically stable with unremarkable labs. She will f/u with Ortho after discharge. Patient denied excessive pain, N/V, chest pain. Behavioral disorder  Dementia  - Continue Depakote BID.   Exelon patch.      HTN  - Lisinopril reduced to 10mg due to soft BP at discharge     Normocytic anemia  -Hgb 8.4 continue Fe at discharge     Disposition: Skilled Nursing Facility  Diet: ADULT DIET Regular  Code Status: DNR    Follow Up Orders: Follow-up Appointments   Procedures    FOLLOW UP VISIT Appointment in: Two Weeks With Ortho in 2 weeks     With Ortho in 2 weeks     Standing Status:   Standing     Number of Occurrences:   1     Order Specific Question:   Appointment in     Answer: Two Weeks       Follow-up Information     Follow up With Specialties Details Why Contact Info    Colleen2 LUAN Munoz [address]   [facility] [address]  [phone]    Anna Torres MD Internal Medicine   [address] [place] [address]  [phone]            Follow up labs/diagnostics (ultimately defer to outpatient provider):  Ortho    Time spent in patient discharge and coordination 40 minutes. Patient was stable at time of discharge. Instructions given to call a physician or return if any concerns. Discharge Info:   Current Discharge Medication List      START taking these medications    Details   acetaminophen (TYLENOL) 325 mg tablet Take 2 Tablets by mouth every six (6) hours as needed for Pain for up to 30 days. Qty: 120 Tablet, Refills: 0  Start date: 2/26/2022, End date: 3/28/2022      aspirin delayed-release 325 mg tablet Take 1 Tablet by mouth daily for 33 doses. Qty: 30 Tablet, Refills: 0  Start date: 2/27/2022, End date: 4/1/2022      calcium-vitamin D (OS-FELICE +D3) 500 mg-200 unit per tablet Take 1 Tablet by mouth three (3) times daily (with meals) for 30 days. Qty: 90 Tablet, Refills: 0  Start date: 2/26/2022, End date: 3/28/2022      ferrous sulfate 325 mg (65 mg iron) tablet Take 1 Tablet by mouth daily (with breakfast) for 30 days. Qty: 30 Tablet, Refills: 0  Start date: 2/27/2022, End date: 3/29/2022      oxyCODONE IR (ROXICODONE) 5 mg immediate release tablet Take 1 Tablet by mouth every six (6) hours as needed for Pain for up to 3 days.  Max Daily Amount: 20 mg.  Yrn Garcia: 12 Tablet, Refills: 0  Start date: 2/26/2022, End date: 3/1/2022    Associated Diagnoses: Closed comminuted intertrochanteric fracture of left femur, initial encounter ([facility] [address]      polyethylene glycol (MIRALAX) 17 gram packet Take 1 Packet by mouth daily as needed for Constipation for up to 30 days. Qty: 1 Each, Refills: 0  Start date: 2/26/2022, End date: 3/28/2022      rivastigmine (EXELON) 9.5 mg/24 hour patch 1 Patch by TransDERmal route daily for 30 days. Qty: 30 Patch, Refills: 0  Start date: 2/27/2022, End date: 3/29/2022      pantoprazole (PROTONIX) 40 mg tablet Take 1 Tablet by mouth daily for 30 days. Qty: 30 Tablet, Refills: 0  Start date: 2/27/2022, End date: 3/29/2022         CONTINUE these medications which have CHANGED    Details   lisinopriL (PRINIVIL, ZESTRIL) 10 mg tablet Take 1 Tablet by mouth daily for 30 days. Qty: 30 Tablet, Refills: 0  Start date: 2/26/2022, End date: 3/28/2022         CONTINUE these medications which have NOT CHANGED    Details   divalproex (DEPAKOTE SPRINKLE) 125 mg capsule Take 125 mg by mouth two (2) times a day. risperiDONE microspheres (RISPERDAL CONSTA) 50 mg/2 mL injection 50 mg by IntraMUSCular route Twice a month. STOP taking these medications       QUEtiapine (SEROQUEL) 50 mg tablet Comments:   Reason for Stopping:               Procedures done this admission:  Procedure(s): FEMORAL INTERTROCHANTERIC NAIL INSERTION    Consults this admission:  IP CONSULT TO ORTHOPEDIC SURGERY    Echocardiogram/EKG results:  No results found for this or any previous visit.        EKG Results     Procedure 720 Value Units Date/Time    EKG, 12 LEAD, INITIAL [[id number]]     Order Status: Canceled     EKG 12 LEAD INITIAL [[id number]] Collected: 02/24/22 1407    Order Status: Completed Updated: 02/24/22 1427     Ventricular Rate 101 BPM      Atrial Rate 103 BPM      P-R Interval 217 ms      QRS Duration 98 ms      Q-T Interval 359 ms      QTC Calculation (Bezet) 466 ms Calculated P Axis 41 degrees      Calculated R Axis -69 degrees      Calculated T Axis 55 degrees      Diagnosis --     Sinus tachycardia  Prolonged HI interval  LAD, consider left anterior fascicular block  Abnormal R-wave progression, early transition  Borderline repolarization abnormality    Confirmed by Sakina Duarte MD, [facility] ([id number]) on 2/24/2022 2:27:41 PM            Diagnostic Imaging/Tests:   XR HIP LT W OR WO PELV 2-3 VWS    Result Date: 2/25/2022  Left Hip INDICATION: Postoperative x-ray following reduction of left hip fracture. COMPARISON: Pelvis and left hip x-ray 2/24/2022. Postoperative changes from left intramedullary femoral jennifer placement and left dynamic hip screw reduction of a comminuted intertrochanteric left hip fracture. Intramedullary jennifer and dynamic right hip screw also noted and appear stable. Hips are located bilaterally. XR HIP LT W OR WO PELV 2-3 VWS    Result Date: 2/24/2022  3 VIEW PELVIS AND LEFT HIP, TWO-VIEW LEFT FEMUR: CLINICAL HISTORY:  Left hip pain after fall this morning. COMPARISON:  Right femur of February 15, 2022 and earlier studies. FINDINGS:  There is a comminuted intertrochanteric fracture of the proximal left femur with little displacement but significant varus angulation of the principal distal fragment. Left femur appears intact distally as imaged, with degenerative changes at the knee including chondrocalcinosis. Healing right femoral intertrochanteric fracture status post ORIF is again noted. The pelvic ring appears intact in the AP projection, although underlying osteopenia and overlying stool limits evaluation for possible nondisplaced fractures. Comminuted and angulated intertrochanteric fracture of the left hip. XR FEMUR LT 2 V    Result Date: 2/25/2022  Left femur INDICATION: Intraoperative C-arm views during ORIF left hip fracture. 38 seconds fluoroscopy time. 6 images obtained.      Intraoperative C-arm views during placement of an intramedullary jennifer and dynamic left hip screw to reduce an intertrochanteric left hip fracture. XR FEMUR LT 2 V    Result Date: 2/24/2022  3 VIEW PELVIS AND LEFT HIP, TWO-VIEW LEFT FEMUR: CLINICAL HISTORY:  Left hip pain after fall this morning. COMPARISON:  Right femur of February 15, 2022 and earlier studies. FINDINGS:  There is a comminuted intertrochanteric fracture of the proximal left femur with little displacement but significant varus angulation of the principal distal fragment. Left femur appears intact distally as imaged, with degenerative changes at the knee including chondrocalcinosis. Healing right femoral intertrochanteric fracture status post ORIF is again noted. The pelvic ring appears intact in the AP projection, although underlying osteopenia and overlying stool limits evaluation for possible nondisplaced fractures. Comminuted and angulated intertrochanteric fracture of the left hip. XR CHEST PORT    Result Date: 2/24/2022  PORTABLE CHEST, February 24, 2022 at 1435 hours CLINICAL HISTORY:  Preoperative evaluation for hip fracture from fall. COMPARISON:  December 15, 2021. FINDINGS:  AP supine image demonstrates no confluent infiltrate or significant pleural fluid. The heart size is within normal limits with atherosclerotic calcification and tortuosity of the aorta but no evidence of congestive heart failure or pneumothorax. The bony thorax appears intact on this view. There are overlying radiopaque support devices. NO ACUTE CARDIOPULMONARY DISEASE OR BONY ABNORMALITY IDENTIFIED. NC XR TECHNOLOGIST SERVICE    Result Date: 2/25/2022  Administrative Report Fluoroscopy was provided in the operating room. Images may have been saved as a reference for the surgical procedure. The operative report can be viewed in [address] [place] [address] and/or retrieved by the Medical Records department.      A Radiologist has not reviewed images associated with this exam.      All Micro Results     Procedure Component Value Units Date/Time    COVID-19 RAPID TEST [[id number]] Collected: 02/26/22 1037    Order Status: Completed Specimen: Nasopharyngeal Updated: 02/26/22 1128     Specimen source NASAL        COVID-19 rapid test Not detected        Comment:      The specimen is NEGATIVE for SARS-CoV-2, the novel coronavirus associated with COVID-19. A negative result does not rule out COVID-19. This test has been authorized by the FDA under an Emergency Use Authorization (EUA) for use by authorized laboratories. Fact sheet for Healthcare Providers: Rupeetalkdate.co.nz  Fact sheet for Patients: Rupeetalkdate.co.nz       Methodology: Isothermal Nucleic Acid Amplification         COVID-19 RAPID TEST [[id number]] Collected: 02/24/22 1727    Order Status: Completed Specimen: Nasopharyngeal Updated: 02/24/22 1802     Specimen source NASAL        Comment: RAPID        COVID-19 rapid test Not detected        Comment:      The specimen is NEGATIVE for SARS-CoV-2, the novel coronavirus associated with COVID-19. A negative result does not rule out COVID-19. This test has been authorized by the FDA under an Emergency Use Authorization (EUA) for use by authorized laboratories.         Fact sheet for Healthcare Providers: ShotClip.co.nz  Fact sheet for Patients: ShotClip.co.nz       Methodology: Isothermal Nucleic Acid Amplification               Labs: Results:       BMP, Mg, Phos Recent Labs     02/25/22  0519 02/24/22  1415    140   K 3.7 3.7    110*   CO2 27 28   AGAP 7 2*   BUN 28* 33*   CREA 0.40* 0.60   CA 9.4 9.6   * 158*      CBC Recent Labs     02/26/22  0443 02/24/22  1415   WBC 7.1 8.6   RBC 2.77* 3.67*   HGB 8.4* 10.7*   HCT 26.3* 34.4*    187   GRANS 80* 88*   LYMPH 11* 5*   EOS 1 0*   MONOS 8 6   BASOS 0 0   IG 0 1   ANEU 5.7 7.6   ABL 0.8 0.5   YAAKOV 0.0 0.0   ABM 0.6 0.5   ABB 0.0 0.0 AIG 0.0 0.1      LFT Recent Labs     02/24/22  1415   ALT 21   *   TP 6.9   ALB 3.5   GLOB 3.4   AGRAT 1.0*      Cardiac Testing No results found for: BNPP, BNP, CPK, RCK1, RCK2, RCK3, RCK4, CKMB, CKNDX, CKND1, TROPT, TROIQ   Coagulation Tests Lab Results   Component Value Date/Time    Prothrombin time 14.3 02/24/2022 02:15 PM    Prothrombin time 15.0 (H) 12/16/2021 05:20 PM    INR 1.1 02/24/2022 02:15 PM    INR 1.2 12/16/2021 05:20 PM    aPTT 33.6 12/16/2021 05:20 PM      A1c No results found for: HBA1C, [id number]   Lipid Panel No results found for: CHOL, CHOLPOCT, CHOLX, CHLST, CHOLV, 329121, HDL, HDLP, LDL, LDLC, DLDLP, 142661, VLDLC, VLDL, TGLX, TRIGL, TRIGP, TGLPOCT, CHHD, CHHDX   Thyroid Panel No results found for: TSH, T4, FT4, TT3, T3U, TSHEXT     Most Recent UA Lab Results   Component Value Date/Time    Color YELLOW 02/24/2022 03:50 PM    Appearance CLEAR 02/24/2022 03:50 PM    pH (UA) 6.0 02/24/2022 03:50 PM    Protein Negative 02/24/2022 03:50 PM    Glucose Negative 02/24/2022 03:50 PM    Ketone Negative 02/24/2022 03:50 PM    Bilirubin Negative 02/24/2022 03:50 PM    Blood Negative 02/24/2022 03:50 PM    Urobilinogen 1.0 02/24/2022 03:50 PM    Nitrites Negative 02/24/2022 03:50 PM    Leukocyte Esterase Negative 02/24/2022 03:50 PM    WBC 20-50 12/16/2021 05:34 AM    RBC 5-10 12/16/2021 05:34 AM    Bacteria 1+ (H) 12/16/2021 05:34 AM    Casts HYALINE 12/16/2021 05:34 AM    Mucus 1+ (H) 12/16/2021 05:34 AM    Other observations RESULTS VERIFIED MANUALLY 12/16/2021 05:34 AM          All Labs from Last 24 Hrs:  Recent Results (from the past 24 hour(s))   PLEASE READ & DOCUMENT PPD TEST IN 24 HRS    Collection Time: 02/25/22  6:30 PM   Result Value Ref Range    PPD Negative Negative    mm Induration 0 0 - 5 mm   CBC WITH AUTOMATED DIFF    Collection Time: 02/26/22  4:43 AM   Result Value Ref Range    WBC 7.1 4.3 - 11.1 K/uL    RBC 2.77 (L) 4.05 - 5.2 M/uL    HGB 8.4 (L) 11.7 - 15.4 g/dL    HCT 26.3 (L) 35.8 - 46.3 %    MCV 94.9 79.6 - 97.8 FL    MCH 30.3 26.1 - 32.9 PG    MCHC 31.9 31.4 - 35.0 g/dL    RDW 14.2 11.9 - 14.6 %    PLATELET 843 086 - 675 K/uL    MPV 10.5 9.4 - 12.3 FL    ABSOLUTE NRBC 0.00 0.0 - 0.2 K/uL    DF AUTOMATED      NEUTROPHILS 80 (H) 43 - 78 %    LYMPHOCYTES 11 (L) 13 - 44 %    MONOCYTES 8 4.0 - 12.0 %    EOSINOPHILS 1 0.5 - 7.8 %    BASOPHILS 0 0.0 - 2.0 %    IMMATURE GRANULOCYTES 0 0.0 - 5.0 %    ABS. NEUTROPHILS 5.7 1.7 - 8.2 K/UL    ABS. LYMPHOCYTES 0.8 0.5 - 4.6 K/UL    ABS. MONOCYTES 0.6 0.1 - 1.3 K/UL    ABS. EOSINOPHILS 0.0 0.0 - 0.8 K/UL    ABS. BASOPHILS 0.0 0.0 - 0.2 K/UL    ABS. IMM.  GRANS. 0.0 0.0 - 0.5 K/UL   COVID-19 RAPID TEST    Collection Time: 02/26/22 10:37 AM   Result Value Ref Range    Specimen source NASAL      COVID-19 rapid test Not detected NOTD         Current Med List in Hospital:   Current Facility-Administered Medications   Medication Dose Route Frequency    morphine injection 2 mg  2 mg IntraVENous Q1H PRN    alum-mag hydroxide-simeth (MYLANTA) oral suspension 30 mL  30 mL Oral Q4H PRN    calcium-vitamin D (OS-FELICE +D3) 500 mg-200 unit per tablet 1 Tablet  1 Tablet Oral TID WITH MEALS    aspirin delayed-release tablet 325 mg  325 mg Oral DAILY    lactated Ringers infusion  50 mL/hr IntraVENous CONTINUOUS    divalproex (DEPAKOTE SPRINKLE) capsule 125 mg  125 mg Oral BID    lisinopriL (PRINIVIL, ZESTRIL) tablet 20 mg  20 mg Oral DAILY    QUEtiapine (SEROquel) tablet 50 mg  50 mg Oral DAILY    rivastigmine (EXELON) 9.5 mg/24 hour patch 1 Patch  1 Patch TransDERmal DAILY    ferrous sulfate tablet 325 mg  1 Tablet Oral DAILY WITH BREAKFAST    LORazepam (ATIVAN) injection 1 mg  1 mg IntraVENous Q12H PRN    pantoprazole (PROTONIX) tablet 40 mg  40 mg Oral DAILY    sodium chloride (NS) flush 5-40 mL  5-40 mL IntraVENous Q8H    sodium chloride (NS) flush 5-40 mL  5-40 mL IntraVENous PRN    acetaminophen (TYLENOL) tablet 650 mg  650 mg Oral Q6H PRN    Or    acetaminophen (TYLENOL) suppository 650 mg  650 mg Rectal Q6H PRN    polyethylene glycol (MIRALAX) packet 17 g  17 g Oral DAILY PRN    ondansetron (ZOFRAN ODT) tablet 4 mg  4 mg Oral Q8H PRN    Or    ondansetron (ZOFRAN) injection 4 mg  4 mg IntraVENous Q6H PRN    oxyCODONE IR (ROXICODONE) tablet 5 mg  5 mg Oral Q4H PRN       No Known Allergies  Immunization History   Administered Date(s) Administered    TB Skin Test (PPD) Intradermal 12/16/2021, 02/24/2022       Recent Vital Data:  Patient Vitals for the past 24 hrs:   Temp Pulse Resp BP SpO2   02/26/22 1118 97.6 °F (36.4 °C) 92 17 90/60 96 %   02/26/22 0712 98 °F (36.7 °C) 89 16 105/68 98 %   02/26/22 0440 97.6 °F (36.4 °C) 94 17 98/61 95 %   02/25/22 2346 98.3 °F (36.8 °C) 99 17 107/67 95 %   02/25/22 2020 97.6 °F (36.4 °C) 96 17 104/70 97 %   02/25/22 1411 97.9 °F (36.6 °C) 98 16 112/75 94 %     Oxygen Therapy  O2 Sat (%): 96 % (02/26/22 1118)  Pulse via Oximetry: 91 beats per minute (02/25/22 1105)  O2 Device: None (Room air) (02/26/22 0440)  O2 Flow Rate (L/min): 3 l/min (02/25/22 1100)    Estimated body mass index is 25.24 kg/m² as calculated from the following:    Height as of this encounter: 5' 8\" (1.727 m). Weight as of this encounter: 75.3 kg (166 lb). Intake/Output Summary (Last 24 hours) at 2/26/2022 1205  Last data filed at 2/25/2022 1737  Gross per 24 hour   Intake 900 ml   Output --   Net 900 ml         Physical Exam:    General:    No overt distress  Head:  Normocephalic, atraumatic  Eyes:  Sclerae appear normal.  Pupils equally round. HENT:  Nares appear normal, no drainage. Moist mucous membranes  Neck:  No restricted ROM. Trachea midline  CV:   RRR. No m/r/g. No JVD  Lungs:   CTAB. No wheezing, rhonchi, or rales. Even, unlabored  Abdomen:   Soft, nontender, nondistended. Extremities: Warm and dry. No cyanosis or clubbing. No edema. Skin:     No rashes.   Normal coloration  Neuro:  CN II-XII grossly intact. Psych:  Normal mood and affect. Signed:  Seth Castro NP    Part of this note may have been written by using a voice dictation software. The note has been proof read but may still contain some grammatical/other typographical errors.

## 2022-02-26 NOTE — PROGRESS NOTES
ORTH POST OP PROGRESS NOTE    2022  Admit Date: 2022  Admit Diagnosis: Closed comminuted intertrochanteric fracture of proximal femur, left, initial encounter [facility]) [S72.142A]  Procedure: Procedure(s): FEMORAL INTERTROCHANTERIC NAIL INSERTION  Post Op day: 1 Day Post-Op      Subjective: Ignacio Lyons is a patient who has no complaints. Objective:     Vital Signs:    Blood pressure 105/68, pulse 89, temperature 98 °F (36.7 °C), resp. rate 16, height 5' 8\" (1.727 m), weight 166 lb (75.3 kg), SpO2 98 %. Temp (24hrs), Av °F (36.7 °C), Min:97.6 °F (36.4 °C), Max:98.3 °F (36.8 °C)      No intake/output data recorded.  1901 -  0700  In: 1400 [P.O.:600; I.V.:800]  Out: 200 [Urine:150]    LAB:    Recent Labs     22  0443   HGB 8.4*   WBC 7.1          Physical Exam    General:   Alert and oriented. No acute distress  Lungs:  Respirations unlabored. Extremities: No evidence of cyanosis. Calves soft, nontender. Moves both upper and lower extremities.    Dressing:  clean, dry, and intact  Neuro:  no deficit      Assessment:      Patient Active Problem List   Diagnosis Code    Closed right hip fracture ([facility] [address] S72.001A    Psychiatric disorder F99    Acute postoperative anemia due to expected blood loss D62    Early onset Alzheimer's dementia ([facility] [address] G30.0, F02.80    Closed comminuted intertrochanteric fracture of proximal femur, left, initial encounter ([facility] [address] S72.142A    HTN (hypertension) I10       Plan:     Continue PT/OT    Anticipate Discharge To: [facility] - awaiting covid result       Signed By: Amador Mcgrath PA-C

## 2022-02-26 NOTE — PROGRESS NOTES
Problem: Falls - Risk of  Goal: *Absence of Falls  Description: Document Tracy Brando Fall Risk and appropriate interventions in the flowsheet.   Outcome: Progressing Towards Goal  Note: Fall Risk Interventions:       Mentation Interventions: Bed/chair exit alarm,Reorient patient    Medication Interventions: Evaluate medications/consider consulting pharmacy    Elimination Interventions: Bed/chair exit alarm,Call light in reach    History of Falls Interventions: Bed/chair exit alarm

## 2022-03-19 PROBLEM — S72.142A CLOSED COMMINUTED INTERTROCHANTERIC FRACTURE OF PROXIMAL FEMUR, LEFT, INITIAL ENCOUNTER (HCC): Status: ACTIVE | Noted: 2022-02-24

## 2022-03-19 PROBLEM — S72.001A CLOSED RIGHT HIP FRACTURE (HCC): Status: ACTIVE | Noted: 2021-12-15

## 2022-03-19 PROBLEM — I10 HTN (HYPERTENSION): Status: ACTIVE | Noted: 2022-02-24

## 2022-03-19 PROBLEM — D62 ACUTE POSTOPERATIVE ANEMIA DUE TO EXPECTED BLOOD LOSS: Status: ACTIVE | Noted: 2021-12-19

## 2022-03-20 PROBLEM — F02.80 EARLY ONSET ALZHEIMER'S DEMENTIA (HCC): Status: ACTIVE | Noted: 2021-12-19

## 2022-03-20 PROBLEM — G30.0 EARLY ONSET ALZHEIMER'S DEMENTIA (HCC): Status: ACTIVE | Noted: 2021-12-19

## 2023-10-11 ENCOUNTER — HOSPITAL ENCOUNTER (EMERGENCY)
Age: 83
Discharge: HOME | End: 2023-10-11
Attending: EMERGENCY MEDICINE
Payer: MEDICARE

## 2023-10-11 VITALS
SYSTOLIC BLOOD PRESSURE: 107 MMHG | BODY MASS INDEX: 22.49 KG/M2 | HEART RATE: 60 BPM | WEIGHT: 135 LBS | OXYGEN SATURATION: 96 % | DIASTOLIC BLOOD PRESSURE: 62 MMHG | RESPIRATION RATE: 18 BRPM | TEMPERATURE: 98.6 F | HEIGHT: 65 IN

## 2023-10-11 DIAGNOSIS — S61.412A LACERATION OF LEFT HAND WITHOUT FOREIGN BODY, INITIAL ENCOUNTER: Primary | ICD-10-CM

## 2023-10-11 PROCEDURE — 12032 INTMD RPR S/A/T/EXT 2.6-7.5: CPT

## 2023-10-11 PROCEDURE — 2500000003 HC RX 250 WO HCPCS: Performed by: EMERGENCY MEDICINE

## 2023-10-11 PROCEDURE — 99284 EMERGENCY DEPT VISIT MOD MDM: CPT

## 2023-10-11 RX ORDER — LIDOCAINE HYDROCHLORIDE AND EPINEPHRINE 10; 10 MG/ML; UG/ML
20 INJECTION, SOLUTION INFILTRATION; PERINEURAL
Status: COMPLETED | OUTPATIENT
Start: 2023-10-11 | End: 2023-10-11

## 2023-10-11 RX ADMIN — LIDOCAINE HYDROCHLORIDE,EPINEPHRINE BITARTRATE 20 ML: 10; .01 INJECTION, SOLUTION INFILTRATION; PERINEURAL at 13:57

## 2023-10-11 ASSESSMENT — LIFESTYLE VARIABLES
HOW OFTEN DO YOU HAVE A DRINK CONTAINING ALCOHOL: NEVER
HOW MANY STANDARD DRINKS CONTAINING ALCOHOL DO YOU HAVE ON A TYPICAL DAY: PATIENT DOES NOT DRINK

## 2023-10-11 ASSESSMENT — PAIN - FUNCTIONAL ASSESSMENT: PAIN_FUNCTIONAL_ASSESSMENT: ADULT NONVERBAL PAIN SCALE (NPVS)

## 2023-10-11 NOTE — ED NOTES
I have reviewed discharge instructions with the EMS  The EMS verbalized understanding. Patient left ED via Discharge Method: stretcher to Nursing Home with [facility]). Opportunity for questions and clarification provided. Patient given 0 scripts. To continue your aftercare when you leave the hospital, you may receive an automated call from our care team to check in on how you are doing. This is a free service and part of our promise to provide the best care and service to meet your aftercare needs. \" If you have questions, or wish to unsubscribe from this service please call [phone]. Thank you for Choosing our [address] [place] [address] Emergency Department.         Little Powell, RN  10/11/23 9157

## 2023-10-11 NOTE — ED NOTES
[facility] called to transport patient back to facility; ETA 30 minutes.      [address] [facility], [facility]  10/11/23 [id number]

## 2023-10-11 NOTE — ED TRIAGE NOTES
Patient a 79 y/o Female reports to the ED via [facility]. States she was being bathed. While moving to a shower chair she hit her left hand and hit the shower chair and cut her hand between her index and middle finger. Non verbal at baseline Aox1 to self. Hx of Dementia Patient from [facility].

## 2023-10-11 NOTE — DISCHARGE INSTRUCTIONS
Keep dry for 24 to 48 hours then clean the area gently with antibacterial hand soap and water, dry, apply over-the-counter bacitracin ointment and cover with a nonadherent Band-Aid or bandage. Return if signs of infection with increased pain, redness, warmth or pus from the wound. Sutures out in 14 days with her doctor or this emergency department.

## (undated) DEVICE — 3M™ IOBAN™ 2 ANTIMICROBIAL INCISE DRAPE 6650EZ: Brand: IOBAN™ 2

## (undated) DEVICE — DRILL, AO, STERILE

## (undated) DEVICE — REAMER SHAFT, MOD.TRINKLE: Brand: BIXCUT

## (undated) DEVICE — SPONGE GZ W4XL4IN COT 12 PLY TYP VII WVN C FLD DSGN

## (undated) DEVICE — DRAPE,U/SHT,SPLIT,FILM,60X84,STERILE: Brand: MEDLINE

## (undated) DEVICE — INTENDED FOR TISSUE SEPARATION, AND OTHER PROCEDURES THAT REQUIRE A SHARP SURGICAL BLADE TO PUNCTURE OR CUT.: Brand: BARD-PARKER SAFETY BLADES SIZE 10, STERILE

## (undated) DEVICE — SUTURE MCRYL SZ 2-0 L27IN ABSRB UD SH L26MM TAPERPOINT NDL Y417H

## (undated) DEVICE — DRAPE SHT 3 QTR PROXIMA 53X77 --

## (undated) DEVICE — SOLUTION IRRIG 1000ML 09% SOD CHL USP PIC PLAS CONTAINER

## (undated) DEVICE — PAD,ABDOMINAL,5"X9",ST,LF,25/BX: Brand: MEDLINE INDUSTRIES, INC.

## (undated) DEVICE — GUIDE WIRE, BALL-TIPPED, STERILE

## (undated) DEVICE — DRAPE C-ARMOUR C-ARM KIT --

## (undated) DEVICE — 7 DAY SILVER-COATED ANTIMICROBIAL BARRIER DRESSING: Brand: ACTICOAT 7  4" X 5"

## (undated) DEVICE — GLOVE SURG SZ 8 L12IN FNGR THK79MIL GRN LTX FREE

## (undated) DEVICE — DRAPE PT ISOLATN 130 IN X 96 IN

## (undated) DEVICE — K-WIRE

## (undated) DEVICE — GLOVE ORTHO 8   MSG9480

## (undated) DEVICE — TFN: Brand: MEDLINE INDUSTRIES, INC.

## (undated) DEVICE — GLOVE ORANGE PI 7 1/2   MSG9075

## (undated) DEVICE — SHEET, DRAPE, SPLIT, STERILE: Brand: MEDLINE

## (undated) DEVICE — PREP SKN CHLRAPRP APL 26ML STR --